# Patient Record
Sex: FEMALE | Race: WHITE | NOT HISPANIC OR LATINO | Employment: FULL TIME | URBAN - METROPOLITAN AREA
[De-identification: names, ages, dates, MRNs, and addresses within clinical notes are randomized per-mention and may not be internally consistent; named-entity substitution may affect disease eponyms.]

---

## 2018-07-12 DIAGNOSIS — Z12.39 SCREENING BREAST EXAMINATION: Primary | ICD-10-CM

## 2018-08-14 ENCOUNTER — HOSPITAL ENCOUNTER (OUTPATIENT)
Dept: CARDIOLOGY | Facility: HOSPITAL | Age: 43
Discharge: HOME | End: 2018-08-14
Attending: OBSTETRICS & GYNECOLOGY
Payer: COMMERCIAL

## 2018-08-14 ENCOUNTER — HOSPITAL ENCOUNTER (OUTPATIENT)
Dept: RADIOLOGY | Facility: HOSPITAL | Age: 43
Discharge: HOME | End: 2018-08-14
Attending: OBSTETRICS & GYNECOLOGY
Payer: COMMERCIAL

## 2018-08-14 DIAGNOSIS — N92.0 MENORRHAGIA: ICD-10-CM

## 2018-08-14 DIAGNOSIS — N92.0 MENORRHAGIA: Primary | ICD-10-CM

## 2018-08-14 DIAGNOSIS — N94.6 MENORRHALGIA: ICD-10-CM

## 2018-08-14 DIAGNOSIS — N84.0 ENDOMETRIAL POLYP: ICD-10-CM

## 2018-08-14 PROCEDURE — 71046 X-RAY EXAM CHEST 2 VIEWS: CPT | Mod: 26,,, | Performed by: RADIOLOGY

## 2018-08-14 PROCEDURE — 71046 X-RAY EXAM CHEST 2 VIEWS: CPT | Mod: TC,FY

## 2018-08-14 PROCEDURE — 93005 ELECTROCARDIOGRAM TRACING: CPT

## 2018-08-14 RX ORDER — ACETAMINOPHEN 10 MG/ML
1000 INJECTION, SOLUTION INTRAVENOUS
Status: CANCELLED | OUTPATIENT
Start: 2018-08-14 | End: 2018-08-14

## 2018-08-14 RX ORDER — DOXYCYCLINE HYCLATE 100 MG
100 TABLET ORAL
Status: CANCELLED | OUTPATIENT
Start: 2018-08-14

## 2018-08-14 RX ORDER — DOXYCYCLINE HYCLATE 100 MG
100 TABLET ORAL 2 TIMES DAILY
Status: CANCELLED | OUTPATIENT
Start: 2018-08-14 | End: 2018-08-15

## 2018-08-14 RX ORDER — SODIUM CHLORIDE, SODIUM LACTATE, POTASSIUM CHLORIDE, CALCIUM CHLORIDE 600; 310; 30; 20 MG/100ML; MG/100ML; MG/100ML; MG/100ML
INJECTION, SOLUTION INTRAVENOUS CONTINUOUS
Status: CANCELLED | OUTPATIENT
Start: 2018-08-14

## 2018-08-15 ENCOUNTER — HOSPITAL ENCOUNTER (OUTPATIENT)
Facility: HOSPITAL | Age: 43
Discharge: HOME | End: 2018-08-15
Attending: OBSTETRICS & GYNECOLOGY | Admitting: OBSTETRICS & GYNECOLOGY
Payer: COMMERCIAL

## 2018-08-15 ENCOUNTER — ANESTHESIA EVENT (OUTPATIENT)
Dept: SURGERY | Facility: HOSPITAL | Age: 43
End: 2018-08-15
Payer: COMMERCIAL

## 2018-08-15 ENCOUNTER — ANESTHESIA (OUTPATIENT)
Dept: SURGERY | Facility: HOSPITAL | Age: 43
End: 2018-08-15
Payer: COMMERCIAL

## 2018-08-15 DIAGNOSIS — N94.6 MENORRHALGIA: ICD-10-CM

## 2018-08-15 DIAGNOSIS — Z01.818 PREOPERATIVE EVALUATION OF A MEDICAL CONDITION TO RULE OUT SURGICAL CONTRAINDICATIONS (TAR REQUIRED): ICD-10-CM

## 2018-08-15 DIAGNOSIS — N84.0 ENDOMETRIAL POLYP: ICD-10-CM

## 2018-08-15 DIAGNOSIS — D50.0 IRON DEFICIENCY ANEMIA DUE TO CHRONIC BLOOD LOSS: Primary | ICD-10-CM

## 2018-08-15 LAB
B-HCG UR QL: NEGATIVE
BILIRUB UR QL STRIP: NEGATIVE
CLARITY UR: CLEAR
COLOR UR: YELLOW
GLUCOSE UR QL STRIP: NEGATIVE
HGB UR QL STRIP: NEGATIVE
KETONES UR QL STRIP: NEGATIVE
LEUKOCYTE ESTERASE UR QL STRIP: NEGATIVE
NITRITE UR QL STRIP: NEGATIVE
PH UR STRIP: >8 [PH] (ref 5–8)
PROT UR QL STRIP: NEGATIVE
SP GR UR STRIP: 1.02 (ref 1–1.03)
URN SPEC COLLECT METH UR: ABNORMAL
UROBILINOGEN UR STRIP-ACNC: NEGATIVE EU/DL

## 2018-08-15 PROCEDURE — S0020 INJECTION, BUPIVICAINE HYDRO: HCPCS | Performed by: OBSTETRICS & GYNECOLOGY

## 2018-08-15 PROCEDURE — 88305 TISSUE EXAM BY PATHOLOGIST: CPT | Performed by: PATHOLOGY

## 2018-08-15 PROCEDURE — 36000706: Performed by: OBSTETRICS & GYNECOLOGY

## 2018-08-15 PROCEDURE — 25000003 PHARM REV CODE 250: Performed by: OBSTETRICS & GYNECOLOGY

## 2018-08-15 PROCEDURE — 36000707: Performed by: OBSTETRICS & GYNECOLOGY

## 2018-08-15 PROCEDURE — 27201423 OPTIME MED/SURG SUP & DEVICES STERILE SUPPLY: Performed by: OBSTETRICS & GYNECOLOGY

## 2018-08-15 PROCEDURE — 63600175 PHARM REV CODE 636 W HCPCS: Performed by: NURSE ANESTHETIST, CERTIFIED REGISTERED

## 2018-08-15 PROCEDURE — 37000008 HC ANESTHESIA 1ST 15 MINUTES: Performed by: OBSTETRICS & GYNECOLOGY

## 2018-08-15 PROCEDURE — 63600175 PHARM REV CODE 636 W HCPCS: Performed by: OBSTETRICS & GYNECOLOGY

## 2018-08-15 PROCEDURE — 37000009 HC ANESTHESIA EA ADD 15 MINS: Performed by: OBSTETRICS & GYNECOLOGY

## 2018-08-15 PROCEDURE — 81025 URINE PREGNANCY TEST: CPT

## 2018-08-15 PROCEDURE — 25000003 PHARM REV CODE 250: Performed by: ANESTHESIOLOGY

## 2018-08-15 PROCEDURE — 71000033 HC RECOVERY, INTIAL HOUR: Performed by: OBSTETRICS & GYNECOLOGY

## 2018-08-15 PROCEDURE — D9220A PRA ANESTHESIA: Mod: ,,, | Performed by: ANESTHESIOLOGY

## 2018-08-15 PROCEDURE — 88305 TISSUE EXAM BY PATHOLOGIST: CPT | Mod: 26,,, | Performed by: PATHOLOGY

## 2018-08-15 PROCEDURE — 81003 URINALYSIS AUTO W/O SCOPE: CPT

## 2018-08-15 PROCEDURE — 71000015 HC POSTOP RECOV 1ST HR: Performed by: OBSTETRICS & GYNECOLOGY

## 2018-08-15 PROCEDURE — S0028 INJECTION, FAMOTIDINE, 20 MG: HCPCS | Performed by: ANESTHESIOLOGY

## 2018-08-15 RX ORDER — SODIUM CHLORIDE, SODIUM LACTATE, POTASSIUM CHLORIDE, CALCIUM CHLORIDE 600; 310; 30; 20 MG/100ML; MG/100ML; MG/100ML; MG/100ML
INJECTION, SOLUTION INTRAVENOUS CONTINUOUS
Status: DISCONTINUED | OUTPATIENT
Start: 2018-08-15 | End: 2018-08-16 | Stop reason: HOSPADM

## 2018-08-15 RX ORDER — AMOXICILLIN 250 MG
1 CAPSULE ORAL 2 TIMES DAILY
COMMUNITY
Start: 2018-08-15

## 2018-08-15 RX ORDER — ONDANSETRON 2 MG/ML
4 INJECTION INTRAMUSCULAR; INTRAVENOUS DAILY PRN
Status: DISCONTINUED | OUTPATIENT
Start: 2018-08-15 | End: 2018-08-16 | Stop reason: HOSPADM

## 2018-08-15 RX ORDER — PROPOFOL 10 MG/ML
VIAL (ML) INTRAVENOUS
Status: DISCONTINUED | OUTPATIENT
Start: 2018-08-15 | End: 2018-08-15

## 2018-08-15 RX ORDER — FAMOTIDINE 20 MG/50ML
20 INJECTION, SOLUTION INTRAVENOUS
Status: ACTIVE | OUTPATIENT
Start: 2018-08-15 | End: 2018-08-16

## 2018-08-15 RX ORDER — MIDAZOLAM HYDROCHLORIDE 1 MG/ML
INJECTION, SOLUTION INTRAMUSCULAR; INTRAVENOUS
Status: DISCONTINUED | OUTPATIENT
Start: 2018-08-15 | End: 2018-08-15

## 2018-08-15 RX ORDER — IBUPROFEN 600 MG/1
600 TABLET ORAL EVERY 6 HOURS PRN
Status: DISCONTINUED | OUTPATIENT
Start: 2018-08-15 | End: 2018-08-16 | Stop reason: HOSPADM

## 2018-08-15 RX ORDER — MORPHINE SULFATE 4 MG/ML
2 INJECTION, SOLUTION INTRAMUSCULAR; INTRAVENOUS EVERY 5 MIN PRN
Status: DISCONTINUED | OUTPATIENT
Start: 2018-08-15 | End: 2018-08-16 | Stop reason: HOSPADM

## 2018-08-15 RX ORDER — BUPIVACAINE HYDROCHLORIDE 5 MG/ML
INJECTION, SOLUTION EPIDURAL; INTRACAUDAL
Status: DISCONTINUED | OUTPATIENT
Start: 2018-08-15 | End: 2018-08-15 | Stop reason: HOSPADM

## 2018-08-15 RX ORDER — MORPHINE SULFATE 4 MG/ML
3 INJECTION, SOLUTION INTRAMUSCULAR; INTRAVENOUS
Status: DISCONTINUED | OUTPATIENT
Start: 2018-08-15 | End: 2018-08-16 | Stop reason: HOSPADM

## 2018-08-15 RX ORDER — DOXYCYCLINE 100 MG/1
100 CAPSULE ORAL EVERY 12 HOURS
Qty: 14 CAPSULE | Refills: 1 | Status: SHIPPED | OUTPATIENT
Start: 2018-08-15 | End: 2018-08-22

## 2018-08-15 RX ORDER — DOXYCYCLINE HYCLATE 100 MG
100 TABLET ORAL
Status: DISCONTINUED | OUTPATIENT
Start: 2018-08-15 | End: 2018-08-16 | Stop reason: HOSPADM

## 2018-08-15 RX ORDER — SODIUM CHLORIDE, SODIUM LACTATE, POTASSIUM CHLORIDE, CALCIUM CHLORIDE 600; 310; 30; 20 MG/100ML; MG/100ML; MG/100ML; MG/100ML
125 INJECTION, SOLUTION INTRAVENOUS CONTINUOUS
Status: DISCONTINUED | OUTPATIENT
Start: 2018-08-15 | End: 2018-08-16 | Stop reason: HOSPADM

## 2018-08-15 RX ORDER — HYDROMORPHONE HYDROCHLORIDE 2 MG/ML
1 INJECTION, SOLUTION INTRAMUSCULAR; INTRAVENOUS; SUBCUTANEOUS EVERY 4 HOURS PRN
Status: DISCONTINUED | OUTPATIENT
Start: 2018-08-15 | End: 2018-08-16 | Stop reason: HOSPADM

## 2018-08-15 RX ORDER — DIPHENHYDRAMINE HCL 25 MG
25 CAPSULE ORAL EVERY 4 HOURS PRN
Status: DISCONTINUED | OUTPATIENT
Start: 2018-08-15 | End: 2018-08-16 | Stop reason: HOSPADM

## 2018-08-15 RX ORDER — ONDANSETRON 4 MG/1
8 TABLET, ORALLY DISINTEGRATING ORAL EVERY 8 HOURS PRN
Status: DISCONTINUED | OUTPATIENT
Start: 2018-08-15 | End: 2018-08-16 | Stop reason: HOSPADM

## 2018-08-15 RX ORDER — OXYCODONE AND ACETAMINOPHEN 10; 325 MG/1; MG/1
1 TABLET ORAL EVERY 8 HOURS PRN
Qty: 14 TABLET | Refills: 0 | Status: SHIPPED | OUTPATIENT
Start: 2018-08-15 | End: 2022-09-20

## 2018-08-15 RX ORDER — DOXYCYCLINE HYCLATE 100 MG
200 TABLET ORAL ONCE
Status: DISCONTINUED | OUTPATIENT
Start: 2018-08-15 | End: 2018-08-16 | Stop reason: HOSPADM

## 2018-08-15 RX ORDER — DOXYCYCLINE HYCLATE 100 MG
100 TABLET ORAL 2 TIMES DAILY
Status: DISPENSED | OUTPATIENT
Start: 2018-08-15 | End: 2018-08-16

## 2018-08-15 RX ORDER — LIDOCAINE HYDROCHLORIDE 10 MG/ML
1 INJECTION, SOLUTION EPIDURAL; INFILTRATION; INTRACAUDAL; PERINEURAL ONCE
Status: DISCONTINUED | OUTPATIENT
Start: 2018-08-15 | End: 2018-08-16 | Stop reason: HOSPADM

## 2018-08-15 RX ORDER — KETOROLAC TROMETHAMINE 30 MG/ML
30 INJECTION, SOLUTION INTRAMUSCULAR; INTRAVENOUS ONCE
Status: CANCELLED | OUTPATIENT
Start: 2018-08-15 | End: 2018-08-18

## 2018-08-15 RX ORDER — MEPERIDINE HYDROCHLORIDE 50 MG/ML
INJECTION INTRAMUSCULAR; INTRAVENOUS; SUBCUTANEOUS
Status: DISCONTINUED | OUTPATIENT
Start: 2018-08-15 | End: 2018-08-15

## 2018-08-15 RX ORDER — IBUPROFEN 800 MG/1
800 TABLET ORAL EVERY 8 HOURS PRN
Qty: 30 TABLET | Refills: 2 | Status: SHIPPED | OUTPATIENT
Start: 2018-08-15

## 2018-08-15 RX ORDER — DIPHENHYDRAMINE HYDROCHLORIDE 50 MG/ML
12.5 INJECTION INTRAMUSCULAR; INTRAVENOUS
Status: DISCONTINUED | OUTPATIENT
Start: 2018-08-15 | End: 2018-08-16 | Stop reason: HOSPADM

## 2018-08-15 RX ORDER — DIPHENHYDRAMINE HYDROCHLORIDE 50 MG/ML
25 INJECTION INTRAMUSCULAR; INTRAVENOUS EVERY 4 HOURS PRN
Status: DISCONTINUED | OUTPATIENT
Start: 2018-08-15 | End: 2018-08-16 | Stop reason: HOSPADM

## 2018-08-15 RX ORDER — LIDOCAINE HYDROCHLORIDE 10 MG/ML
INJECTION INFILTRATION; PERINEURAL
Status: DISCONTINUED | OUTPATIENT
Start: 2018-08-15 | End: 2018-08-15 | Stop reason: HOSPADM

## 2018-08-15 RX ORDER — ACETAMINOPHEN 10 MG/ML
1000 INJECTION, SOLUTION INTRAVENOUS
Status: COMPLETED | OUTPATIENT
Start: 2018-08-15 | End: 2018-08-15

## 2018-08-15 RX ORDER — MORPHINE SULFATE 4 MG/ML
2 INJECTION, SOLUTION INTRAMUSCULAR; INTRAVENOUS
Status: DISCONTINUED | OUTPATIENT
Start: 2018-08-15 | End: 2018-08-16 | Stop reason: HOSPADM

## 2018-08-15 RX ORDER — FAMOTIDINE 10 MG/ML
20 INJECTION INTRAVENOUS ONCE
Status: COMPLETED | OUTPATIENT
Start: 2018-08-15 | End: 2018-08-15

## 2018-08-15 RX ADMIN — SODIUM CHLORIDE, POTASSIUM CHLORIDE, SODIUM LACTATE AND CALCIUM CHLORIDE: 600; 310; 30; 20 INJECTION, SOLUTION INTRAVENOUS at 01:08

## 2018-08-15 RX ADMIN — PROPOFOL 30 MG: 10 INJECTION, EMULSION INTRAVENOUS at 01:08

## 2018-08-15 RX ADMIN — PROPOFOL 20 MG: 10 INJECTION, EMULSION INTRAVENOUS at 01:08

## 2018-08-15 RX ADMIN — ACETAMINOPHEN 1000 MG: 10 INJECTION, SOLUTION INTRAVENOUS at 01:08

## 2018-08-15 RX ADMIN — PROPOFOL 50 MG: 10 INJECTION, EMULSION INTRAVENOUS at 01:08

## 2018-08-15 RX ADMIN — DOXYCYCLINE HYCLATE 100 MG: 100 TABLET, COATED ORAL at 01:08

## 2018-08-15 RX ADMIN — MEPERIDINE HYDROCHLORIDE 50 MG: 50 INJECTION INTRAMUSCULAR; INTRAVENOUS; SUBCUTANEOUS at 01:08

## 2018-08-15 RX ADMIN — MIDAZOLAM HYDROCHLORIDE 2 MG: 1 INJECTION, SOLUTION INTRAMUSCULAR; INTRAVENOUS at 01:08

## 2018-08-15 RX ADMIN — FAMOTIDINE 20 MG: 10 INJECTION, SOLUTION INTRAVENOUS at 01:08

## 2018-08-15 NOTE — OP NOTE
[facility] - Periop Services  Operative Note     SUMMARY     Surgery Date: 8/15/2018     Procedure Performed By: Elder Salazar MD    Procedure Performed: D & C, Hysteroscopy & Novasure    Anesthesia:  Mac and paracervical block with 1% lidocaine  Assisted By:  None    Pre-op Diagnosis:  Menorrhagia with irregular cycle [N92.1]  Endometrial polyp [N84.0]    Post-op Diagnosis:  Post-Op Diagnosis Codes:     * Menorrhagia with irregular cycle [N92.1]     * Endometrial polyp [N84.0]     Estimated Blood Loss: *10cc  Complications:  No complications  Specimen:  Specimens (From admission, onward)    None        Findings:  Sounded to 11 cm with a cavity length of 5.5 cm and a cervical length of 5.5 cm cavity width of 4.6 cm a power 139 and a burn time of 1 min 53 sec.  20 cc fluid deficit.  No evidence of endometrial polyp or submucosal fibroid.  There was a fluffy endometrium noted.      Procedure in Detail: After ensuring informed consent, the patient was taken to the operating room where general anesthesia was initiated. A time out was performed with the O.R. crew. She was placed in the adjustable Harlan stirrups. Her abdomen and perineum were prepped and draped in the usual sterile fashion. Her bladder was drained of its urine. The anterior lip of the cervix was grasped with a single- toothed tenaculum. Next the patient was sounded to the above sounding length. Net the cervix was gently dilated with the Hanks dilators. Next, the hysteroscope was placed into the uterine cavity. Both ostia were viewed. Upon removing the hysteroscope, it was determined that the cervical length was as above. The hysteroscope was removed. A sharp curettage was then performed. Next, the Novasure device was placed. The instrument was primed, and the ablation proceeded for the above stated time without any complications.  The Novasure device was removed and a repeat hysteroscopy was performed, which revealed an adequate  burn with no evidence of any perforations. At this point, the case was concluded. All instruments were removed from the patients vagina. She was taken out of the adjustable Harlan stirrups and placed in the supine position. She was awakened from anesthesia and taken to the recovery room in stable condition.  All counts were correct x 2. The patient tolerated the procedure well.

## 2018-08-15 NOTE — ANESTHESIA POSTPROCEDURE EVALUATION
"Anesthesia Post Evaluation    Patient: Catalina Abrams    Procedure(s) Performed: Procedure(s) (LRB):  HYSTEROSCOPY, WITH DILATION AND CURETTAGE OF UTERUS (N/A)  ABLATION, ENDOMETRIUM, THERMAL (N/A)    Final Anesthesia Type: MAC  Patient location during evaluation: PACU  Patient participation: Yes- Able to Participate  Level of consciousness: awake and alert  Post-procedure vital signs: reviewed and stable  Pain management: adequate  Airway patency: patent  PONV status at discharge: No PONV  Anesthetic complications: no      Cardiovascular status: blood pressure returned to baseline  Respiratory status: unassisted  Hydration status: euvolemic  Follow-up not needed.        Visit Vitals  Ht 5' 4" (1.626 m)   Wt 72.6 kg (160 lb)   LMP 08/03/2018   Breastfeeding? No   BMI 27.46 kg/m²       Pain/Emil Score: Pain Assessment Performed: Yes (8/15/2018 12:36 PM)  Presence of Pain: denies (8/15/2018 12:36 PM)  Pain Rating Prior to Med Admin: 2 (8/15/2018  1:12 PM)        "

## 2018-08-15 NOTE — TRANSFER OF CARE
"Anesthesia Transfer of Care Note    Patient: Catalina Abrams    Procedure(s) Performed: Procedure(s) (LRB):  HYSTEROSCOPY, WITH DILATION AND CURETTAGE OF UTERUS (N/A)  ABLATION, ENDOMETRIUM, THERMAL (N/A)  ABLATION, ENDOMETRIUM, USING RESECTOSCOPE (N/A)    Patient location: PACU    Anesthesia Type: general    Transport from OR: Transported from OR on room air with adequate spontaneous ventilation    Post pain: adequate analgesia    Post assessment: no apparent anesthetic complications and tolerated procedure well    Post vital signs: stable    Level of consciousness: awake, alert and oriented    Nausea/Vomiting: no nausea/vomiting    Complications: none    Transfer of care protocol was followed      Last vitals:   Visit Vitals  Ht 5' 4" (1.626 m)   Wt 72.6 kg (160 lb)   LMP 08/03/2018   Breastfeeding? No   BMI 27.46 kg/m²     "

## 2018-08-15 NOTE — OR NURSING
Leaving floor per w/c with RN and laly GALVEZ. Resp even and unlabored on room air. No distress noted. Denies c/o pain or nausea. Tolerating PO fluids without c/o nausea or abdominal pain. All belongings returned to pt.

## 2018-08-15 NOTE — ANESTHESIA PREPROCEDURE EVALUATION
08/15/2018  Catalina Abrams is a 42 y.o., female.    Anesthesia Evaluation    I have reviewed the Patient Summary Reports.    I have reviewed the Nursing Notes.   I have reviewed the Medications.     Review of Systems  Social:  Smoker        Physical Exam  General:  Well nourished    Airway/Jaw/Neck:  Airway Findings: Mallampati: II TM Distance: 4 - 6 cm  Jaw/Neck Findings:  Neck ROM: Normal ROM       Chest/Lungs:  Chest/Lungs Findings: Clear to auscultation     Heart/Vascular:  Heart Findings: Rate: Normal  Rhythm: Regular Rhythm        Mental Status:  Mental Status Findings:  Cooperative, Alert and Oriented         Anesthesia Plan  Type of Anesthesia, risks & benefits discussed:  Anesthesia Type:  general, MAC  Patient's Preference:   Intra-op Monitoring Plan: standard ASA monitors  Intra-op Monitoring Plan Comments:   Post Op Pain Control Plan: IV/PO Opioids PRN  Post Op Pain Control Plan Comments:   Induction:    Beta Blocker:  Patient is not currently on a Beta-Blocker (No further documentation required).       Informed Consent: Patient understands risks and agrees with Anesthesia plan.  Questions answered. Anesthesia consent signed with patient.  ASA Score: 2     Day of Surgery Review of History & Physical:            Ready For Surgery From Anesthesia Perspective.

## 2018-08-15 NOTE — BRIEF OP NOTE
[facility] - Periop Services  Brief Operative Note     SUMMARY     Surgery Date: 8/15/2018     Surgeon(s) and Role:     * Elder Salazar MD - Primary    Assisting Surgeon: None    Pre-op Diagnosis:  Menorrhagia with irregular cycle [N92.1]  Endometrial polyp [N84.0]    Post-op Diagnosis:  Post-Op Diagnosis Codes:     * Menorrhagia with irregular cycle [N92.1]     * Endometrial polyp [N84.0]    Procedure(s) (LRB):  HYSTEROSCOPY, WITH DILATION AND CURETTAGE OF UTERUS (N/A)  ABLATION, ENDOMETRIUM, THERMAL (N/A)  ABLATION, ENDOMETRIUM, USING RESECTOSCOPE (N/A)    Anesthesia: General    Description of the findings of the procedure:  Patient sounded to 11 cm with a cervical length of 5.5 cm and a cavity length of 5.5 cm a cavity with a 4.6 cm a power 139 w and a burn time of 1 min 53 sec.  There was no evidence of a submucosal fibroid and no evidence of endometrial polyp.  She had a fluffy endometrium both ostia were viewed.  Findings/Key Components: na      Estimated Blood Loss: * No values recorded between 8/15/2018 12:00 AM and 8/15/2018  2:00 PM *         Specimens:   Specimen (12h ago, onward)    None          Discharge Note    SUMMARY     Admit Date: 8/15/2018    Discharge Date and Time:  08/15/2018 2:01 PM    Hospital Course (synopsis of major diagnoses, care, treatment, and services provided during the course of the hospital stay):  Patient had no complication     Final Diagnosis: Post-Op Diagnosis Codes:     * Menorrhagia with irregular cycle [N92.1]     * Endometrial polyp [N84.0]    Disposition: Home or Self Care    Follow Up/Patient Instructions:     Medications:  Reconciled Home Medications:      Medication List      START taking these medications    doxycycline 100 MG capsule  Commonly known as:  MONODOX  Take 1 capsule (100 mg total) by mouth every 12 (twelve) hours. MAY REFILL ONCE for 7 days     ibuprofen 800 MG tablet  Commonly known as:  ADVIL,MOTRIN  Take 1 tablet (800 mg  total) by mouth every 8 (eight) hours as needed for Pain.     oxyCODONE-acetaminophen  mg per tablet  Commonly known as:  PERCOCET  Take 1 tablet by mouth every 8 (eight) hours as needed for Pain.     senna-docusate 8.6-50 mg 8.6-50 mg per tablet  Commonly known as:  PERICOLACE  Take 1 tablet by mouth 2 (two) times daily.          Discharge Procedure Orders   Diet general     Other restrictions (specify):   Order Comments: Do not have sex, use tampons, or douche     Call MD for:  temperature >100.4     Call MD for:  persistent nausea and vomiting     Call MD for:  severe uncontrolled pain     Call MD for:  difficulty breathing, headache or visual disturbances     Call MD for:  redness, tenderness, or signs of infection (pain, swelling, redness, odor or green/yellow discharge around incision site)     Type And Screen Preop   Standing Status: Future Standing Exp. Date: 10/13/19     Follow-up Information     Elder Salazar MD. Schedule an appointment as soon as possible for a visit in 2 weeks.    Specialty:  Obstetrics  Contact information:  [address] [facility] [address] [phone]

## 2018-08-16 VITALS
OXYGEN SATURATION: 99 % | BODY MASS INDEX: 27.31 KG/M2 | DIASTOLIC BLOOD PRESSURE: 72 MMHG | RESPIRATION RATE: 16 BRPM | HEART RATE: 62 BPM | TEMPERATURE: 98 F | HEIGHT: 64 IN | SYSTOLIC BLOOD PRESSURE: 115 MMHG | WEIGHT: 160 LBS

## 2022-09-20 ENCOUNTER — HOSPITAL ENCOUNTER (EMERGENCY)
Facility: HOSPITAL | Age: 47
Discharge: HOME | End: 2022-09-20
Payer: COMMERCIAL

## 2022-09-20 VITALS
BODY MASS INDEX: 28.17 KG/M2 | SYSTOLIC BLOOD PRESSURE: 130 MMHG | OXYGEN SATURATION: 98 % | TEMPERATURE: 99 F | DIASTOLIC BLOOD PRESSURE: 80 MMHG | RESPIRATION RATE: 18 BRPM | HEIGHT: 64 IN | HEART RATE: 80 BPM | WEIGHT: 165 LBS

## 2022-09-20 DIAGNOSIS — Z79.1 LONG TERM CURRENT USE OF NON-STEROIDAL ANTI-INFLAMMATORIES (NSAID): ICD-10-CM

## 2022-09-20 DIAGNOSIS — M62.838 MUSCLE SPASM OF LEFT SHOULDER: ICD-10-CM

## 2022-09-20 DIAGNOSIS — M25.512 ACUTE PAIN OF LEFT SHOULDER: Primary | ICD-10-CM

## 2022-09-20 PROCEDURE — 96372 THER/PROPH/DIAG INJ SC/IM: CPT | Performed by: NURSE PRACTITIONER

## 2022-09-20 PROCEDURE — 63600175 PHARM REV CODE 636 W HCPCS: Performed by: NURSE PRACTITIONER

## 2022-09-20 PROCEDURE — 99284 EMERGENCY DEPT VISIT MOD MDM: CPT

## 2022-09-20 PROCEDURE — 73030 X-RAY EXAM OF SHOULDER: CPT | Mod: 26,LT,, | Performed by: RADIOLOGY

## 2022-09-20 PROCEDURE — 73030 X-RAY EXAM OF SHOULDER: CPT | Mod: TC,LT

## 2022-09-20 PROCEDURE — 73030 XR SHOULDER COMPLETE 2 OR MORE VIEWS LEFT: ICD-10-PCS | Mod: 26,LT,, | Performed by: RADIOLOGY

## 2022-09-20 RX ORDER — KETOROLAC TROMETHAMINE 30 MG/ML
30 INJECTION, SOLUTION INTRAMUSCULAR; INTRAVENOUS
Status: COMPLETED | OUTPATIENT
Start: 2022-09-20 | End: 2022-09-20

## 2022-09-20 RX ORDER — KETOROLAC TROMETHAMINE 30 MG/ML
30 INJECTION, SOLUTION INTRAMUSCULAR; INTRAVENOUS
Status: DISCONTINUED | OUTPATIENT
Start: 2022-09-20 | End: 2022-09-20

## 2022-09-20 RX ORDER — CYCLOBENZAPRINE HCL 10 MG
10 TABLET ORAL 3 TIMES DAILY PRN
Qty: 20 TABLET | Refills: 0 | Status: SHIPPED | OUTPATIENT
Start: 2022-09-20 | End: 2022-09-25

## 2022-09-20 RX ORDER — OMEPRAZOLE 20 MG/1
20 CAPSULE, DELAYED RELEASE ORAL DAILY
Qty: 30 CAPSULE | Refills: 0 | Status: SHIPPED | OUTPATIENT
Start: 2022-09-20 | End: 2023-09-20

## 2022-09-20 RX ADMIN — KETOROLAC TROMETHAMINE 30 MG: 30 INJECTION, SOLUTION INTRAMUSCULAR; INTRAVENOUS at 03:09

## 2022-09-20 NOTE — Clinical Note
"Catalina"Josselyn Abrams was seen and treated in our emergency department on 9/20/2022.  She may return to work on 09/21/2022.       If you have any questions or concerns, please don't hesitate to call.      Gilberto Dunn NP"

## 2022-09-20 NOTE — DISCHARGE INSTRUCTIONS
Continue take medication as prescribed.  Take Prilosec daily to prevent GI upset secondary to long-term NSAID use.  Use Flexeril as needed for muscle spasms.  Do not operate heavy machinery or drive while taking this medication.  Follow-up primary care provider if you continue to have symptoms long 3-5 days and for refills.  Follow-up with orthopedics if continued have pain lasting longer than 3-5 days.  May return emergency room for symptoms continue more than 3-5 days or new worsening symptoms develop.

## 2022-09-20 NOTE — ED PROVIDER NOTES
Encounter Date: 2022       History     Chief Complaint   Patient presents with    Left shoulder pain      Patient reports Left shoulder pain feels like something is pinched x 1 week.      Patient is a 46 year female presents emergency room with left anterior shoulder pain x1 week.  Patient states pain is worse with any movement forward upper left arm.  Patient denies any chest pain, shortness of breath, nausea, vomiting, diarrhea, fever, chills.  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  Patient states she was off Monday and was okay.  Patient states she takes 800 mg ibuprofen daily which is prescribed by her dermatologist.  Patient states she was up at 3:00 a.m. this morning with pain, took 1 dose of ibuprofen, states she awoke in 2nd time as 6:00 a.m. and vomited times once and noticed that the ibuprofen was still in 1 piece.    Review of patient's allergies indicates:  No Known Allergies  Past Medical History:   Diagnosis Date    Known health problems: none      Past Surgical History:   Procedure Laterality Date    APPENDECTOMY       SECTION      HYSTEROSCOPY WITH DILATION AND CURETTAGE OF UTERUS N/A 8/15/2018    Procedure: HYSTEROSCOPY, WITH DILATION AND CURETTAGE OF UTERUS;  Surgeon: Elder Salazar MD;  Location: [facility] OR;  Service: OB/GYN;  Laterality: N/A;    THERMAL ABLATION OF ENDOMETRIUM N/A 8/15/2018    Procedure: ABLATION, ENDOMETRIUM, THERMAL;  Surgeon: Elder Salazar MD;  Location: [facility] OR;  Service: OB/GYN;  Laterality: N/A;    TUBAL LIGATION       Family History   Problem Relation Age of Onset    Hypertension Mother     COPD Mother     Diabetes Mother     Hypertension Father      Social History     Tobacco Use    Smoking status: Every Day     Packs/day: 0.25     Years: 30.00     Pack years: 7.50     Types: Cigarettes    Smokeless tobacco: Never   Substance Use Topics     Alcohol use: No    Drug use: No     Review of Systems   Constitutional: Negative.    HENT: Negative.     Eyes: Negative.    Respiratory: Negative.     Cardiovascular: Negative.    Gastrointestinal: Negative.    Endocrine: Negative.    Genitourinary: Negative.    Musculoskeletal:  Positive for arthralgias (Left shoulder). Negative for gait problem and joint swelling.   Skin: Negative.    Allergic/Immunologic: Negative for food allergies.   Neurological: Negative.    Hematological: Negative.    Psychiatric/Behavioral: Negative.     All other systems reviewed and are negative.    Physical Exam     Initial Vitals [09/20/22 1403]   BP Pulse Resp Temp SpO2   130/80 80 18 98.8 °F (37.1 °C) 98 %      MAP       --         Physical Exam    Nursing note and vitals reviewed.  Constitutional: She appears well-developed and well-nourished. She is not diaphoretic. No distress.   HENT:   Head: Normocephalic and atraumatic.   Mouth/Throat: Oropharynx is clear and moist.   Eyes: EOM are normal. Pupils are equal, round, and reactive to light. No scleral icterus.   Neck:   Normal range of motion.  Cardiovascular:  Normal rate, regular rhythm and normal heart sounds.           Pulmonary/Chest: Breath sounds normal.   Musculoskeletal:         General: No edema.      Right shoulder: Normal.      Left shoulder: No swelling, deformity, effusion, laceration, tenderness, bony tenderness or crepitus. Decreased range of motion (Anterior on raise is slightly decreased.  Worsening pain noted.). Normal strength. Normal pulse.      Left upper arm: Normal.      Left elbow: Normal.      Cervical back: Normal range of motion.      Comments: Patient does experience pain with anterior left arm raise, patient did have a muscle spasm noted on exam.  There is no pain with internal external rotation lateral or posterior raise to left shoulder.  Patient most likely experiencing muscle spasms to anterior deltoid.  Or possibly could be muscle strain or tear.      Neurological: She is alert and oriented to person, place, and time. She has normal strength and normal reflexes. GCS score is 15. GCS eye subscore is 4. GCS verbal subscore is 5. GCS motor subscore is 6.   Skin: Skin is warm and dry. Capillary refill takes 2 to 3 seconds.   Psychiatric: She has a normal mood and affect.       ED Course   Procedures  Labs Reviewed - No data to display       Imaging Results              X-Ray Shoulder 2 or More Views Left (Final result)  Result time 09/20/22 15:15:57      Final result by Bakari Parra MD (09/20/22 15:15:57)                   Impression:      No acute radiographic findings of the left shoulder.      Electronically signed by: Bakari Parra  Date:    09/20/2022  Time:    15:15               Narrative:    EXAMINATION:  XR SHOULDER COMPLETE 2 OR MORE VIEWS LEFT    CLINICAL HISTORY:  pain;    TECHNIQUE:  Two or three views of the left shoulder were performed.    COMPARISON:  None    FINDINGS:  No acute fracture or dislocation.  No significant soft tissue swelling.    Humeral head normally positioned with the glenoid cavity.  Glenohumeral joint space preserved.   AC joint is intact.    Visualized left lung is clear.                                    X-Rays:   Independently Interpreted Readings:   Other Readings:  Left shoulder x-ray    FINDINGS:  No acute fracture or dislocation.  No significant soft tissue swelling.     Humeral head normally positioned with the glenoid cavity.  Glenohumeral joint space preserved.   AC joint is intact.     Visualized left lung is clear.     Impression:     No acute radiographic findings of the left shoulder.     Medications   ketorolac injection 30 mg (30 mg Intramuscular Given 9/20/22 1500)     Medical Decision Making:   Initial Assessment:   Patient seen in the emergency room.  Assessment as noted above.  Patient appears in no distress acute distress.  Differential Diagnosis:   Muscle spasm, muscle strain, rotator cuff tear,  fracture dislocation  Clinical Tests:   Radiological Study: Ordered and Reviewed  ED Management:  Patient seen examined emergency room.  X-ray reviewed, no abnormalities noted.  Patient is possibly having muscle spasms or a left anterior deltoid strain or tear.  Patient is already taking 800 mg ibuprofen twice a day, states she has plenty to take 3 times a day.  Patient not currently on a PPI.  Will prescribe patient PPI, to follow-up primary care provider for refills.  Will also give the patient Flexeril to help with muscle spasms of left shoulder.  Patient has been patient sling it, should wear sling next 3-5 days to see if symptoms improve.  If symptoms do not improve she will need to follow-up with orthopedics.                        Clinical Impression:   Final diagnoses:  [M25.512] Acute pain of left shoulder (Primary)  [M62.838] Muscle spasm of left shoulder  [Z79.1] Long term current use of non-steroidal anti-inflammatories (NSAID)      ED Disposition Condition    Discharge Stable          ED Prescriptions       Medication Sig Dispense Start Date End Date Auth. Provider    cyclobenzaprine (FLEXERIL) 10 MG tablet Take 1 tablet (10 mg total) by mouth 3 (three) times daily as needed for Muscle spasms. 20 tablet 9/20/2022 9/25/2022 Gilberto Dunn NP    omeprazole (PRILOSEC) 20 MG capsule Take 1 capsule (20 mg total) by mouth once daily. 30 capsule 9/20/2022 9/20/2023 Gilberto Dunn NP          Follow-up Information       Follow up With Specialties Details Why Contact Info    Vicky Antonio NP Family Medicine In 1 week If symptoms worsen, As needed [address] [place] [address] [address]  [place] [address] [phone]          Follow-up with orthopedics if continued have pain lower in 3-5 days.             Gilberto Dunn NP  09/20/22 2585       Gilberto Dunn NP  09/20/22 0959       Gilberto Dunn NP  09/20/22 5537

## 2022-09-20 NOTE — ED TRIAGE NOTES
Patient reports severe left shoulder pain x 1 week. Feels like something is pinched. Difficulty lifting arm .

## 2023-01-18 ENCOUNTER — HOSPITAL ENCOUNTER (OUTPATIENT)
Dept: RADIOLOGY | Facility: HOSPITAL | Age: 48
Discharge: HOME | End: 2023-01-18
Attending: NURSE PRACTITIONER
Payer: COMMERCIAL

## 2023-01-18 VITALS — WEIGHT: 165.38 LBS | HEIGHT: 64 IN | BODY MASS INDEX: 28.24 KG/M2

## 2023-01-18 DIAGNOSIS — Z12.31 BREAST CANCER SCREENING BY MAMMOGRAM: ICD-10-CM

## 2023-01-18 DIAGNOSIS — M25.552 LEFT HIP PAIN: ICD-10-CM

## 2023-01-18 PROCEDURE — 77067 MAMMO DIGITAL SCREENING BILAT WITH TOMO: ICD-10-PCS | Mod: 26,,, | Performed by: RADIOLOGY

## 2023-01-18 PROCEDURE — 73522 X-RAY EXAM HIPS BI 3-4 VIEWS: CPT | Mod: TC

## 2023-01-18 PROCEDURE — 77067 SCR MAMMO BI INCL CAD: CPT | Mod: TC

## 2023-01-18 PROCEDURE — 77063 MAMMO DIGITAL SCREENING BILAT WITH TOMO: ICD-10-PCS | Mod: 26,,, | Performed by: RADIOLOGY

## 2023-01-18 PROCEDURE — 73522 X-RAY EXAM HIPS BI 3-4 VIEWS: CPT | Mod: 26,,, | Performed by: RADIOLOGY

## 2023-01-18 PROCEDURE — 77067 SCR MAMMO BI INCL CAD: CPT | Mod: 26,,, | Performed by: RADIOLOGY

## 2023-01-18 PROCEDURE — 73522 XR HIP 3 OR 4 VIEWS BILATERAL: ICD-10-PCS | Mod: 26,,, | Performed by: RADIOLOGY

## 2023-01-18 PROCEDURE — 77063 BREAST TOMOSYNTHESIS BI: CPT | Mod: 26,,, | Performed by: RADIOLOGY

## 2024-07-06 ENCOUNTER — HOSPITAL ENCOUNTER (INPATIENT)
Facility: HOSPITAL | Age: 49
LOS: 2 days | Discharge: HOME | DRG: 517 | End: 2024-07-10
Attending: STUDENT IN AN ORGANIZED HEALTH CARE EDUCATION/TRAINING PROGRAM | Admitting: HOSPITALIST
Payer: COMMERCIAL

## 2024-07-06 ENCOUNTER — HOSPITAL ENCOUNTER (EMERGENCY)
Facility: HOSPITAL | Age: 49
Discharge: HOME | End: 2024-07-06
Attending: STUDENT IN AN ORGANIZED HEALTH CARE EDUCATION/TRAINING PROGRAM
Payer: COMMERCIAL

## 2024-07-06 VITALS
SYSTOLIC BLOOD PRESSURE: 111 MMHG | DIASTOLIC BLOOD PRESSURE: 61 MMHG | OXYGEN SATURATION: 96 % | HEIGHT: 65 IN | TEMPERATURE: 98 F | RESPIRATION RATE: 20 BRPM | HEART RATE: 75 BPM | BODY MASS INDEX: 26.66 KG/M2 | WEIGHT: 160 LBS

## 2024-07-06 DIAGNOSIS — S82.042A CLOSED DISPLACED COMMINUTED FRACTURE OF LEFT PATELLA, INITIAL ENCOUNTER: ICD-10-CM
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
## 2024-07-06 DIAGNOSIS — S82.032A CLOSED DISPLACED TRANSVERSE FRACTURE OF LEFT PATELLA, INITIAL ENCOUNTER: Primary | ICD-10-CM

## 2024-07-06 DIAGNOSIS — M25.562 ACUTE PAIN OF LEFT KNEE: ICD-10-CM

## 2024-07-06 DIAGNOSIS — R07.9 CHEST PAIN: ICD-10-CM

## 2024-07-06 LAB
ABO + RH BLD: NORMAL
ALBUMIN SERPL BCP-MCNC: 3.9 G/DL (ref 3.5–5.2)
ALBUMIN SERPL BCP-MCNC: 4.2 G/DL (ref 3.5–5.2)
ALP SERPL-CCNC: 75 U/L (ref 55–135)
ALP SERPL-CCNC: 83 U/L (ref 55–135)
ALT SERPL W/O P-5'-P-CCNC: 19 U/L (ref 10–44)
ALT SERPL W/O P-5'-P-CCNC: 19 U/L (ref 10–44)
ANION GAP SERPL CALC-SCNC: 10 MMOL/L (ref 8–16)
ANION GAP SERPL CALC-SCNC: 11 MMOL/L (ref 8–16)
APTT PPP: 23.6 SEC (ref 21–32)
AST SERPL-CCNC: 16 U/L (ref 10–40)
AST SERPL-CCNC: 16 U/L (ref 10–40)
BASOPHILS # BLD AUTO: 0.04 K/UL (ref 0–0.2)
BASOPHILS # BLD AUTO: 0.06 K/UL (ref 0–0.2)
BASOPHILS NFR BLD: 0.3 % (ref 0–1.9)
BASOPHILS NFR BLD: 0.4 % (ref 0–1.9)
BILIRUB SERPL-MCNC: 0.4 MG/DL (ref 0.1–1)
BILIRUB SERPL-MCNC: 0.7 MG/DL (ref 0.1–1)
BLD GP AB SCN CELLS X3 SERPL QL: NORMAL
BUN SERPL-MCNC: 11 MG/DL (ref 6–20)
BUN SERPL-MCNC: 12 MG/DL (ref 6–20)
CALCIUM SERPL-MCNC: 10.1 MG/DL (ref 8.7–10.5)
CALCIUM SERPL-MCNC: 9.6 MG/DL (ref 8.7–10.5)
CHLORIDE SERPL-SCNC: 107 MMOL/L (ref 95–110)
CHLORIDE SERPL-SCNC: 108 MMOL/L (ref 95–110)
CO2 SERPL-SCNC: 21 MMOL/L (ref 23–29)
CO2 SERPL-SCNC: 21 MMOL/L (ref 23–29)
CREAT SERPL-MCNC: 0.8 MG/DL (ref 0.5–1.4)
CREAT SERPL-MCNC: 0.8 MG/DL (ref 0.5–1.4)
DIFFERENTIAL METHOD BLD: ABNORMAL
DIFFERENTIAL METHOD BLD: ABNORMAL
EOSINOPHIL # BLD AUTO: 0.1 K/UL (ref 0–0.5)
EOSINOPHIL # BLD AUTO: 0.1 K/UL (ref 0–0.5)
EOSINOPHIL NFR BLD: 0.7 % (ref 0–8)
EOSINOPHIL NFR BLD: 0.8 % (ref 0–8)
ERYTHROCYTE [DISTWIDTH] IN BLOOD BY AUTOMATED COUNT: 12.7 % (ref 11.5–14.5)
ERYTHROCYTE [DISTWIDTH] IN BLOOD BY AUTOMATED COUNT: 12.9 % (ref 11.5–14.5)
EST. GFR  (NO RACE VARIABLE): >60 ML/MIN/1.73 M^2
EST. GFR  (NO RACE VARIABLE): >60 ML/MIN/1.73 M^2
GLUCOSE SERPL-MCNC: 122 MG/DL (ref 70–110)
GLUCOSE SERPL-MCNC: 132 MG/DL (ref 70–110)
HCT VFR BLD AUTO: 45 % (ref 37–48.5)
HCT VFR BLD AUTO: 48 % (ref 37–48.5)
HGB BLD-MCNC: 14.9 G/DL (ref 12–16)
HGB BLD-MCNC: 15.8 G/DL (ref 12–16)
IMM GRANULOCYTES # BLD AUTO: 0.04 K/UL (ref 0–0.04)
IMM GRANULOCYTES # BLD AUTO: 0.08 K/UL (ref 0–0.04)
IMM GRANULOCYTES NFR BLD AUTO: 0.3 % (ref 0–0.5)
IMM GRANULOCYTES NFR BLD AUTO: 0.5 % (ref 0–0.5)
INR PPP: 1 (ref 0.8–1.2)
INR PPP: 1 (ref 0.8–1.2)
LYMPHOCYTES # BLD AUTO: 2.2 K/UL (ref 1–4.8)
LYMPHOCYTES # BLD AUTO: 2.6 K/UL (ref 1–4.8)
LYMPHOCYTES NFR BLD: 16.9 % (ref 18–48)
LYMPHOCYTES NFR BLD: 18.6 % (ref 18–48)
MCH RBC QN AUTO: 31 PG (ref 27–31)
MCH RBC QN AUTO: 31.7 PG (ref 27–31)
MCHC RBC AUTO-ENTMCNC: 32.9 G/DL (ref 32–36)
MCHC RBC AUTO-ENTMCNC: 33.1 G/DL (ref 32–36)
MCV RBC AUTO: 94 FL (ref 82–98)
MCV RBC AUTO: 96 FL (ref 82–98)
MONOCYTES # BLD AUTO: 0.6 K/UL (ref 0.3–1)
MONOCYTES # BLD AUTO: 0.9 K/UL (ref 0.3–1)
MONOCYTES NFR BLD: 5.2 % (ref 4–15)
MONOCYTES NFR BLD: 5.6 % (ref 4–15)
NEUTROPHILS # BLD AUTO: 11.9 K/UL (ref 1.8–7.7)
NEUTROPHILS # BLD AUTO: 8.9 K/UL (ref 1.8–7.7)
NEUTROPHILS NFR BLD: 74.8 % (ref 38–73)
NEUTROPHILS NFR BLD: 75.9 % (ref 38–73)
NRBC BLD-RTO: 0 /100 WBC
NRBC BLD-RTO: 0 /100 WBC
PLATELET # BLD AUTO: 217 K/UL (ref 150–450)
PLATELET # BLD AUTO: 256 K/UL (ref 150–450)
PMV BLD AUTO: 11.4 FL (ref 9.2–12.9)
PMV BLD AUTO: 12.7 FL (ref 9.2–12.9)
POTASSIUM SERPL-SCNC: 3.9 MMOL/L (ref 3.5–5.1)
POTASSIUM SERPL-SCNC: 5 MMOL/L (ref 3.5–5.1)
PROT SERPL-MCNC: 7.2 G/DL (ref 6–8.4)
PROT SERPL-MCNC: 7.6 G/DL (ref 6–8.4)
PROTHROMBIN TIME: 10.6 SEC (ref 9–12.5)
PROTHROMBIN TIME: 10.9 SEC (ref 9–12.5)
RBC # BLD AUTO: 4.81 M/UL (ref 4–5.4)
RBC # BLD AUTO: 4.98 M/UL (ref 4–5.4)
SODIUM SERPL-SCNC: 139 MMOL/L (ref 136–145)
SODIUM SERPL-SCNC: 139 MMOL/L (ref 136–145)
SPECIMEN OUTDATE: NORMAL
WBC # BLD AUTO: 11.84 K/UL (ref 3.9–12.7)
WBC # BLD AUTO: 15.65 K/UL (ref 3.9–12.7)

## 2024-07-06 PROCEDURE — 99284 EMERGENCY DEPT VISIT MOD MDM: CPT | Mod: 25

## 2024-07-06 PROCEDURE — 80053 COMPREHEN METABOLIC PANEL: CPT | Mod: 91

## 2024-07-06 PROCEDURE — 83735 ASSAY OF MAGNESIUM: CPT

## 2024-07-06 PROCEDURE — 93010 ELECTROCARDIOGRAM REPORT: CPT | Mod: ,,, | Performed by: INTERNAL MEDICINE

## 2024-07-06 PROCEDURE — 63600175 PHARM REV CODE 636 W HCPCS: Mod: JZ | Performed by: STUDENT IN AN ORGANIZED HEALTH CARE EDUCATION/TRAINING PROGRAM

## 2024-07-06 PROCEDURE — 85025 COMPLETE CBC W/AUTO DIFF WBC: CPT | Mod: 91

## 2024-07-06 PROCEDURE — 29505 APPLICATION LONG LEG SPLINT: CPT | Mod: LT

## 2024-07-06 PROCEDURE — 25000003 PHARM REV CODE 250: Performed by: STUDENT IN AN ORGANIZED HEALTH CARE EDUCATION/TRAINING PROGRAM

## 2024-07-06 PROCEDURE — 86901 BLOOD TYPING SEROLOGIC RH(D): CPT

## 2024-07-06 PROCEDURE — 96374 THER/PROPH/DIAG INJ IV PUSH: CPT

## 2024-07-06 PROCEDURE — 96375 TX/PRO/DX INJ NEW DRUG ADDON: CPT

## 2024-07-06 PROCEDURE — 85610 PROTHROMBIN TIME: CPT | Mod: 91

## 2024-07-06 PROCEDURE — 73562 X-RAY EXAM OF KNEE 3: CPT | Mod: TC,LT

## 2024-07-06 PROCEDURE — 85730 THROMBOPLASTIN TIME PARTIAL: CPT

## 2024-07-06 PROCEDURE — 73562 X-RAY EXAM OF KNEE 3: CPT | Mod: 26,LT,, | Performed by: RADIOLOGY

## 2024-07-06 PROCEDURE — 84466 ASSAY OF TRANSFERRIN: CPT

## 2024-07-06 PROCEDURE — 80053 COMPREHEN METABOLIC PANEL: CPT | Performed by: STUDENT IN AN ORGANIZED HEALTH CARE EDUCATION/TRAINING PROGRAM

## 2024-07-06 PROCEDURE — 99285 EMERGENCY DEPT VISIT HI MDM: CPT | Mod: 25,27

## 2024-07-06 PROCEDURE — 36415 COLL VENOUS BLD VENIPUNCTURE: CPT | Performed by: STUDENT IN AN ORGANIZED HEALTH CARE EDUCATION/TRAINING PROGRAM

## 2024-07-06 PROCEDURE — 83036 HEMOGLOBIN GLYCOSYLATED A1C: CPT

## 2024-07-06 PROCEDURE — 85610 PROTHROMBIN TIME: CPT | Performed by: STUDENT IN AN ORGANIZED HEALTH CARE EDUCATION/TRAINING PROGRAM

## 2024-07-06 PROCEDURE — 84100 ASSAY OF PHOSPHORUS: CPT

## 2024-07-06 PROCEDURE — 84134 ASSAY OF PREALBUMIN: CPT

## 2024-07-06 PROCEDURE — 93005 ELECTROCARDIOGRAM TRACING: CPT

## 2024-07-06 PROCEDURE — 82306 VITAMIN D 25 HYDROXY: CPT

## 2024-07-06 PROCEDURE — 63600175 PHARM REV CODE 636 W HCPCS

## 2024-07-06 PROCEDURE — 85025 COMPLETE CBC W/AUTO DIFF WBC: CPT | Performed by: STUDENT IN AN ORGANIZED HEALTH CARE EDUCATION/TRAINING PROGRAM

## 2024-07-06 RX ORDER — HYDROCODONE BITARTRATE AND ACETAMINOPHEN 10; 325 MG/1; MG/1
1 TABLET ORAL
Status: COMPLETED | OUTPATIENT
Start: 2024-07-06 | End: 2024-07-06

## 2024-07-06 RX ORDER — MORPHINE SULFATE 4 MG/ML
4 INJECTION, SOLUTION INTRAMUSCULAR; INTRAVENOUS
Status: COMPLETED | OUTPATIENT
Start: 2024-07-06 | End: 2024-07-06

## 2024-07-06 RX ORDER — ONDANSETRON HYDROCHLORIDE 2 MG/ML
4 INJECTION, SOLUTION INTRAVENOUS
Status: COMPLETED | OUTPATIENT
Start: 2024-07-06 | End: 2024-07-06

## 2024-07-06 RX ORDER — HYDROMORPHONE HYDROCHLORIDE 1 MG/ML
0.5 INJECTION, SOLUTION INTRAMUSCULAR; INTRAVENOUS; SUBCUTANEOUS
Status: COMPLETED | OUTPATIENT
Start: 2024-07-06 | End: 2024-07-06

## 2024-07-06 RX ORDER — HYDROCODONE BITARTRATE AND ACETAMINOPHEN 7.5; 325 MG/1; MG/1
1 TABLET ORAL EVERY 6 HOURS PRN
Qty: 16 TABLET | Refills: 0 | Status: ON HOLD | OUTPATIENT
Start: 2024-07-06 | End: 2024-07-07 | Stop reason: HOSPADM

## 2024-07-06 RX ORDER — FENTANYL CITRATE 50 UG/ML
75 INJECTION, SOLUTION INTRAMUSCULAR; INTRAVENOUS
Status: COMPLETED | OUTPATIENT
Start: 2024-07-06 | End: 2024-07-06

## 2024-07-06 RX ORDER — MORPHINE SULFATE 2 MG/ML
4 INJECTION, SOLUTION INTRAMUSCULAR; INTRAVENOUS
Status: COMPLETED | OUTPATIENT
Start: 2024-07-06 | End: 2024-07-06

## 2024-07-06 RX ORDER — KETOROLAC TROMETHAMINE 10 MG/1
10 TABLET, FILM COATED ORAL
Status: COMPLETED | OUTPATIENT
Start: 2024-07-06 | End: 2024-07-06

## 2024-07-06 RX ADMIN — HYDROCODONE BITARTRATE AND ACETAMINOPHEN 1 TABLET: 10; 325 TABLET ORAL at 02:07

## 2024-07-06 RX ADMIN — KETOROLAC TROMETHAMINE 10 MG: 10 TABLET, FILM COATED ORAL at 03:07

## 2024-07-06 RX ADMIN — FENTANYL CITRATE 75 MCG: 100 INJECTION, SOLUTION INTRAMUSCULAR; INTRAVENOUS at 10:07

## 2024-07-06 RX ADMIN — ONDANSETRON 4 MG: 2 INJECTION INTRAMUSCULAR; INTRAVENOUS at 09:07

## 2024-07-06 RX ADMIN — MORPHINE SULFATE 4 MG: 2 INJECTION, SOLUTION INTRAMUSCULAR; INTRAVENOUS at 09:07

## 2024-07-06 RX ADMIN — HYDROMORPHONE HYDROCHLORIDE 0.5 MG: 1 INJECTION, SOLUTION INTRAMUSCULAR; INTRAVENOUS; SUBCUTANEOUS at 12:07

## 2024-07-06 RX ADMIN — MORPHINE SULFATE 4 MG: 4 INJECTION INTRAVENOUS at 09:07

## 2024-07-06 RX ADMIN — ONDANSETRON 4 MG: 2 INJECTION INTRAMUSCULAR; INTRAVENOUS at 08:07

## 2024-07-06 NOTE — ED PROVIDER NOTES
Encounter Date: 2024       History     Chief Complaint   Patient presents with    Fall    Knee Injury     Arrived from [place] for further evaluation of left knee fracture     This patient is a otherwise healthy 48-year-old female who presents to the [facility] ED as a transfer from outside emergency room where she was found to have a closed left patellar fracture sustained from ground level fall of mechanical nature.  Patient was transported to [facility] for ortho evaluation.  On upon arrival patient is in left knee immobilizer and endorses nausea after Dilaudid given from outside ED but otherwise manageable pain.  She denies loss of sensation, paresthesias or loss of function of her left foot.  She does endorse pain with knee flexion and extension.  She reports no other injuries and takes no medications.  No past medical history.    The history is provided by the patient, medical records and the EMS personnel. No  was used.     Review of patient's allergies indicates:  No Known Allergies  Past Medical History:   Diagnosis Date    Known health problems: none      Past Surgical History:   Procedure Laterality Date    APPENDECTOMY       SECTION      HYSTEROSCOPY WITH DILATION AND CURETTAGE OF UTERUS N/A 8/15/2018    Procedure: HYSTEROSCOPY, WITH DILATION AND CURETTAGE OF UTERUS;  Surgeon: Elder Salazar MD;  Location: [facility] OR;  Service: OB/GYN;  Laterality: N/A;    THERMAL ABLATION OF ENDOMETRIUM N/A 8/15/2018    Procedure: ABLATION, ENDOMETRIUM, THERMAL;  Surgeon: Elder Salazar MD;  Location: [facility] OR;  Service: OB/GYN;  Laterality: N/A;    TUBAL LIGATION       Family History   Problem Relation Name Age of Onset    Hypertension Mother      COPD Mother      Diabetes Mother      Hypertension Father      Breast cancer Maternal Aunt      Breast cancer Maternal Cousin       Social History     Tobacco Use    Smoking status: Every Day     Current packs/day: 0.25     Average packs/day: 0.3  packs/day for 30.0 years (7.5 ttl pk-yrs)     Types: Cigarettes    Smokeless tobacco: Never   Substance Use Topics    Alcohol use: No    Drug use: No     Review of Systems    Physical Exam     Initial Vitals [07/06/24 1841]   BP Pulse Resp Temp SpO2   134/74 83 12 97.6 °F (36.4 °C) 100 %      MAP       --         Physical Exam    Nursing note and vitals reviewed.  Constitutional: She appears well-developed and well-nourished.   HENT:   Head: Normocephalic and atraumatic.   Eyes: Conjunctivae and EOM are normal. Pupils are equal, round, and reactive to light.   Neck: Neck supple.   Normal range of motion.  Cardiovascular:  Normal rate, normal heart sounds and intact distal pulses.           Pulmonary/Chest: Breath sounds normal.   Abdominal: Abdomen is soft. Bowel sounds are normal.   Musculoskeletal:         General: Normal range of motion.      Cervical back: Normal range of motion and neck supple.      Comments: Left knee is without skin breaks and positive swelling/ballotable.  Neurovascularly intact of distal extremity.  Range of motion limited to pain but sensation intact.  Ecchymosis surrounding swelling.  Patient remaining in knee immobilizer placed at outside facility.  No other obvious gross injuries or deformities.     Neurological: She is alert and oriented to person, place, and time. She has normal strength. GCS score is 15. GCS eye subscore is 4. GCS verbal subscore is 5. GCS motor subscore is 6.   Skin: Skin is warm and dry. Capillary refill takes less than 2 seconds.   Psychiatric: She has a normal mood and affect. Her behavior is normal. Judgment and thought content normal.         ED Course   Procedures  Labs Reviewed   CBC W/ AUTO DIFFERENTIAL - Abnormal; Notable for the following components:       Result Value    WBC 15.65 (*)     MCH 31.7 (*)     Gran # (ANC) 11.9 (*)     Immature Grans (Abs) 0.08 (*)     Gran % 75.9 (*)     Lymph % 16.9 (*)     All other components within normal limits    COMPREHENSIVE METABOLIC PANEL - Abnormal; Notable for the following components:    CO2 21 (*)     Glucose 132 (*)     All other components within normal limits   PHOSPHORUS - Abnormal; Notable for the following components:    Phosphorus 2.5 (*)     All other components within normal limits    Narrative:     add on GHGB per Ja Yancey DO on  07/07/2024 @ 00:11.      ADD ON TRANSFERRIN, MAGNESIUM AND PHOS PER DR JA YANCEY/ORDER#   [id number], [id number] AND [id number] @ 23:54   PROTIME-INR   APTT   HEMOGLOBIN A1C   MAGNESIUM   PHOSPHORUS   PREALBUMIN   TRANSFERRIN   VITAMIN D   TRANSFERRIN    Narrative:     add on GHGB per Ja Yancey DO on  07/07/2024 @ 00:11.      ADD ON TRANSFERRIN, MAGNESIUM AND PHOS PER DR JA YANCEY/ORDER#   [id number], [id number] AND [id number] @ 23:54   MAGNESIUM    Narrative:     add on GHGB per Ja Yancey DO on  07/07/2024 @ 00:11.      ADD ON TRANSFERRIN, MAGNESIUM AND PHOS PER DR JA YANCEY/ORDER#   [id number], [id number] AND [id number] @ 23:54   HEMOGLOBIN A1C   VITAMIN D   PREALBUMIN   TYPE & SCREEN     EKG Readings: (Independently Interpreted)   Initial Reading: No STEMI.       Imaging Results              CT Knee Without Contrast Left (Final result)  Result time 07/06/24 20:35:46   Procedure changed from CT Knee With Contrast Left     Final result by Alejo Mays DO (07/06/24 20:35:46)                   Impression:      Displaced/distracted and comminuted fracture of the patella as detailed above.      Electronically signed by: Alejo Mays  Date:    07/06/2024  Time:    20:35               Narrative:    EXAMINATION:  CT KNEE WITHOUT CONTRAST LEFT    CLINICAL HISTORY:  Knee trauma, dislocation suspected (Age >= 5y); left patella fracture;    TECHNIQUE:  Axial CT images of the left knee with sagittal and coronal reformats without intravenous contrast.    COMPARISON:  Radiographs from earlier today.    FINDINGS:  There is a severely displaced and  distracted, moderately comminuted fracture of the midpole of the patella, with multiple small bone fragments within the intervening soft tissues.  No additional fractures are seen.  The proximal tibia and fibula and the distal femur are intact.  There is extensive soft tissue edema and hemorrhage in the anterior soft tissues.  There is no evidence of soft tissue gas.  Joint spaces are preserved.  There is no joint effusion.  Soft tissues are otherwise unremarkable.  Muscle bulk is maintained.  Neurovascular structures are grossly unremarkable.                                       X-Ray Knee 3 View Left (Final result)  Result time 07/06/24 19:59:39      Final result by Zane Marquis MD (07/06/24 19:59:39)                   Impression:      Similar overall configuration of an acute, displaced, distracted and comminuted left patellar midpole fracture, as above.      Electronically signed by: Zane Marquis MD  Date:    07/06/2024  Time:    19:59               Narrative:    EXAMINATION:  XR KNEE 3 VIEW LEFT    CLINICAL HISTORY:  Unspecified fall, initial encounter    TECHNIQUE:  AP, lateral, and Merchant views of the left knee were performed.    COMPARISON:  Left knee series earlier same day    FINDINGS:  Redemonstrated acute, displaced, comminuted and distracted left patellar midpole fracture similar in overall positioning and alignment from prior study.  The posttraumatic hematoma formation within the prepatellar soft tissues as well as surrounding soft tissue swelling mostly anteriorly.  Underlying prepatellar bursitis not excluded.  There is similar nonspecific moderate suprapatellar joint effusion as well.  No subcutaneous emphysema or radiopaque foreign body.  No new displaced fracture.  No dislocation or destructive osseous process.  Osseous structures appear osteopenic.  Otherwise no change.                                    X-Rays:   Independently Interpreted Readings:   Other Readings:  Acute displaced and  comminuted patellar fracture of the left knee on plain film.  CT of the left knee with 2 mm thin slices and three-dimensional reconstruction obtained for orthopedic involvement    Medications   morphine injection 4 mg (4 mg Intravenous Given 7/6/24 2108)   ondansetron injection 4 mg (4 mg Intravenous Given 7/6/24 2050)     Medical Decision Making  48-year-old female as described above presenting as a transfer from outside facility for orthopedic surgical consult for LEFT closed, comminuted, displaced patellar fracture.  Physical exam reassuring and neurovascularly intact of affected limb, no other injuries discovered.  Pain well controlled with IV morphine and ondansetron.  Orthopedic surgery consulted and agreed to evaluate patient, appreciate recs.  Basic labs ordered as well as preop labs should ortho team surgical necessity.  Patient is signed out to oncoming provider, Dr. Govind Hill pending Orthopedic recommendations.  I anticipate discharge of this patient home with return precautions and follow up instructions with ambulatory referral to orthopedic clinic.    Amount and/or Complexity of Data Reviewed  Labs: ordered.  Radiology: ordered.    Risk  Prescription drug management.                                      Clinical Impression:  Final diagnoses:  [W19.XXXA] Fall  [S82.032A] Closed displaced transverse fracture of left patella, initial encounter (Primary)  [M25.562] Acute pain of left knee                 Demetris Guerra MD  Resident  07/07/24 0027

## 2024-07-06 NOTE — ED NOTES
Ice pack applied to Left knee. MD aware of continued pain and pt requested to be medicated prior to knee brace being applied.

## 2024-07-06 NOTE — Clinical Note
"Catalina"Josselyn Abrams was seen and treated in our emergency department on 7/6/2024.  She may return to work on 07/12/2024.       If you have any questions or concerns, please don't hesitate to call.      Jared Dawn MD"

## 2024-07-06 NOTE — ED PROVIDER NOTES
Encounter Date: 2024       History     Chief Complaint   Patient presents with    Knee Injury     left     48-year-old female with no reported medical history.  She presents to ED chief complaints of left knee pain status post mechanical fall; slipped and fell onto left knee just prior to arrival.  She was unable to bear weight onto the affected extremity.  Pain is worsened with movement and improves with rest.  She was treated with 50 mics of fentanyl given by EMS just prior to arrival.    The history is provided by the patient and the EMS personnel. No  was used.     Review of patient's allergies indicates:  No Known Allergies  Past Medical History:   Diagnosis Date    Known health problems: none      Past Surgical History:   Procedure Laterality Date    APPENDECTOMY       SECTION      HYSTEROSCOPY WITH DILATION AND CURETTAGE OF UTERUS N/A 8/15/2018    Procedure: HYSTEROSCOPY, WITH DILATION AND CURETTAGE OF UTERUS;  Surgeon: Elder Salazar MD;  Location: [facility] OR;  Service: OB/GYN;  Laterality: N/A;    THERMAL ABLATION OF ENDOMETRIUM N/A 8/15/2018    Procedure: ABLATION, ENDOMETRIUM, THERMAL;  Surgeon: Elder Salazar MD;  Location: [facility] OR;  Service: OB/GYN;  Laterality: N/A;    TUBAL LIGATION       Family History   Problem Relation Name Age of Onset    Hypertension Mother      COPD Mother      Diabetes Mother      Hypertension Father      Breast cancer Maternal Aunt      Breast cancer Maternal Cousin       Social History     Tobacco Use    Smoking status: Every Day     Current packs/day: 0.25     Average packs/day: 0.3 packs/day for 30.0 years (7.5 ttl pk-yrs)     Types: Cigarettes    Smokeless tobacco: Never   Substance Use Topics    Alcohol use: No    Drug use: No     Review of Systems   Constitutional: Negative.    HENT: Negative.     Eyes: Negative.    Respiratory: Negative.     Cardiovascular: Negative.    Gastrointestinal: Negative.    Endocrine: Negative.     Genitourinary: Negative.    Musculoskeletal:  Positive for arthralgias, joint swelling and myalgias.   Skin: Negative.    Neurological: Negative.    Hematological: Negative.    Psychiatric/Behavioral: Negative.     All other systems reviewed and are negative.      Physical Exam     Initial Vitals [07/06/24 0848]   BP Pulse Resp Temp SpO2   (!) 142/72 74 15 97.8 °F (36.6 °C) 99 %      MAP       --         Physical Exam    Nursing note and vitals reviewed.  Constitutional: She appears well-developed and well-nourished.   HENT:   Head: Normocephalic.   Eyes: Pupils are equal, round, and reactive to light.   Neck:   Normal range of motion.  Cardiovascular:  Normal rate.           Pulmonary/Chest: Breath sounds normal.   Abdominal: Abdomen is soft.   Musculoskeletal:      Cervical back: Normal range of motion.      Comments: Swollen anterior left knee, tenderness to palpation light touch.  Refused to flex extend or range knee     Neurological: She is alert and oriented to person, place, and time. GCS score is 15. GCS eye subscore is 4. GCS verbal subscore is 5. GCS motor subscore is 6.   Skin: Skin is warm and dry. Capillary refill takes less than 2 seconds.         ED Course   Procedures  Labs Reviewed   CBC W/ AUTO DIFFERENTIAL - Abnormal; Notable for the following components:       Result Value    Gran # (ANC) 8.9 (*)     Gran % 74.8 (*)     All other components within normal limits   COMPREHENSIVE METABOLIC PANEL - Abnormal; Notable for the following components:    CO2 21 (*)     Glucose 122 (*)     All other components within normal limits   PROTIME-INR   PROTIME-INR          Imaging Results              X-Ray Knee 3 View Left (Final result)  Result time 07/06/24 10:01:24   Procedure changed from X-Ray Knee Complete 4 or More Views Left     Final result by Lilly Dougherty MD (07/06/24 10:01:24)                   Impression:      Acute, displaced, distracted, comminuted left patellar midpole fracture.   Additional details are provided above.      Electronically signed by: Kumar Dougherty MD  Date:    07/06/2024  Time:    10:01               Narrative:    EXAMINATION:  XR KNEE 3 VIEW LEFT    CLINICAL HISTORY:  pain;fall; Unspecified fall, initial encounter    TECHNIQUE:  AP, lateral, and Merchant views of the left knee were performed.    COMPARISON:  None    FINDINGS:  The bones are osteopenic.  There is an acute, displaced, comminuted, distracted left patellar midpole fracture present.  There is post-traumatic neck in hematoma formation noted within the prepatellar soft tissues.  Underlying prepatellar bursitis is not excluded.  There is a moderate right knee joint effusion.  No tibiofemoral joint space narrowing.  No chondrocalcinosis.                                       Medications   morphine injection 4 mg (4 mg Intravenous Given 7/6/24 0937)   ondansetron injection 4 mg (4 mg Intravenous Given 7/6/24 0932)   fentaNYL 50 mcg/mL injection 75 mcg (75 mcg Intravenous Given 7/6/24 1034)   HYDROmorphone injection 0.5 mg (0.5 mg Intravenous Given 7/6/24 1222)   HYDROcodone-acetaminophen  mg per tablet 1 tablet (1 tablet Oral Given 7/6/24 1457)   ketorolac tablet 10 mg (10 mg Oral Given 7/6/24 1527)     Medical Decision Making  Ddx fracture, dislocation, contusion, sprain, others  Neurovascularly intact on exam  X-ray shows acute, displaced, distracted, comminuted left patellar midpole fracture.  Knee immobilizer placed   She was given multiple analgesics and was actually going to discharged for outpatient follow-up but then she reports no relief in pain      Amount and/or Complexity of Data Reviewed  Labs: ordered.  Radiology: ordered.    Risk  Prescription drug management.                                      Clinical Impression:  Final diagnoses:  [W19.XXXA] Fall  [W19.XXXA] Fall, initial encounter (Primary)  [S82.042A] Closed displaced comminuted fracture of left patella, initial encounter          ED  Disposition Condition    Discharge Stable          ED Prescriptions       Medication Sig Dispense Start Date End Date Auth. Provider    HYDROcodone-acetaminophen (NORCO) 7.5-325 mg per tablet Take 1 tablet by mouth every 6 (six) hours as needed for Pain. 16 tablet 7/6/2024 -- Jared Dawn MD          Follow-up Information       Follow up With Specialties Details Why Contact Info    Vicky Antonio, NP Family Medicine  As needed [address] [place] [address] Dr  [place] [address] [phone]               Jared Dawn MD  07/06/24 6666       Jared Dawn MD  07/06/24 9038

## 2024-07-06 NOTE — ED TRIAGE NOTES
Pt presents to the er with c/o L. 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 Pt states she landed directly on left knee. Pt denies hitting head or LOC.

## 2024-07-06 NOTE — DISCHARGE INSTRUCTIONS
Elevate affected extremity.  Apply ice to left knee for 15 minutes at a time every 3 hours to help with swelling  May take Norco every 6 hours for pain  Follow up with ortho as referred

## 2024-07-06 NOTE — Clinical Note
"Catalina"Josselyn Abrams was seen and treated in our emergency department on 7/6/2024.  She may return to work on 07/17/2024.       If you have any questions or concerns, please don't hesitate to call.      Bakari Fajardo MD"

## 2024-07-06 NOTE — ED TRIAGE NOTES
Patient identifiers for Catalina Abrams 48 y.o. female checked and correct.    Pt arrives to ED via POV from [place]. Pt had a slip and fall on to her left knee today while at a [employer] market. Confirmed patellar fracture. 10/10 pain. Pt states she was being discharged from [place] but wanted further evaluation.     Past Medical History:   Diagnosis Date    Known health problems: none      Allergies reported: Review of patient's allergies indicates:  No Known Allergies

## 2024-07-07 ENCOUNTER — ANESTHESIA EVENT (OUTPATIENT)
Dept: SURGERY | Facility: HOSPITAL | Age: 49
End: 2024-07-07
Payer: COMMERCIAL

## 2024-07-07 DIAGNOSIS — S82.042D CLOSED DISPLACED COMMINUTED FRACTURE OF LEFT PATELLA WITH ROUTINE HEALING, SUBSEQUENT ENCOUNTER: Primary | ICD-10-CM

## 2024-07-07 PROBLEM — S82.002A CLOSED FRACTURE OF LEFT PATELLA: Status: ACTIVE | Noted: 2024-07-07

## 2024-07-07 LAB
25(OH)D3+25(OH)D2 SERPL-MCNC: 41 NG/ML (ref 30–96)
ANION GAP SERPL CALC-SCNC: 10 MMOL/L (ref 8–16)
BASOPHILS # BLD AUTO: 0.03 K/UL (ref 0–0.2)
BASOPHILS NFR BLD: 0.3 % (ref 0–1.9)
BUN SERPL-MCNC: 11 MG/DL (ref 6–20)
CALCIUM SERPL-MCNC: 9.6 MG/DL (ref 8.7–10.5)
CHLORIDE SERPL-SCNC: 105 MMOL/L (ref 95–110)
CO2 SERPL-SCNC: 22 MMOL/L (ref 23–29)
CREAT SERPL-MCNC: 0.8 MG/DL (ref 0.5–1.4)
DIFFERENTIAL METHOD BLD: ABNORMAL
EOSINOPHIL # BLD AUTO: 0.1 K/UL (ref 0–0.5)
EOSINOPHIL NFR BLD: 0.9 % (ref 0–8)
ERYTHROCYTE [DISTWIDTH] IN BLOOD BY AUTOMATED COUNT: 12.9 % (ref 11.5–14.5)
EST. GFR  (NO RACE VARIABLE): >60 ML/MIN/1.73 M^2
ESTIMATED AVG GLUCOSE: 97 MG/DL (ref 68–131)
GLUCOSE SERPL-MCNC: 114 MG/DL (ref 70–110)
HBA1C MFR BLD: 5 % (ref 4–5.6)
HCT VFR BLD AUTO: 43 % (ref 37–48.5)
HGB BLD-MCNC: 13.7 G/DL (ref 12–16)
IMM GRANULOCYTES # BLD AUTO: 0.03 K/UL (ref 0–0.04)
IMM GRANULOCYTES NFR BLD AUTO: 0.3 % (ref 0–0.5)
LYMPHOCYTES # BLD AUTO: 2.8 K/UL (ref 1–4.8)
LYMPHOCYTES NFR BLD: 26.4 % (ref 18–48)
MAGNESIUM SERPL-MCNC: 2.3 MG/DL (ref 1.6–2.6)
MCH RBC QN AUTO: 31.4 PG (ref 27–31)
MCHC RBC AUTO-ENTMCNC: 31.9 G/DL (ref 32–36)
MCV RBC AUTO: 98 FL (ref 82–98)
MONOCYTES # BLD AUTO: 0.8 K/UL (ref 0.3–1)
MONOCYTES NFR BLD: 7.6 % (ref 4–15)
NEUTROPHILS # BLD AUTO: 6.9 K/UL (ref 1.8–7.7)
NEUTROPHILS NFR BLD: 64.5 % (ref 38–73)
NRBC BLD-RTO: 0 /100 WBC
OHS QRS DURATION: 80 MS
OHS QTC CALCULATION: 442 MS
PHOSPHATE SERPL-MCNC: 2.5 MG/DL (ref 2.7–4.5)
PLATELET # BLD AUTO: 215 K/UL (ref 150–450)
PMV BLD AUTO: 12.1 FL (ref 9.2–12.9)
POTASSIUM SERPL-SCNC: 4 MMOL/L (ref 3.5–5.1)
PREALB SERPL-MCNC: 27 MG/DL (ref 20–43)
RBC # BLD AUTO: 4.37 M/UL (ref 4–5.4)
SODIUM SERPL-SCNC: 137 MMOL/L (ref 136–145)
TRANSFERRIN SERPL-MCNC: 325 MG/DL (ref 200–375)
WBC # BLD AUTO: 10.63 K/UL (ref 3.9–12.7)

## 2024-07-07 PROCEDURE — 25000003 PHARM REV CODE 250: Performed by: NURSE PRACTITIONER

## 2024-07-07 PROCEDURE — 36415 COLL VENOUS BLD VENIPUNCTURE: CPT | Performed by: INTERNAL MEDICINE

## 2024-07-07 PROCEDURE — 94761 N-INVAS EAR/PLS OXIMETRY MLT: CPT

## 2024-07-07 PROCEDURE — 25000003 PHARM REV CODE 250: Performed by: INTERNAL MEDICINE

## 2024-07-07 PROCEDURE — 97116 GAIT TRAINING THERAPY: CPT

## 2024-07-07 PROCEDURE — 63600175 PHARM REV CODE 636 W HCPCS: Performed by: INTERNAL MEDICINE

## 2024-07-07 PROCEDURE — 96376 TX/PRO/DX INJ SAME DRUG ADON: CPT

## 2024-07-07 PROCEDURE — 63600175 PHARM REV CODE 636 W HCPCS: Performed by: NURSE PRACTITIONER

## 2024-07-07 PROCEDURE — 96375 TX/PRO/DX INJ NEW DRUG ADDON: CPT

## 2024-07-07 PROCEDURE — A4216 STERILE WATER/SALINE, 10 ML: HCPCS | Performed by: HOSPITALIST

## 2024-07-07 PROCEDURE — 85025 COMPLETE CBC W/AUTO DIFF WBC: CPT | Performed by: INTERNAL MEDICINE

## 2024-07-07 PROCEDURE — 25000003 PHARM REV CODE 250: Performed by: HOSPITALIST

## 2024-07-07 PROCEDURE — 80048 BASIC METABOLIC PNL TOTAL CA: CPT | Performed by: INTERNAL MEDICINE

## 2024-07-07 PROCEDURE — 97161 PT EVAL LOW COMPLEX 20 MIN: CPT

## 2024-07-07 PROCEDURE — G0378 HOSPITAL OBSERVATION PER HR: HCPCS

## 2024-07-07 PROCEDURE — 99223 1ST HOSP IP/OBS HIGH 75: CPT | Mod: 57,,, | Performed by: ORTHOPAEDIC SURGERY

## 2024-07-07 PROCEDURE — 63600175 PHARM REV CODE 636 W HCPCS: Performed by: HOSPITALIST

## 2024-07-07 RX ORDER — TALC
6 POWDER (GRAM) TOPICAL NIGHTLY PRN
Status: DISCONTINUED | OUTPATIENT
Start: 2024-07-07 | End: 2024-07-10 | Stop reason: HOSPADM

## 2024-07-07 RX ORDER — HYDROCODONE BITARTRATE AND ACETAMINOPHEN 5; 325 MG/1; MG/1
1 TABLET ORAL EVERY 4 HOURS PRN
Qty: 30 TABLET | Refills: 0 | Status: SHIPPED | OUTPATIENT
Start: 2024-07-07 | End: 2024-07-08 | Stop reason: HOSPADM

## 2024-07-07 RX ORDER — IPRATROPIUM BROMIDE AND ALBUTEROL SULFATE 2.5; .5 MG/3ML; MG/3ML
3 SOLUTION RESPIRATORY (INHALATION) EVERY 6 HOURS PRN
Status: DISCONTINUED | OUTPATIENT
Start: 2024-07-07 | End: 2024-07-10 | Stop reason: HOSPADM

## 2024-07-07 RX ORDER — ACETAMINOPHEN 325 MG/1
650 TABLET ORAL EVERY 8 HOURS PRN
Status: DISCONTINUED | OUTPATIENT
Start: 2024-07-07 | End: 2024-07-08

## 2024-07-07 RX ORDER — GLUCAGON 1 MG
1 KIT INJECTION
Status: DISCONTINUED | OUTPATIENT
Start: 2024-07-07 | End: 2024-07-10 | Stop reason: HOSPADM

## 2024-07-07 RX ORDER — OXYCODONE HYDROCHLORIDE 10 MG/1
10 TABLET ORAL ONCE
Status: COMPLETED | OUTPATIENT
Start: 2024-07-07 | End: 2024-07-08

## 2024-07-07 RX ORDER — ONDANSETRON HYDROCHLORIDE 2 MG/ML
4 INJECTION, SOLUTION INTRAVENOUS EVERY 8 HOURS PRN
Status: DISCONTINUED | OUTPATIENT
Start: 2024-07-07 | End: 2024-07-10 | Stop reason: HOSPADM

## 2024-07-07 RX ORDER — ACETAMINOPHEN 325 MG/1
650 TABLET ORAL EVERY 6 HOURS PRN
Status: DISCONTINUED | OUTPATIENT
Start: 2024-07-07 | End: 2024-07-07

## 2024-07-07 RX ORDER — ACETAMINOPHEN 325 MG/1
650 TABLET ORAL EVERY 4 HOURS PRN
Status: DISCONTINUED | OUTPATIENT
Start: 2024-07-07 | End: 2024-07-07

## 2024-07-07 RX ORDER — IBUPROFEN 200 MG
24 TABLET ORAL
Status: DISCONTINUED | OUTPATIENT
Start: 2024-07-07 | End: 2024-07-10 | Stop reason: HOSPADM

## 2024-07-07 RX ORDER — NALOXONE HCL 0.4 MG/ML
0.02 VIAL (ML) INJECTION
Status: DISCONTINUED | OUTPATIENT
Start: 2024-07-07 | End: 2024-07-10 | Stop reason: HOSPADM

## 2024-07-07 RX ORDER — MORPHINE SULFATE 2 MG/ML
2 INJECTION, SOLUTION INTRAMUSCULAR; INTRAVENOUS EVERY 6 HOURS PRN
Status: DISCONTINUED | OUTPATIENT
Start: 2024-07-07 | End: 2024-07-07

## 2024-07-07 RX ORDER — ACETAMINOPHEN 325 MG/1
650 TABLET ORAL EVERY 4 HOURS PRN
Start: 2024-07-07 | End: 2024-07-09 | Stop reason: HOSPADM

## 2024-07-07 RX ORDER — IBUPROFEN 200 MG
1 TABLET ORAL DAILY
Status: DISCONTINUED | OUTPATIENT
Start: 2024-07-07 | End: 2024-07-10 | Stop reason: HOSPADM

## 2024-07-07 RX ORDER — METHOCARBAMOL 500 MG/1
500 TABLET, FILM COATED ORAL 4 TIMES DAILY
Status: DISCONTINUED | OUTPATIENT
Start: 2024-07-07 | End: 2024-07-08

## 2024-07-07 RX ORDER — IBUPROFEN 200 MG
16 TABLET ORAL
Status: DISCONTINUED | OUTPATIENT
Start: 2024-07-07 | End: 2024-07-10 | Stop reason: HOSPADM

## 2024-07-07 RX ORDER — OXYCODONE HYDROCHLORIDE 5 MG/1
5 TABLET ORAL EVERY 4 HOURS PRN
Status: DISCONTINUED | OUTPATIENT
Start: 2024-07-07 | End: 2024-07-08

## 2024-07-07 RX ORDER — HYDROCODONE BITARTRATE AND ACETAMINOPHEN 5; 325 MG/1; MG/1
1 TABLET ORAL EVERY 6 HOURS PRN
Status: DISCONTINUED | OUTPATIENT
Start: 2024-07-07 | End: 2024-07-07

## 2024-07-07 RX ORDER — ACETAMINOPHEN 325 MG/1
650 TABLET ORAL EVERY 6 HOURS
Status: DISCONTINUED | OUTPATIENT
Start: 2024-07-08 | End: 2024-07-08

## 2024-07-07 RX ORDER — PROCHLORPERAZINE EDISYLATE 5 MG/ML
5 INJECTION INTRAMUSCULAR; INTRAVENOUS EVERY 6 HOURS PRN
Status: DISCONTINUED | OUTPATIENT
Start: 2024-07-07 | End: 2024-07-10 | Stop reason: HOSPADM

## 2024-07-07 RX ORDER — AMOXICILLIN 250 MG
1 CAPSULE ORAL 2 TIMES DAILY
Status: DISCONTINUED | OUTPATIENT
Start: 2024-07-07 | End: 2024-07-10 | Stop reason: HOSPADM

## 2024-07-07 RX ORDER — HYDROMORPHONE HYDROCHLORIDE 1 MG/ML
0.5 INJECTION, SOLUTION INTRAMUSCULAR; INTRAVENOUS; SUBCUTANEOUS EVERY 4 HOURS PRN
Status: DISCONTINUED | OUTPATIENT
Start: 2024-07-07 | End: 2024-07-08

## 2024-07-07 RX ORDER — POLYETHYLENE GLYCOL 3350 17 G/17G
17 POWDER, FOR SOLUTION ORAL DAILY
Status: DISCONTINUED | OUTPATIENT
Start: 2024-07-07 | End: 2024-07-10 | Stop reason: HOSPADM

## 2024-07-07 RX ORDER — SODIUM CHLORIDE 0.9 % (FLUSH) 0.9 %
10 SYRINGE (ML) INJECTION EVERY 8 HOURS
Status: DISCONTINUED | OUTPATIENT
Start: 2024-07-07 | End: 2024-07-10 | Stop reason: HOSPADM

## 2024-07-07 RX ADMIN — Medication 10 ML: at 09:07

## 2024-07-07 RX ADMIN — HYDROMORPHONE HYDROCHLORIDE 0.5 MG: 1 INJECTION, SOLUTION INTRAMUSCULAR; INTRAVENOUS; SUBCUTANEOUS at 09:07

## 2024-07-07 RX ADMIN — HYDROCODONE BITARTRATE AND ACETAMINOPHEN 1 TABLET: 5; 325 TABLET ORAL at 08:07

## 2024-07-07 RX ADMIN — MORPHINE SULFATE 2 MG: 2 INJECTION, SOLUTION INTRAMUSCULAR; INTRAVENOUS at 06:07

## 2024-07-07 RX ADMIN — MORPHINE SULFATE 2 MG: 2 INJECTION, SOLUTION INTRAMUSCULAR; INTRAVENOUS at 04:07

## 2024-07-07 RX ADMIN — HYDROCODONE BITARTRATE AND ACETAMINOPHEN 1 TABLET: 5; 325 TABLET ORAL at 01:07

## 2024-07-07 RX ADMIN — ONDANSETRON 4 MG: 2 INJECTION INTRAMUSCULAR; INTRAVENOUS at 08:07

## 2024-07-07 RX ADMIN — HYDROCODONE BITARTRATE AND ACETAMINOPHEN 1 TABLET: 5; 325 TABLET ORAL at 02:07

## 2024-07-07 RX ADMIN — METHOCARBAMOL 500 MG: 500 TABLET ORAL at 09:07

## 2024-07-07 RX ADMIN — SENNOSIDES AND DOCUSATE SODIUM 1 TABLET: 50; 8.6 TABLET ORAL at 09:07

## 2024-07-07 RX ADMIN — Medication 10 ML: at 02:07

## 2024-07-07 NOTE — PLAN OF CARE
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 Plan for outpatient surgical repair per ortho and symptom management with pain control now and PT/OT. PT/OT consulted, has immobilizer and ice to the LLE now on exam. Hx of tobacco use, defers nicotine patch at this time. Has multimodals for pain control. Labs wnl. Vitamin D 41 at goal. Will see how thearpy goes and how pain control goes. 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 Possible dc later today vs tomorrow AM pending above.

## 2024-07-07 NOTE — CONSULTS
[facility] - Surgery  Orthopedics  Consult Note    Patient Name: Catalina Abrams  MRN: [id number]  Admission Date: 2024  Hospital Length of Stay: 0 days  Attending Provider: Jennifer Chilel MD  Primary Care Provider: Vicky Antonio NP        Inpatient consult to Orthopedic Surgery  Consult performed by: YENNIFER Mercado MD  Consult ordered by: Demetris Guerra MD        Subjective:     Principal Problem:Closed fracture of left patella    Chief Complaint:   Chief Complaint   Patient presents with    Fall    Knee Injury     Arrived from [place] for further evaluation of left knee fracture        HPI: Catalina Abrams is a 48 y.o. female with PMH significant for tobacco use presenting with left knee pain.  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  She had immediate pain and difficulty bearing weight.  She was brought to [place] ED where x-rays revealed a midpole patellar fracture.  She was transferred to [facility] for further care.  Patient denies any head trauma or LOC. The patient denies prior hx of falls. Patient denies numbness and tingling. Denies any other musculoskeletal pain or injuries. No known history of prior left lower extremity injury or surgery. Walks w/out assisted devices at baseline. Doesn't take any anticoagulation at baseline.  Patient lives at home alone with no help available.    They deny IV drug use.  They have a 30 pack-year smoking history   They deny alcohol use.   They deny immunosuppressant medications.  They deny chemotherapy.  They deny radiation therapy.       Past Medical History:   Diagnosis Date    Known health problems: none        Past Surgical History:   Procedure Laterality Date    APPENDECTOMY       SECTION      HYSTEROSCOPY WITH DILATION AND CURETTAGE OF UTERUS N/A 8/15/2018    Procedure: HYSTEROSCOPY, WITH DILATION AND CURETTAGE OF UTERUS;  Surgeon: Elder Salazar MD;  Location:  [facility] OR;  Service: OB/GYN;  Laterality: N/A;    THERMAL ABLATION OF ENDOMETRIUM N/A 8/15/2018    Procedure: ABLATION, ENDOMETRIUM, THERMAL;  Surgeon: Elder Salazar MD;  Location: [facility] OR;  Service: OB/GYN;  Laterality: N/A;    TUBAL LIGATION  1998       Review of patient's allergies indicates:  No Known Allergies    Current Facility-Administered Medications   Medication    acetaminophen tablet 650 mg    HYDROcodone-acetaminophen 5-325 mg per tablet 1 tablet    morphine injection 2 mg     Family History       Problem Relation (Age of Onset)    Breast cancer Maternal Aunt, Maternal Cousin    COPD Mother    Diabetes Mother    Hypertension Mother, Father          Tobacco Use    Smoking status: Every Day     Current packs/day: 0.25     Average packs/day: 0.3 packs/day for 30.0 years (7.5 ttl pk-yrs)     Types: Cigarettes    Smokeless tobacco: Never   Substance and Sexual Activity    Alcohol use: No    Drug use: No    Sexual activity: Yes     ROS  Constitutional: negative for fevers  Eyes: negative visual changes  ENT: negative for hearing loss  Respiratory: negative for dyspnea  Cardiovascular: negative for chest pain  Gastrointestinal: negative for abdominal pain  Genitourinary: negative for dysuria  Neurological: negative for headaches  Behavioral/Psych: negative for hallucinations  Endocrine: negative for temperature intolerance    Objective:     Vital Signs (Most Recent):  Temp: 99.5 °F (37.5 °C) (07/06/24 2155)  Pulse: 72 (07/07/24 0133)  Resp: 18 (07/07/24 0159)  BP: 126/62 (07/07/24 0133)  SpO2: 98 % (07/07/24 0133) Vital Signs (24h Range):  Temp:  [97.3 °F (36.3 °C)-99.5 °F (37.5 °C)] 99.5 °F (37.5 °C)  Pulse:  [69-86] 72  Resp:  [12-20] 18  SpO2:  [96 %-100 %] 98 %  BP: (111-158)/() 126/62     Weight: 72.6 kg (160 lb)     Body mass index is 26.63 kg/m².    No intake or output data in the 24 hours ending 07/07/24 0357     Ortho/SPM Exam  General:  no acute distress, appears stated age   Neuro: alert  and oriented x3  Psych: normal mood  Head: normocephalic, atraumatic.  Eyes: no scleral icterus  Mouth: moist mucous membranes  Cardiovascular: extremities warm and well perfused  Lungs: breathing comfortably, equal chest rise bilat  Skin: clean, dry, intact (any exceptions noted in below musculoskeletal exam)    MSK:  RUE:  - Skin intact throughout, no open wounds  - No swelling  - No ecchymosis, erythema, or signs of cellulitis  - NonTTP throughout  - AROM and PROM of the shoulder, elbow, wrist, and hand intact without pain  - Axillary/AIN/PIN/Radial/Median/Ulnar Nerves assessed in isolation without deficit  - SILT throughout  - Compartments soft  - Radial artery palpated   - Capillary Refill <3s    LUE:  - Skin intact throughout, no open wounds  - No swelling  - No ecchymosis, erythema, or signs of cellulitis  - NonTTP throughout  - AROM and PROM of the shoulder, elbow, wrist, and hand intact without pain  - Axillary/AIN/PIN/Radial/Median/Ulnar Nerves assessed in isolation without deficit  - SILT throughout  - Compartments soft  - Radial artery palpated   - Capillary Refill <3s    RLE:  - Skin intact throughout, no open wounds  - No swelling  - No ecchymosis, erythema, or signs of cellulitis  - NonTTP throughout  - AROM and PROM of the hip, knee, ankle, and foot intact without pain  - TA/EHL/Gastroc/FHL assessed in isolation without deficit  - SILT throughout  - Compartments soft  - DP and PT palpated  - Capillary Refill <3s  - Negative Log [facility]  - Negative [facility]    LLE:  - Skin intact throughout, no open wounds  - significant swelling to anterior knee with surrounding ecchymosis and erythema  - TTP over anterior knee  - AROM and PROM of the hip, ankle, and foot intact without pain  - pain with any range of motion of knee  - loss of extensor mechanism  - TA/EHL/Gastroc/FHL assessed in isolation without deficit  - SILT throughout  - Compartments soft  - DP and PT palpated  - Capillary Refill <3s  - Negative  Log roll  - Negative [facility]    Spine/pelvis/axial body:  No pain with compression of pelvis  No chest wall or abdominal tenderness         Significant Labs: CBC:   Recent Labs   Lab 07/06/24  1026 07/06/24  1941   WBC 11.84 15.65*   HGB 14.9 15.8   HCT 45.0 48.0    256     CMP:   Recent Labs   Lab 07/06/24  1026 07/06/24  1941    139   K 5.0 3.9    107   CO2 21* 21*   * 132*   BUN 11 12   CREATININE 0.8 0.8   CALCIUM 9.6 10.1   PROT 7.2 7.6   ALBUMIN 3.9 4.2   BILITOT 0.4 0.7   ALKPHOS 75 83   AST 16 16   ALT 19 19   ANIONGAP 10 11       Prealbumin:   Recent Labs   Lab 07/06/24  0018   PREALBUMIN 27     All pertinent labs within the past 24 hours have been reviewed.    Significant Imaging: I have reviewed and interpreted all pertinent imaging results/findings.  XR left knee:  Displaced midpole patellar fracture  CT left knee:  Comminuted and displaced midpole patellar fracture  Assessment/Plan:     * Closed fracture of left patella  Catalina Abrams is a 48 y.o. female with PMH of tobacco use presenting with a left displaced patellar fracture that occurred after a fall onto the left knee yesterday morning.  Closed, neurovascularly intact.  On exam, she is significant swelling to the anterior knee.  Loss of extensor mechanism.  Patient wrapped in Ace wrap and placed into a knee immobilizer.  She lives alone and has no support at home.  Patient worried about being able to care for herself.    - admitted for trial of PT/OT  - WBAT LLE  - remain in knee immobilizer with leg in extension  - multimodal pain control  - patient consented for surgical fixation of left patella at a later date  - monitor swelling    Dispo: Pending PT progress            YENNIFER Mercado MD  Orthopedics  [facility] - Surgery

## 2024-07-07 NOTE — SUBJECTIVE & OBJECTIVE
Past Medical History:   Diagnosis Date    Known health problems: none        Past Surgical History:   Procedure Laterality Date    APPENDECTOMY       SECTION      HYSTEROSCOPY WITH DILATION AND CURETTAGE OF UTERUS N/A 8/15/2018    Procedure: HYSTEROSCOPY, WITH DILATION AND CURETTAGE OF UTERUS;  Surgeon: Elder Salazar MD;  Location: [facility] OR;  Service: OB/GYN;  Laterality: N/A;    THERMAL ABLATION OF ENDOMETRIUM N/A 8/15/2018    Procedure: ABLATION, ENDOMETRIUM, THERMAL;  Surgeon: Elder Salazar MD;  Location: [facility] OR;  Service: OB/GYN;  Laterality: N/A;    TUBAL LIGATION         Review of patient's allergies indicates:  No Known Allergies    Current Facility-Administered Medications on File Prior to Encounter   Medication    [COMPLETED] fentaNYL 50 mcg/mL injection 75 mcg    [COMPLETED] HYDROcodone-acetaminophen  mg per tablet 1 tablet    [COMPLETED] HYDROmorphone injection 0.5 mg    [COMPLETED] ketorolac tablet 10 mg    [COMPLETED] morphine injection 4 mg    [COMPLETED] ondansetron injection 4 mg     Current Outpatient Medications on File Prior to Encounter   Medication Sig    HYDROcodone-acetaminophen (NORCO) 7.5-325 mg per tablet Take 1 tablet by mouth every 6 (six) hours as needed for Pain.    ibuprofen (ADVIL,MOTRIN) 800 MG tablet Take 1 tablet (800 mg total) by mouth every 8 (eight) hours as needed for Pain.    omeprazole (PRILOSEC) 20 MG capsule Take 1 capsule (20 mg total) by mouth once daily.    senna-docusate 8.6-50 mg (PERICOLACE) 8.6-50 mg per tablet Take 1 tablet by mouth 2 (two) times daily. (Patient not taking: No sig reported)     Family History       Problem Relation (Age of Onset)    Breast cancer Maternal Aunt, Maternal Cousin    COPD Mother    Diabetes Mother    Hypertension Mother, Father          Tobacco Use    Smoking status: Every Day     Current packs/day: 0.25     Average packs/day: 0.3 packs/day for 30.0 years (7.5 ttl pk-yrs)     Types: Cigarettes    Smokeless  tobacco: Never   Substance and Sexual Activity    Alcohol use: No    Drug use: No    Sexual activity: Yes     Review of Systems   Constitutional:  Negative for appetite change.   Respiratory:  Negative for cough and shortness of breath.    Cardiovascular:  Negative for chest pain and leg swelling.   Gastrointestinal:  Negative for abdominal distention, abdominal pain, constipation and diarrhea.   Genitourinary:  Negative for difficulty urinating and dysuria.   Musculoskeletal:  Positive for arthralgias and joint swelling.   Neurological:  Negative for dizziness and headaches.     Objective:     Vital Signs (Most Recent):  Temp: 99.5 °F (37.5 °C) (07/06/24 2155)  Pulse: 75 (07/07/24 0102)  Resp: 19 (07/07/24 0102)  BP: 134/67 (07/07/24 0102)  SpO2: 99 % (07/07/24 0102) Vital Signs (24h Range):  Temp:  [97.3 °F (36.3 °C)-99.5 °F (37.5 °C)] 99.5 °F (37.5 °C)  Pulse:  [69-86] 75  Resp:  [12-20] 19  SpO2:  [96 %-100 %] 99 %  BP: (111-158)/() 134/67     Weight: 72.6 kg (160 lb)  Body mass index is 26.63 kg/m².     Physical Exam  Constitutional:       Appearance: Normal appearance.   HENT:      Head: Normocephalic and atraumatic.      Mouth/Throat:      Mouth: Mucous membranes are moist.   Cardiovascular:      Rate and Rhythm: Normal rate and regular rhythm.      Pulses: Normal pulses.      Heart sounds: Normal heart sounds.   Pulmonary:      Effort: Pulmonary effort is normal.      Breath sounds: Normal breath sounds. No rales.   Abdominal:      General: Abdomen is flat. Bowel sounds are normal. There is no distension.      Palpations: Abdomen is soft.      Tenderness: There is no abdominal tenderness.   Musculoskeletal:         General: Swelling and tenderness present. Normal range of motion.      Cervical back: Normal range of motion and neck supple.   Skin:     General: Skin is warm and dry.   Neurological:      General: No focal deficit present.      Mental Status: She is alert and oriented to person, place, and  time.   Psychiatric:         Mood and Affect: Mood normal.                Significant Labs: All pertinent labs within the past 24 hours have been reviewed.    Significant Imaging: I have reviewed all pertinent imaging results/findings within the past 24 hours.

## 2024-07-07 NOTE — PLAN OF CARE
PT hui completed- see note for details, goals and POC established.     Problem: Physical Therapy  Goal: Physical Therapy Goal  Description: Goals to be met by: 24     Patient will increase functional independence with mobility by performin. Supine to sit with Set-up [place]  2. Sit to stand transfer with Modified [place]  3. Bed to chair transfer with Modified [place] using Rolling Walker  4. Gait  x 150 feet with Modified [place] using Rolling Walker.     DME Recommendation  Patient demonstrates a mobility limitation that significantly impairs their ability to participate in one or more mobility related activities of daily living. Patient's mobility limitation cannot be sufficiently resolved with the use of a cane, but can be sufficiently resolved with the use of a rolling walker.The use of a rolling walker will considerably improve their ability to participate in MRADLs. Patient will use the walker on a regular basis at home.   Outcome: Progressing   2024

## 2024-07-07 NOTE — DISCHARGE INSTRUCTIONS
Diagnosis:   1. Closed displaced transverse fracture of left patella, initial encounter    2. Fall    3. Acute pain of left knee        Tests you had showed:   Labs Reviewed   CBC W/ AUTO DIFFERENTIAL - Abnormal; Notable for the following components:       Result Value    WBC 15.65 (*)     MCH 31.7 (*)     Gran # (ANC) 11.9 (*)     Immature Grans (Abs) 0.08 (*)     Gran % 75.9 (*)     Lymph % 16.9 (*)     All other components within normal limits   COMPREHENSIVE METABOLIC PANEL - Abnormal; Notable for the following components:    CO2 21 (*)     Glucose 132 (*)     All other components within normal limits   PROTIME-INR   APTT   TYPE & SCREEN      CT Knee Without Contrast Left   Final Result      Displaced/distracted and comminuted fracture of the patella as detailed above.         Electronically signed by: Alejo Mays   Date:    07/06/2024   Time:    20:35      X-Ray Knee 3 View Left   Final Result      Similar overall configuration of an acute, displaced, distracted and comminuted left patellar midpole fracture, as above.         Electronically signed by: Zane Marquis MD   Date:    07/06/2024   Time:    19:59          Treatments you received were:   Medications   morphine injection 4 mg (4 mg Intravenous Given 7/6/24 2108)   ondansetron injection 4 mg (4 mg Intravenous Given 7/6/24 2050)       Home Care Instructions:  - Medications: Continue taking your home medications as prescribed    Follow-Up Plan:  - Follow-up with: Primary care doctor within 1  days  - Follow-up with: orthopedic surgery clinic within 1  days  - Additional testing and/or evaluation will be directed by your primary doctor    Return to the Emergency Department for symptoms including but not limited to: worsening symptoms, severe back pain, shortness of breath or chest pain, vomiting with inability to hold down fluids, blood in vomit or poop, fevers greater than 100.4°F, passing out/fainting/unconsciousness, or other concerning symptoms.     If  you do not have a primary care doctor, you may contact the one listed on your discharge paperwork or you may also call the [facility] Appointment Desk at [phone] to schedule an appointment and establish care with one. It is important to your health that you have a primary care doctor.    Please take all medications as directed. All medications may potentially have side-effects and it is impossible to predict which medications may give you side-effects or what side-effects (if any) they will give you. If you feel that you are having a negative effect or side-effect of any medication you should immediately stop taking them and seek medical attention. If you feel that you are having a life-threatening reaction call [phone]

## 2024-07-07 NOTE — PT/OT/SLP EVAL
Physical Therapy Evaluation and Treatment      Patient Name:  Catalina Abrams   MRN:  [id number]    Recommendations:     Discharge Recommendations: Low Intensity Therapy   Discharge Equipment Recommendations: walker, rolling   Barriers to discharge: None    Assessment:     Catalina Abrams is a 48 y.o. female admitted with a medical diagnosis of Closed fracture of left patella.  She presents with the following impairments/functional limitations: weakness, impaired endurance, pain, decreased ROM, decreased lower extremity function, impaired functional mobility, impaired balance, gait instability     Pt agreeable and tolerated PT evaluation well this session. Pt educated on WBAT: left lower extremity precautions prior to start of treatment with pt verbalizing understanding. Pt kept in knee immobilizer through mobility. Pt able to amb to bedside chair with RW and CGA, but demonstrated decreased WB through LLE. Patient currently demonstrates a need for low intensity therapy on a scheduled basis secondary to a decline in functional status due to injury.    Rehab Prognosis: Good; patient would benefit from acute skilled PT services to address these deficits and reach maximum level of function.    Recent Surgery: * No surgery found *      Plan:     During this hospitalization, patient to be seen 4 x/week to address the identified rehab impairments via gait training, therapeutic activities, therapeutic exercises, neuromuscular re-education and progress toward the following goals:    Plan of Care Expires:  08/06/24    Subjective     Chief Complaint: L knee pain with amb  Patient/Family Comments/goals: To get stronger  Pain/Comfort:  Pain Rating 1: 5/10  Location - Side 1: Left  Location - Orientation 1: generalized  Location 1: knee  Pain Addressed 1: Reposition, Distraction  Pain Rating Post-Intervention 1: 6/10    Patients cultural, spiritual, Mosque conflicts given the current situation:      Patient History:     Living  Environment: Pt lives alone in [facility] with no FERMIN. Bathroom: tub/shower combo   Prior Level of Function: IND  DME owned: none  Caregiver Assistance: Significant other that can provide limited assistance    Objective:     Communicated with RN prior to session.  Patient found HOB elevated with knee immobilizer  upon PT entry to room.    General Precautions: Standard, fall  Orthopedic Precautions:LLE weight bearing as tolerated   Braces: Knee immobilizer  Respiratory Status: Room air    Exams:  Gross Motor Coordination:  WFL  Sensation:    -       Intact  RLE ROM: WFL  RLE Strength: WFL  LLE ROM: WFL except knee ROM not tested due to knee imo donned  LLE Strength: WFL except knee strength not tested    Functional Mobility:    Bed Mobility:   Supine > Sit: minimum assistance for LLE management  Scooting: contact guard assistance    Transfers:   Sit <> Stand Transfer: contact guard assistance from EOB using rolling walker   Bed <> Chair: contact guard assistance using rolling walker     Balance:   Sitting balance: GOOD-: Incosistently Maintains balance through MODERATE excursions of active trunk movement,     Standing balance:   FAIR: Maintains without assist but unable to take challenges  FAIR: Needs CONTACT GUARD during gait                 Gait:  Distance: ~8 steps to bedside chair   Assistive Device: RW  Assistance Level: contact guard assistance  Gait Assessment: Pt amb with decreased WB through LLE and mild increase in pain. No LOB.      AM-PAC 6 CLICK MOBILITY  Total Score:18       Treatment & Education:  Pt educated on tip to reduce fall risk and safety with mobility and using call button for assistance from nursing staff with OOB mobility.  Pt educated on sitting up in chair throughout most of day  All questions answered within the scope of PT.  White board updated accordingly.      Patient left up in chair with all lines intact, call button in reach, and family present.    GOALS:   Multidisciplinary Problems        Physical Therapy Goals          Problem: Physical Therapy    Goal Priority Disciplines Outcome Goal Variances Interventions   Physical Therapy Goal     PT, PT/OT Progressing     Description: Goals to be met by: 24     Patient will increase functional independence with mobility by performin. Supine to sit with Set-up [place]  2. Sit to stand transfer with Modified [place]  3. Bed to chair transfer with Modified [place] using Rolling Walker  4. Gait  x 150 feet with Modified [place] using Rolling Walker.     DME Recommendation  Patient demonstrates a mobility limitation that significantly impairs their ability to participate in one or more mobility related activities of daily living. Patient's mobility limitation cannot be sufficiently resolved with the use of a cane, but can be sufficiently resolved with the use of a rolling walker.The use of a rolling walker will considerably improve their ability to participate in MRADLs. Patient will use the walker on a regular basis at home.                            History:     Past Medical History:   Diagnosis Date    Known health problems: none        Past Surgical History:   Procedure Laterality Date    APPENDECTOMY       SECTION      HYSTEROSCOPY WITH DILATION AND CURETTAGE OF UTERUS N/A 8/15/2018    Procedure: HYSTEROSCOPY, WITH DILATION AND CURETTAGE OF UTERUS;  Surgeon: Elder Salazar MD;  Location: [facility] OR;  Service: OB/GYN;  Laterality: N/A;    THERMAL ABLATION OF ENDOMETRIUM N/A 8/15/2018    Procedure: ABLATION, ENDOMETRIUM, THERMAL;  Surgeon: Elder Salazar MD;  Location: [facility] OR;  Service: OB/GYN;  Laterality: N/A;    TUBAL LIGATION         Time Tracking:     PT Received On: 24  PT Start Time: 1018     PT Stop Time: 1036  PT Total Time (min): 18 min     Billable Minutes: Evaluation 10 and Gait Training 8      2024

## 2024-07-07 NOTE — NURSING
Nurses Note -- 4 Eyes      7/7/2024   4:00 AM      Skin assessed during: Admit      [x] No Altered Skin Integrity Present    [x]Prevention Measures Documented      [] Yes- Altered Skin Integrity Present or Discovered   [] LDA Added if Not in Epic (Describe Wound)   [] New Altered Skin Integrity was Present on Admit and Documented in LDA   [] Wound Image Taken    Wound Care Consulted? No    Attending Nurse:  Yanique Orona RN/Staff Member:  Annie Blackburn RN

## 2024-07-07 NOTE — ANESTHESIA PREPROCEDURE EVALUATION
[facility]  Anesthesia Pre-Operative Evaluation         Patient Name: Catalina Abrams  YOB: 1975  MRN: [id number]    SUBJECTIVE:     Pre-operative evaluation for Procedure(s) (LRB):  ORIF, FRACTURE, PATELLA - LEFT, C-arm clockside, Ancef (Left)  Scheduled for 2024    HPI 2024:  Catalina Abrams is a 48 y.o. female with hx of tobacco use. Here with L patellar fracture. Presents for above procedure.    Prev airway:   None on file               Patient Active Problem List   Diagnosis    Endometrial polyp    Menorrhalgia    Iron deficiency anemia due to chronic blood loss    Closed fracture of left patella       Review of patient's allergies indicates:  No Known Allergies    Outpatient Medications:  No current facility-administered medications on file prior to encounter.     Current Outpatient Medications on File Prior to Encounter   Medication Sig Dispense Refill    omeprazole (PRILOSEC) 20 MG capsule Take 1 capsule (20 mg total) by mouth once daily. 30 capsule 0        Current Inpatient Medications:   nicotine  1 patch Transdermal Daily    polyethylene glycol  17 g Oral Daily    sodium chloride 0.9%  10 mL Intravenous Q8H       Past Surgical History:   Procedure Laterality Date    APPENDECTOMY       SECTION      HYSTEROSCOPY WITH DILATION AND CURETTAGE OF UTERUS N/A 8/15/2018    Procedure: HYSTEROSCOPY, WITH DILATION AND CURETTAGE OF UTERUS;  Surgeon: Elder Salazar MD;  Location: [facility] OR;  Service: OB/GYN;  Laterality: N/A;    THERMAL ABLATION OF ENDOMETRIUM N/A 8/15/2018    Procedure: ABLATION, ENDOMETRIUM, THERMAL;  Surgeon: Elder Salazar MD;  Location: [facility] OR;  Service: OB/GYN;  Laterality: N/A;    TUBAL LIGATION         Social History     Socioeconomic History    Marital status: Single   Tobacco Use    Smoking status: Every Day     Current packs/day: 0.25     Average packs/day: 0.3 packs/day for 30.0 years (7.5 ttl pk-yrs)     Types: Cigarettes     Smokeless tobacco: Never   Substance and Sexual Activity    Alcohol use: No    Drug use: No    Sexual activity: Yes   Social History Narrative    Plans from [facility]     Admit Charu8/14/2018     Menorrhagia.  Endometrial polyp.  Iron deficiency anemia.    Consent signed and on chart.    All questions were answered.    We'll proceed with D&C hysteroscopy Novasure procedure with possible Myosure.     Endometrial Biopsy 63988/3/2018     Menorrhagia.    Return to clinic next Tuesday for preop.  Will perform D&C hysteroscopy GTA on 8/15/2018.     [facility]     Menorrhagia.  Endometrial polyp.  Iron deficiency anemia.    Return to clinic for endometrial biopsy.    Send today's report to Vicky De Santiago    Transvaginal ultrasound today.    TSH today.    Release of information of CBC.    Continue iron.    Visit Summary    Ordered:    VAG U/S NON OB        Prescriptions:    SIG: Norco  mg oral tablet,  days, Dispense #1 Tablet, 0 Refills    Directions: take 1 tab 30 mins prior to procedure    SIG: Valium 10 mg oral tablet, 1 days, Dispense #1 Tablet, 0 Refills    Directions: 1 PO 30 minutes prior to procedure       OBJECTIVE:     Weight:  Wt Readings from Last 1 Encounters:   07/07/24 72.6 kg (160 lb)     Body mass index is 26.63 kg/m².    Recent Blood Pressure Readings:  BP Readings from Last 3 Encounters:   07/07/24 125/72   07/06/24 111/61   09/20/22 130/80       Vital Signs Range (Last 24H):  Temp:  [35.7 °C (96.3 °F)-37.5 °C (99.5 °F)]   Pulse:  [70-86]   Resp:  [12-20]   BP: (100-138)/(52-81)   SpO2:  [97 %-100 %]       CBC:   Lab Results   Component Value Date    WBC 10.63 07/07/2024    HGB 13.7 07/07/2024    HCT 43.0 07/07/2024    MCV 98 07/07/2024     07/07/2024       CMP:     Chemistry        Component Value Date/Time     07/07/2024 0403    K 4.0 07/07/2024 0403     07/07/2024 0403    CO2 22 (L) 07/07/2024 0403    BUN 11 07/07/2024 0403    CREATININE 0.8  07/07/2024 0403     (H) 07/07/2024 0403        Component Value Date/Time    CALCIUM 9.6 07/07/2024 0403    ALKPHOS 83 07/06/2024 1941    AST 16 07/06/2024 1941    ALT 19 07/06/2024 1941    BILITOT 0.7 07/06/2024 1941            INR:  Lab Results   Component Value Date    INR 1.0 07/06/2024    INR 1.0 07/06/2024       Diagnostic Studies:      EKG:     Results for orders placed or performed during the hospital encounter of 07/06/24   EKG 12-lead    Collection Time: 07/06/24  7:38 PM   Result Value Ref Range    QRS Duration 80 ms    [facility] QTC Calculation 442 ms    Narrative    Test Reason : W19.XXXA,    Vent. Rate : 073 BPM     Atrial Rate : 073 BPM     P-R Int : 134 ms          QRS Dur : 080 ms      QT Int : 402 ms       P-R-T Axes : 062 049 053 degrees     QTc Int : 442 ms    Normal sinus rhythm  Normal ECG  When compared with ECG of 14-AUG-2018 13:05,  No significant change was found  Confirmed by Ector Griffith MD ([id number]) on 7/7/2024 10:53:17 AM    Referred By: [email or web address]   SELF           Confirmed By:Ector Griffith MD       2D Echo:    No results found for this or any previous visit.    No results found for this or any previous visit.    No results found for this or any previous visit.      ASSESSMENT/PLAN:           Pre-op Assessment    I have reviewed the Patient Summary Reports.     I have reviewed the Nursing Notes. I have reviewed the NPO Status.   I have reviewed the Medications.     Review of Systems  Anesthesia Hx:  No problems with previous Anesthesia   History of prior surgery of interest to airway management or planning:  Previous anesthesia: General        Denies Family Hx of Anesthesia complications.    Denies Personal Hx of Anesthesia complications.                    Social:  Smoker       Cardiovascular:      Denies Hypertension.    Denies CABG/stent.     Denies CHF.                                 Pulmonary:    Denies COPD.  Denies Asthma.                    Hepatic/GI:      Denies GERD.              Neurological:    Denies CVA.    Denies Seizures.                                Endocrine:  Denies Diabetes.         Denies Obesity / BMI > 30      Physical Exam  General: Well nourished, Cooperative, Alert and Oriented    Airway:  Mallampati: II   Mouth Opening: Normal  TM Distance: Normal  Tongue: Normal  Neck ROM: Normal ROM    Chest/Lungs:  Normal Respiratory Rate    Heart:  Rate: Normal        Anesthesia Plan  Type of Anesthesia, risks & benefits discussed:    Anesthesia Type: Gen ETT, Regional  Intra-op Monitoring Plan: Standard ASA Monitors  Post Op Pain Control Plan: multimodal analgesia and peripheral nerve block  Induction:  IV  Airway Plan: Direct, Post-Induction  Informed Consent: Informed consent signed with the Patient and all parties understand the risks and agree with anesthesia plan.  All questions answered.   ASA Score: 2  Day of Surgery Review of History & Physical: H&P Update referred to the surgeon/provider.    Ready For Surgery From Anesthesia Perspective.     .

## 2024-07-07 NOTE — H&P
Maxim[facility] - Emergency Dept  [facility]  History & Physical    Patient Name: Catalina Abrams  MRN: [id number]  Patient Class: OP- Observation  Admission Date: 2024  Attending Physician: Jennifer Chilel MD   Primary Care Provider: Vicky Antonio NP         Patient information was obtained from patient and ER records.     Subjective:     Principal Problem:Closed fracture of left patella    Chief Complaint:   Chief Complaint   Patient presents with    Fall    Knee Injury     Arrived from [place] for further evaluation of left knee fracture        HPI: 49 yo F who presented to [facility] as a transfer for L comminuted patellar fracture after a ground level fall for ortho evaluation. She is neurovascularly intact and otherwise w/o any other issues. Ortho evaluated her in the ED and recommended admission for PT and likely ORIF after her swelling goes down within the next few days. She c/o pain and swelling  at the L knee  . She denies fever, CP, SOB, N/V/D     Past Medical History:   Diagnosis Date    Known health problems: none        Past Surgical History:   Procedure Laterality Date    APPENDECTOMY       SECTION      HYSTEROSCOPY WITH DILATION AND CURETTAGE OF UTERUS N/A 8/15/2018    Procedure: HYSTEROSCOPY, WITH DILATION AND CURETTAGE OF UTERUS;  Surgeon: Elder Salazar MD;  Location: [facility] OR;  Service: OB/GYN;  Laterality: N/A;    THERMAL ABLATION OF ENDOMETRIUM N/A 8/15/2018    Procedure: ABLATION, ENDOMETRIUM, THERMAL;  Surgeon: Elder Salazar MD;  Location: [facility] OR;  Service: OB/GYN;  Laterality: N/A;    TUBAL LIGATION         Review of patient's allergies indicates:  No Known Allergies    Current Facility-Administered Medications on File Prior to Encounter   Medication    [COMPLETED] fentaNYL 50 mcg/mL injection 75 mcg    [COMPLETED] HYDROcodone-acetaminophen  mg per tablet 1 tablet    [COMPLETED] HYDROmorphone injection 0.5 mg    [COMPLETED] ketorolac tablet 10 mg     [COMPLETED] morphine injection 4 mg    [COMPLETED] ondansetron injection 4 mg     Current Outpatient Medications on File Prior to Encounter   Medication Sig    HYDROcodone-acetaminophen (NORCO) 7.5-325 mg per tablet Take 1 tablet by mouth every 6 (six) hours as needed for Pain.    ibuprofen (ADVIL,MOTRIN) 800 MG tablet Take 1 tablet (800 mg total) by mouth every 8 (eight) hours as needed for Pain.    omeprazole (PRILOSEC) 20 MG capsule Take 1 capsule (20 mg total) by mouth once daily.    senna-docusate 8.6-50 mg (PERICOLACE) 8.6-50 mg per tablet Take 1 tablet by mouth 2 (two) times daily. (Patient not taking: No sig reported)     Family History       Problem Relation (Age of Onset)    Breast cancer Maternal Aunt, Maternal Cousin    COPD Mother    Diabetes Mother    Hypertension Mother, Father          Tobacco Use    Smoking status: Every Day     Current packs/day: 0.25     Average packs/day: 0.3 packs/day for 30.0 years (7.5 ttl pk-yrs)     Types: Cigarettes    Smokeless tobacco: Never   Substance and Sexual Activity    Alcohol use: No    Drug use: No    Sexual activity: Yes     Review of Systems   Constitutional:  Negative for appetite change.   Respiratory:  Negative for cough and shortness of breath.    Cardiovascular:  Negative for chest pain and leg swelling.   Gastrointestinal:  Negative for abdominal distention, abdominal pain, constipation and diarrhea.   Genitourinary:  Negative for difficulty urinating and dysuria.   Musculoskeletal:  Positive for arthralgias and joint swelling.   Neurological:  Negative for dizziness and headaches.     Objective:     Vital Signs (Most Recent):  Temp: 99.5 °F (37.5 °C) (07/06/24 2155)  Pulse: 75 (07/07/24 0102)  Resp: 19 (07/07/24 0102)  BP: 134/67 (07/07/24 0102)  SpO2: 99 % (07/07/24 0102) Vital Signs (24h Range):  Temp:  [97.3 °F (36.3 °C)-99.5 °F (37.5 °C)] 99.5 °F (37.5 °C)  Pulse:  [69-86] 75  Resp:  [12-20] 19  SpO2:  [96 %-100 %] 99 %  BP: (111-158)/() 134/67      Weight: 72.6 kg (160 lb)  Body mass index is 26.63 kg/m².     Physical Exam  Constitutional:       Appearance: Normal appearance.   HENT:      Head: Normocephalic and atraumatic.      Mouth/Throat:      Mouth: Mucous membranes are moist.   Cardiovascular:      Rate and Rhythm: Normal rate and regular rhythm.      Pulses: Normal pulses.      Heart sounds: Normal heart sounds.   Pulmonary:      Effort: Pulmonary effort is normal.      Breath sounds: Normal breath sounds. No rales.   Abdominal:      General: Abdomen is flat. Bowel sounds are normal. There is no distension.      Palpations: Abdomen is soft.      Tenderness: There is no abdominal tenderness.   Musculoskeletal:         General: Swelling and tenderness present. Normal range of motion.      Cervical back: Normal range of motion and neck supple.   Skin:     General: Skin is warm and dry.   Neurological:      General: No focal deficit present.      Mental Status: She is alert and oriented to person, place, and time.   Psychiatric:         Mood and Affect: Mood normal.                Significant Labs: All pertinent labs within the past 24 hours have been reviewed.    Significant Imaging: I have reviewed all pertinent imaging results/findings within the past 24 hours.  Assessment/Plan:     * Closed fracture of left patella  - orhto consulted ( will need ORIF after swelling improves )  - control pain   - PT eval  - low risk for surgery           VTE Risk Mitigation (From admission, onward)      None               On 07/07/2024, patient should be placed in hospital observation services under my care.             Jens Mccabe MD  Department of Hospital Medicine  [facility] - Emergency Dept

## 2024-07-07 NOTE — ED NOTES
Assumed care of patient at this time. Patient is on continuous vital sign monitoring, family member at bedside.patient is in hospital gown, patient endorses 10/10 pain in left leg.provider made aware. Patient denies numbness, tingling or loss of sensation to left leg. Patient resting on the stretcher at this time.

## 2024-07-07 NOTE — ASSESSMENT & PLAN NOTE
- orhto consulted ( will need ORIF after swelling improves )  - control pain   - PT eval  - low risk for surgery

## 2024-07-07 NOTE — ASSESSMENT & PLAN NOTE
Catalina Abrams is a 48 y.o. female with PMH of tobacco use presenting with a left displaced patellar fracture that occurred after a fall onto the left knee yesterday morning.  Closed, neurovascularly intact.  On exam, she is significant swelling to the anterior knee.  Loss of extensor mechanism.  Patient wrapped in Ace wrap and placed into a knee immobilizer.  She lives alone and has no support at home.  Patient worried about being able to care for herself.    - admitted for trial of PT/OT  - WBAT LLE  - remain in knee immobilizer with leg in extension  - multimodal pain control  - patient consented for surgical fixation of left patella at a later date  - monitor swelling    Dispo: Pending PT progress

## 2024-07-07 NOTE — HPI
Catalina Abrams is a 48 y.o. female with PMH significant for tobacco use presenting with left knee pain.  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  She had immediate pain and difficulty bearing weight.  She was brought to [facility] ED where x-rays revealed a midpole patellar fracture.  She was transferred to [facility] [place] care.  Patient denies any head trauma or LOC. The patient denies prior hx of falls. Patient denies numbness and tingling. Denies any other musculoskeletal pain or injuries. No known history of prior left lower extremity injury or surgery. Walks w/out assisted devices at baseline. Doesn't take any anticoagulation at baseline.  Patient lives at home alone with no help available.    They deny IV drug use.  They have a 30 pack-year smoking history   They deny alcohol use.   They deny immunosuppressant medications.  They deny chemotherapy.  They deny radiation therapy.

## 2024-07-07 NOTE — SUBJECTIVE & OBJECTIVE
Past Medical History:   Diagnosis Date    Known health problems: none        Past Surgical History:   Procedure Laterality Date    APPENDECTOMY       SECTION      HYSTEROSCOPY WITH DILATION AND CURETTAGE OF UTERUS N/A 8/15/2018    Procedure: HYSTEROSCOPY, WITH DILATION AND CURETTAGE OF UTERUS;  Surgeon: Elder Salazar MD;  Location: [facility] OR;  Service: OB/GYN;  Laterality: N/A;    THERMAL ABLATION OF ENDOMETRIUM N/A 8/15/2018    Procedure: ABLATION, ENDOMETRIUM, THERMAL;  Surgeon: Elder Salazar MD;  Location: [facility] OR;  Service: OB/GYN;  Laterality: N/A;    TUBAL LIGATION         Review of patient's allergies indicates:  No Known Allergies    Current Facility-Administered Medications   Medication    acetaminophen tablet 650 mg    HYDROcodone-acetaminophen 5-325 mg per tablet 1 tablet    morphine injection 2 mg     Family History       Problem Relation (Age of Onset)    Breast cancer Maternal Aunt, Maternal Cousin    COPD Mother    Diabetes Mother    Hypertension Mother, Father          Tobacco Use    Smoking status: Every Day     Current packs/day: 0.25     Average packs/day: 0.3 packs/day for 30.0 years (7.5 ttl pk-yrs)     Types: Cigarettes    Smokeless tobacco: Never   Substance and Sexual Activity    Alcohol use: No    Drug use: No    Sexual activity: Yes     ROS  Constitutional: negative for fevers  Eyes: negative visual changes  ENT: negative for hearing loss  Respiratory: negative for dyspnea  Cardiovascular: negative for chest pain  Gastrointestinal: negative for abdominal pain  Genitourinary: negative for dysuria  Neurological: negative for headaches  Behavioral/Psych: negative for hallucinations  Endocrine: negative for temperature intolerance    Objective:     Vital Signs (Most Recent):  Temp: 99.5 °F (37.5 °C) (245)  Pulse: 72 (24 0133)  Resp: 18 (24 0159)  BP: 126/62 (24 0133)  SpO2: 98 % (24) Vital Signs (24h Range):  Temp:  [97.3 °F (36.3  °C)-99.5 °F (37.5 °C)] 99.5 °F (37.5 °C)  Pulse:  [69-86] 72  Resp:  [12-20] 18  SpO2:  [96 %-100 %] 98 %  BP: (111-158)/() 126/62     Weight: 72.6 kg (160 lb)     Body mass index is 26.63 kg/m².    No intake or output data in the 24 hours ending 07/07/24 0357     Ortho/SPM Exam  General:  no acute distress, appears stated age   Neuro: alert and oriented x3  Psych: normal mood  Head: normocephalic, atraumatic.  Eyes: no scleral icterus  Mouth: moist mucous membranes  Cardiovascular: extremities warm and well perfused  Lungs: breathing comfortably, equal chest rise bilat  Skin: clean, dry, intact (any exceptions noted in below musculoskeletal exam)    MSK:  RUE:  - Skin intact throughout, no open wounds  - No swelling  - No ecchymosis, erythema, or signs of cellulitis  - NonTTP throughout  - AROM and PROM of the shoulder, elbow, wrist, and hand intact without pain  - Axillary/AIN/PIN/Radial/Median/Ulnar Nerves assessed in isolation without deficit  - SILT throughout  - Compartments soft  - Radial artery palpated   - Capillary Refill <3s    LUE:  - Skin intact throughout, no open wounds  - No swelling  - No ecchymosis, erythema, or signs of cellulitis  - NonTTP throughout  - AROM and PROM of the shoulder, elbow, wrist, and hand intact without pain  - Axillary/AIN/PIN/Radial/Median/Ulnar Nerves assessed in isolation without deficit  - SILT throughout  - Compartments soft  - Radial artery palpated   - Capillary Refill <3s    RLE:  - Skin intact throughout, no open wounds  - No swelling  - No ecchymosis, erythema, or signs of cellulitis  - NonTTP throughout  - AROM and PROM of the hip, knee, ankle, and foot intact without pain  - TA/EHL/Gastroc/FHL assessed in isolation without deficit  - SILT throughout  - Compartments soft  - DP and PT palpated  - Capillary Refill <3s  - Negative Log [facility]  [facility]    LLE:  - Skin intact throughout, no open wounds  - significant swelling to anterior knee with  surrounding ecchymosis and erythema  - TTP over anterior knee  - AROM and PROM of the hip, ankle, and foot intact without pain  - pain with any range of motion of knee  - loss of extensor mechanism  - TA/EHL/Gastroc/FHL assessed in isolation without deficit  - SILT throughout  - Compartments soft  - DP and PT palpated  - Capillary Refill <3s  - Negative Log roll  - Negative [facility]field    Spine/pelvis/axial body:  No pain with compression of pelvis  No chest wall or abdominal tenderness         Significant Labs: CBC:   Recent Labs   Lab 07/06/24  1026 07/06/24  1941   WBC 11.84 15.65*   HGB 14.9 15.8   HCT 45.0 48.0    256     CMP:   Recent Labs   Lab 07/06/24 1026 07/06/24 1941    139   K 5.0 3.9    107   CO2 21* 21*   * 132*   BUN 11 12   CREATININE 0.8 0.8   CALCIUM 9.6 10.1   PROT 7.2 7.6   ALBUMIN 3.9 4.2   BILITOT 0.4 0.7   ALKPHOS 75 83   AST 16 16   ALT 19 19   ANIONGAP 10 11       Prealbumin:   Recent Labs   Lab 07/06/24  0018   PREALBUMIN 27     All pertinent labs within the past 24 hours have been reviewed.    Significant Imaging: I have reviewed and interpreted all pertinent imaging results/findings.  XR left knee:  Displaced midpole patellar fracture  CT left knee:  Comminuted and displaced midpole patellar fracture

## 2024-07-07 NOTE — HPI
49 [place] F who [place] [facility] [place] L comminuted patellar fracture after a ground level fall for ortho evaluation. She is neurovascularly intact and otherwise w/o any other issues. Ortho evaluated her in the ED and recommended admission for PT and likely ORIF after her swelling goes down within the next few days. She c/o pain and swelling  at the L knee  . She denies fever, CP, SOB, N/V/D

## 2024-07-07 NOTE — ED NOTES
Nurses Note -- 4 Eyes      7/7/2024   1:13 AM      Skin assessed during: Q Shift Change      [] No Altered Skin Integrity Present    []Prevention Measures Documented      [x] Yes- Altered Skin Integrity Present or Discovered   [x] LDA Added if Not in Epic (Describe Wound)   [x] New Altered Skin Integrity was Present on Admit and Documented in LDA   [x] Wound Image Taken    Wound Care Consulted? No    Attending Nurse:  Ksenia Orona RN/Staff Member:   Belle

## 2024-07-08 ENCOUNTER — TELEPHONE (OUTPATIENT)
Dept: ORTHOPEDICS | Facility: CLINIC | Age: 49
End: 2024-07-08
Payer: COMMERCIAL

## 2024-07-08 ENCOUNTER — ANESTHESIA (OUTPATIENT)
Dept: SURGERY | Facility: HOSPITAL | Age: 49
End: 2024-07-08
Payer: COMMERCIAL

## 2024-07-08 PROBLEM — Z72.0 NICOTINE USE: Status: ACTIVE | Noted: 2024-07-08

## 2024-07-08 PROCEDURE — 63600175 PHARM REV CODE 636 W HCPCS

## 2024-07-08 PROCEDURE — 25000003 PHARM REV CODE 250

## 2024-07-08 PROCEDURE — 25000003 PHARM REV CODE 250: Performed by: NURSE PRACTITIONER

## 2024-07-08 PROCEDURE — 96376 TX/PRO/DX INJ SAME DRUG ADON: CPT

## 2024-07-08 PROCEDURE — 63600175 PHARM REV CODE 636 W HCPCS: Performed by: ORTHOPAEDIC SURGERY

## 2024-07-08 PROCEDURE — 99900035 HC TECH TIME PER 15 MIN (STAT)

## 2024-07-08 PROCEDURE — 71000033 HC RECOVERY, INTIAL HOUR: Performed by: ORTHOPAEDIC SURGERY

## 2024-07-08 PROCEDURE — 64448 NJX AA&/STRD FEM NRV NFS IMG: CPT

## 2024-07-08 PROCEDURE — 63600175 PHARM REV CODE 636 W HCPCS: Performed by: NURSE ANESTHETIST, CERTIFIED REGISTERED

## 2024-07-08 PROCEDURE — 25000003 PHARM REV CODE 250: Performed by: HOSPITALIST

## 2024-07-08 PROCEDURE — C1713 ANCHOR/SCREW BN/BN,TIS/BN: HCPCS | Performed by: ORTHOPAEDIC SURGERY

## 2024-07-08 PROCEDURE — 11000001 HC ACUTE MED/SURG PRIVATE ROOM

## 2024-07-08 PROCEDURE — 63600175 PHARM REV CODE 636 W HCPCS: Performed by: STUDENT IN AN ORGANIZED HEALTH CARE EDUCATION/TRAINING PROGRAM

## 2024-07-08 PROCEDURE — 94761 N-INVAS EAR/PLS OXIMETRY MLT: CPT

## 2024-07-08 PROCEDURE — 71000015 HC POSTOP RECOV 1ST HR: Performed by: ORTHOPAEDIC SURGERY

## 2024-07-08 PROCEDURE — 37000009 HC ANESTHESIA EA ADD 15 MINS: Performed by: ORTHOPAEDIC SURGERY

## 2024-07-08 PROCEDURE — 36000710: Performed by: ORTHOPAEDIC SURGERY

## 2024-07-08 PROCEDURE — 63600175 PHARM REV CODE 636 W HCPCS: Mod: JZ,JG | Performed by: ANESTHESIOLOGY

## 2024-07-08 PROCEDURE — A4216 STERILE WATER/SALINE, 10 ML: HCPCS | Performed by: HOSPITALIST

## 2024-07-08 PROCEDURE — 0QSF04Z REPOSITION LEFT PATELLA WITH INTERNAL FIXATION DEVICE, OPEN APPROACH: ICD-10-PCS | Performed by: ORTHOPAEDIC SURGERY

## 2024-07-08 PROCEDURE — 27524 TREAT KNEECAP FRACTURE: CPT | Mod: LT,,, | Performed by: ORTHOPAEDIC SURGERY

## 2024-07-08 PROCEDURE — 25000003 PHARM REV CODE 250: Performed by: NURSE ANESTHETIST, CERTIFIED REGISTERED

## 2024-07-08 PROCEDURE — 36000711: Performed by: ORTHOPAEDIC SURGERY

## 2024-07-08 PROCEDURE — 37000008 HC ANESTHESIA 1ST 15 MINUTES: Performed by: ORTHOPAEDIC SURGERY

## 2024-07-08 PROCEDURE — 27000221 HC OXYGEN, UP TO 24 HOURS

## 2024-07-08 PROCEDURE — 27201423 OPTIME MED/SURG SUP & DEVICES STERILE SUPPLY: Performed by: ORTHOPAEDIC SURGERY

## 2024-07-08 PROCEDURE — 63600175 PHARM REV CODE 636 W HCPCS: Performed by: NURSE PRACTITIONER

## 2024-07-08 RX ORDER — ASPIRIN 81 MG/1
81 TABLET ORAL 2 TIMES DAILY
Status: DISCONTINUED | OUTPATIENT
Start: 2024-07-08 | End: 2024-07-10 | Stop reason: HOSPADM

## 2024-07-08 RX ORDER — SODIUM CHLORIDE 0.9 % (FLUSH) 0.9 %
10 SYRINGE (ML) INJECTION
Status: DISCONTINUED | OUTPATIENT
Start: 2024-07-08 | End: 2024-07-08 | Stop reason: HOSPADM

## 2024-07-08 RX ORDER — ACETAMINOPHEN 500 MG
1000 TABLET ORAL EVERY 6 HOURS
Status: DISCONTINUED | OUTPATIENT
Start: 2024-07-08 | End: 2024-07-10

## 2024-07-08 RX ORDER — LIDOCAINE HYDROCHLORIDE 20 MG/ML
INJECTION, SOLUTION EPIDURAL; INFILTRATION; INTRACAUDAL; PERINEURAL
Status: DISCONTINUED | OUTPATIENT
Start: 2024-07-08 | End: 2024-07-08

## 2024-07-08 RX ORDER — ROPIVACAINE HYDROCHLORIDE 2 MG/ML
INJECTION, SOLUTION EPIDURAL; INFILTRATION; PERINEURAL CONTINUOUS
Status: DISCONTINUED | OUTPATIENT
Start: 2024-07-08 | End: 2024-07-10 | Stop reason: HOSPADM

## 2024-07-08 RX ORDER — GLUCAGON 1 MG
1 KIT INJECTION
Status: DISCONTINUED | OUTPATIENT
Start: 2024-07-08 | End: 2024-07-10 | Stop reason: HOSPADM

## 2024-07-08 RX ORDER — DEXAMETHASONE SODIUM PHOSPHATE 4 MG/ML
INJECTION, SOLUTION INTRA-ARTICULAR; INTRALESIONAL; INTRAMUSCULAR; INTRAVENOUS; SOFT TISSUE
Status: DISCONTINUED | OUTPATIENT
Start: 2024-07-08 | End: 2024-07-08

## 2024-07-08 RX ORDER — MIDAZOLAM HYDROCHLORIDE 1 MG/ML
INJECTION, SOLUTION INTRAMUSCULAR; INTRAVENOUS
Status: DISCONTINUED | OUTPATIENT
Start: 2024-07-08 | End: 2024-07-08

## 2024-07-08 RX ORDER — ONDANSETRON HYDROCHLORIDE 2 MG/ML
INJECTION, SOLUTION INTRAVENOUS
Status: DISCONTINUED | OUTPATIENT
Start: 2024-07-08 | End: 2024-07-08

## 2024-07-08 RX ORDER — CELECOXIB 200 MG/1
200 CAPSULE ORAL DAILY
Status: DISCONTINUED | OUTPATIENT
Start: 2024-07-08 | End: 2024-07-10 | Stop reason: HOSPADM

## 2024-07-08 RX ORDER — PROPOFOL 10 MG/ML
VIAL (ML) INTRAVENOUS
Status: DISCONTINUED | OUTPATIENT
Start: 2024-07-08 | End: 2024-07-08

## 2024-07-08 RX ORDER — MUPIROCIN 20 MG/G
OINTMENT TOPICAL
Status: DISCONTINUED | OUTPATIENT
Start: 2024-07-08 | End: 2024-07-08 | Stop reason: HOSPADM

## 2024-07-08 RX ORDER — VANCOMYCIN HYDROCHLORIDE 1 G/20ML
INJECTION, POWDER, LYOPHILIZED, FOR SOLUTION INTRAVENOUS
Status: DISCONTINUED | OUTPATIENT
Start: 2024-07-08 | End: 2024-07-08 | Stop reason: HOSPADM

## 2024-07-08 RX ORDER — FENTANYL CITRATE 50 UG/ML
INJECTION, SOLUTION INTRAMUSCULAR; INTRAVENOUS
Status: DISCONTINUED | OUTPATIENT
Start: 2024-07-08 | End: 2024-07-08

## 2024-07-08 RX ORDER — OXYCODONE HYDROCHLORIDE 5 MG/1
5 TABLET ORAL EVERY 4 HOURS PRN
Status: DISCONTINUED | OUTPATIENT
Start: 2024-07-08 | End: 2024-07-10 | Stop reason: HOSPADM

## 2024-07-08 RX ORDER — METHOCARBAMOL 500 MG/1
500 TABLET, FILM COATED ORAL EVERY 6 HOURS
Status: DISCONTINUED | OUTPATIENT
Start: 2024-07-08 | End: 2024-07-10

## 2024-07-08 RX ORDER — HYDROMORPHONE HYDROCHLORIDE 1 MG/ML
0.2 INJECTION, SOLUTION INTRAMUSCULAR; INTRAVENOUS; SUBCUTANEOUS EVERY 5 MIN PRN
Status: DISCONTINUED | OUTPATIENT
Start: 2024-07-08 | End: 2024-07-08 | Stop reason: HOSPADM

## 2024-07-08 RX ORDER — BUPIVACAINE HYDROCHLORIDE 2.5 MG/ML
INJECTION, SOLUTION EPIDURAL; INFILTRATION; INTRACAUDAL
Status: COMPLETED | OUTPATIENT
Start: 2024-07-08 | End: 2024-07-08

## 2024-07-08 RX ORDER — ROCURONIUM BROMIDE 10 MG/ML
INJECTION, SOLUTION INTRAVENOUS
Status: DISCONTINUED | OUTPATIENT
Start: 2024-07-08 | End: 2024-07-08

## 2024-07-08 RX ORDER — PHENYLEPHRINE HCL IN 0.9% NACL 1 MG/10 ML
SYRINGE (ML) INTRAVENOUS
Status: DISCONTINUED | OUTPATIENT
Start: 2024-07-08 | End: 2024-07-08

## 2024-07-08 RX ADMIN — DEXAMETHASONE SODIUM PHOSPHATE 4 MG: 4 INJECTION, SOLUTION INTRAMUSCULAR; INTRAVENOUS at 08:07

## 2024-07-08 RX ADMIN — OXYCODONE 5 MG: 5 TABLET ORAL at 01:07

## 2024-07-08 RX ADMIN — HYDROMORPHONE HYDROCHLORIDE 0.2 MG: 1 INJECTION, SOLUTION INTRAMUSCULAR; INTRAVENOUS; SUBCUTANEOUS at 11:07

## 2024-07-08 RX ADMIN — CELECOXIB 200 MG: 200 CAPSULE ORAL at 11:07

## 2024-07-08 RX ADMIN — Medication 100 MCG: at 08:07

## 2024-07-08 RX ADMIN — ONDANSETRON 4 MG: 2 INJECTION INTRAMUSCULAR; INTRAVENOUS at 10:07

## 2024-07-08 RX ADMIN — ROCURONIUM BROMIDE 20 MG: 10 INJECTION, SOLUTION INTRAVENOUS at 08:07

## 2024-07-08 RX ADMIN — LIDOCAINE HYDROCHLORIDE 100 MG: 20 INJECTION, SOLUTION EPIDURAL; INFILTRATION; INTRACAUDAL; PERINEURAL at 07:07

## 2024-07-08 RX ADMIN — BUPIVACAINE HYDROCHLORIDE 15 ML: 2.5 INJECTION, SOLUTION EPIDURAL; INFILTRATION; INTRACAUDAL; PERINEURAL at 07:07

## 2024-07-08 RX ADMIN — MIDAZOLAM HYDROCHLORIDE 2 MG: 2 INJECTION, SOLUTION INTRAMUSCULAR; INTRAVENOUS at 07:07

## 2024-07-08 RX ADMIN — Medication 10 ML: at 02:07

## 2024-07-08 RX ADMIN — ACETAMINOPHEN 1000 MG: 500 TABLET ORAL at 11:07

## 2024-07-08 RX ADMIN — PROPOFOL 150 MG: 10 INJECTION, EMULSION INTRAVENOUS at 07:07

## 2024-07-08 RX ADMIN — ASPIRIN 81 MG: 81 TABLET, COATED ORAL at 08:07

## 2024-07-08 RX ADMIN — SODIUM CHLORIDE: 0.9 INJECTION, SOLUTION INTRAVENOUS at 07:07

## 2024-07-08 RX ADMIN — Medication 100 MCG: at 07:07

## 2024-07-08 RX ADMIN — SODIUM CHLORIDE: 0.9 INJECTION, SOLUTION INTRAVENOUS at 09:07

## 2024-07-08 RX ADMIN — Medication 10 ML: at 11:07

## 2024-07-08 RX ADMIN — MUPIROCIN: 20 OINTMENT TOPICAL at 06:07

## 2024-07-08 RX ADMIN — SUGAMMADEX 200 MG: 100 INJECTION, SOLUTION INTRAVENOUS at 10:07

## 2024-07-08 RX ADMIN — OXYCODONE 5 MG: 5 TABLET ORAL at 05:07

## 2024-07-08 RX ADMIN — SENNOSIDES AND DOCUSATE SODIUM 1 TABLET: 50; 8.6 TABLET ORAL at 08:07

## 2024-07-08 RX ADMIN — FENTANYL CITRATE 50 MCG: 50 INJECTION, SOLUTION INTRAMUSCULAR; INTRAVENOUS at 10:07

## 2024-07-08 RX ADMIN — METHOCARBAMOL 500 MG: 500 TABLET ORAL at 11:07

## 2024-07-08 RX ADMIN — CEFAZOLIN 2 G: 2 INJECTION, POWDER, FOR SOLUTION INTRAMUSCULAR; INTRAVENOUS at 08:07

## 2024-07-08 RX ADMIN — ACETAMINOPHEN 650 MG: 325 TABLET ORAL at 12:07

## 2024-07-08 RX ADMIN — FENTANYL CITRATE 50 MCG: 50 INJECTION, SOLUTION INTRAMUSCULAR; INTRAVENOUS at 07:07

## 2024-07-08 RX ADMIN — FENTANYL CITRATE 50 MCG: 50 INJECTION, SOLUTION INTRAMUSCULAR; INTRAVENOUS at 09:07

## 2024-07-08 RX ADMIN — POLYETHYLENE GLYCOL 3350 17 G: 17 POWDER, FOR SOLUTION ORAL at 11:07

## 2024-07-08 RX ADMIN — HYDROMORPHONE HYDROCHLORIDE 0.5 MG: 1 INJECTION, SOLUTION INTRAMUSCULAR; INTRAVENOUS; SUBCUTANEOUS at 03:07

## 2024-07-08 RX ADMIN — ROCURONIUM BROMIDE 50 MG: 10 INJECTION, SOLUTION INTRAVENOUS at 07:07

## 2024-07-08 RX ADMIN — Medication: at 11:07

## 2024-07-08 RX ADMIN — ACETAMINOPHEN 650 MG: 325 TABLET ORAL at 05:07

## 2024-07-08 RX ADMIN — Medication 10 ML: at 05:07

## 2024-07-08 RX ADMIN — OXYCODONE HYDROCHLORIDE 10 MG: 10 TABLET ORAL at 12:07

## 2024-07-08 NOTE — PROGRESS NOTES
Maximino Arvizu - Surgery ([facility])  [facility] Medicine  Progress Note    Patient Name: Catalina Abrams  MRN: [id number]  Patient Class: OP- Observation   Admission Date: 7/6/2024  Length of Stay: 0 days  Attending Physician: Jennifer Chilel MD  Primary Care Provider: Vicky Antonio NP        Subjective:     Principal Problem:Closed fracture of left patella        HPI:  49 yo F who [place] [facility] [place] for L comminuted patellar fracture after a ground level fall for ortho evaluation. She is neurovascularly intact and otherwise w/o any other issues. Ortho evaluated her in the ED and recommended admission for PT and likely ORIF after her swelling goes down within the next few days. She c/o pain and swelling  at the L knee  . She denies fever, CP, SOB, N/V/D     Overview/Hospital Course:  No notes on file    Interval history- seen in pacu with nursing at bedside, she reports that patient just got dilaudid which may be reason she is a litle sleepy at times. She reports last night her pain revved up a good bit and night team reported that they did adjust jonas meds overnight due to pain and now has ropivicaine post op and anesthesia assisting after went for ORIF of left patella fracture today surprisingly as previous ortho notes stated was going to f/u outpatient and had a case request 7/17 so luckily I didn't discharge her yesterday to awtch her pain levels as I didn't know that plans had changed for OR today until this morning. Nurse Lyly reports that staff saw her in early evening and changed plans. Plan for sa bid x 8 weeks and wbat in hinged knee brace with HKB locked in extension on exam as well as ace bandages throughout knee. She reports heaviness sensation in knee now. Plan for progressive mobility in op note in coming weeks in place. CM notified that angie need RW on discharge and likely montioring for pain control for 1-2 days given was uncontrolled overnight and this AM with good bit of pain so  far.    Review of patient's allergies indicates:  No Known Allergies    No current facility-administered medications on file prior to encounter.     Current Outpatient Medications on File Prior to Encounter   Medication Sig    omeprazole (PRILOSEC) 20 MG capsule Take 1 capsule (20 mg total) by mouth once daily.     Family History       Problem Relation (Age of Onset)    Breast cancer Maternal Aunt, Maternal Cousin    COPD Mother    Diabetes Mother    Hypertension Mother, Father          Tobacco Use    Smoking status: Every Day     Current packs/day: 0.25     Average packs/day: 0.3 packs/day for 30.0 years (7.5 ttl pk-yrs)     Types: Cigarettes    Smokeless tobacco: Never   Substance and Sexual Activity    Alcohol use: No    Drug use: No    Sexual activity: Yes     Review of Systems   Constitutional:  Negative for appetite change.   Respiratory:  Negative for cough and shortness of breath.    Cardiovascular:  Negative for chest pain and leg swelling.   Gastrointestinal:  Negative for abdominal distention, abdominal pain, constipation and diarrhea.   Genitourinary:  Negative for difficulty urinating and dysuria.   Musculoskeletal:  Positive for arthralgias and joint swelling.   Neurological:  Negative for dizziness and headaches.     Objective:     Vital Signs (Most Recent):  Temp: 98.2 °F (36.8 °C) (07/08/24 1044)  Pulse: 94 (07/08/24 1145)  Resp: 14 (07/08/24 1145)  BP: 132/66 (07/08/24 1145)  SpO2: (!) 93 % (07/08/24 1145) Vital Signs (24h Range):  Temp:  [96.6 °F (35.9 °C)-98.5 °F (36.9 °C)] 98.2 °F (36.8 °C)  Pulse:  [] 94  Resp:  [14-20] 14  SpO2:  [93 %-100 %] 93 %  BP: (106-144)/(55-73) 132/66     Weight: 72.6 kg (160 lb)  Body mass index is 26.63 kg/m².     Physical Exam  Constitutional:       Appearance: Normal appearance.   HENT:      Head: Normocephalic and atraumatic.      Mouth/Throat:      Mouth: Mucous membranes are moist.   Cardiovascular:      Rate and Rhythm: Normal rate and regular rhythm.       Pulses: Normal pulses.      Heart sounds: Normal heart sounds.   Pulmonary:      Effort: Pulmonary effort is normal.      Breath sounds: Normal breath sounds. No rales.   Abdominal:      General: Abdomen is flat. Bowel sounds are normal. There is no distension.      Palpations: Abdomen is soft.      Tenderness: There is no abdominal tenderness.   Musculoskeletal:         General: Normal range of motion.      Cervical back: Normal range of motion and neck supple.      Comments: Hinged knee brace locked in extension to LLE with ace bandage throughout knee. Ropivicaine in place.   Skin:     General: Skin is warm and dry.   Neurological:      General: No focal deficit present.      Mental Status: She is alert and oriented to person, place, and time.   Psychiatric:         Mood and Affect: Mood normal.                Significant Labs: All pertinent labs within the past 24 hours have been reviewed.    Significant Imaging: I have reviewed all pertinent imaging results/findings within the past 24 hours.    Assessment/Plan:      * Closed fracture of left patella  - s/p mechanical fall at [employer] she works at due to standing water with pain and difficulty walking. Transferred here for ortho from [facility] due to patela fx with sever comminution seen on imaging  -initially ortho reported plan for outpatient surgery repair but ended up taking to OR 7/8 AM for ORIF patella. Plan for ASA BID x 8 weeks- end date September 3rd, wbat in hinged knee brace locked in extension  -pain control not controlled pre op 7/8 overnight with adjustments made and now anesthesia managing post op with ropivicaine and multimodals in place. Will adjust discharge med rec once closer to discharge as previously med-rec-ed with norco before adjustments and plan to discharge with different regimen now  -progerssive mobility plan with hinged knee brace per ortho op note (See image copied and pasted into op note for details by  week)        Nicotine use  She reports no issues with withdrawal and declined nicotine patch  Counselled on cessation        VTE Risk Mitigation (From admission, onward)           Ordered     IP VTE LOW RISK PATIENT  Once         07/07/24 0703     Place sequential compression device  Until discontinued         07/07/24 0703                    Discharge Planning   KATHLEEN: 7/9/2024     Code Status: Full Code   Is the patient medically ready for discharge?:     Reason for patient still in hospital (select all that apply): Patient trending condition                     Jennifer Chilel MD  Department of Hospital Medicine   [facility] - Surgery ([facility])

## 2024-07-08 NOTE — SUBJECTIVE & OBJECTIVE
Interval history- seen in pacu with nursing at bedside, she reports that patient just got dilaudid which may be reason she is a litle sleepy at times. She reports last night her pain revved up a good bit and night team reported that they did adjust jonas meds overnight due to pain and now has ropivicaine post op and anesthesia assisting after went for ORIF of left patella fracture today surprisingly as previous ortho notes stated was going to f/u outpatient and had a case request 7/17 so luckily I didn't discharge her yesterday to awtch her pain levels as I didn't know that plans had changed for OR today until this morning. Nurse Lyly reports that staff saw her in early evening and changed plans. Plan for sa bid x 8 weeks and wbat in hinged knee brace with HKB locked in extension on exam as well as ace bandages throughout knee. She reports heaviness sensation in knee now. Plan for progressive mobility in op note in coming weeks in place. CM notified that angie need RW on discharge and likely montioring for pain control for 1-2 days given was uncontrolled overnight and this AM with good bit of pain so far.    Review of patient's allergies indicates:  No Known Allergies    No current facility-administered medications on file prior to encounter.     Current Outpatient Medications on File Prior to Encounter   Medication Sig    omeprazole (PRILOSEC) 20 MG capsule Take 1 capsule (20 mg total) by mouth once daily.     Family History       Problem Relation (Age of Onset)    Breast cancer Maternal Aunt, Maternal Cousin    COPD Mother    Diabetes Mother    Hypertension Mother, Father          Tobacco Use    Smoking status: Every Day     Current packs/day: 0.25     Average packs/day: 0.3 packs/day for 30.0 years (7.5 ttl pk-yrs)     Types: Cigarettes    Smokeless tobacco: Never   Substance and Sexual Activity    Alcohol use: No    Drug use: No    Sexual activity: Yes     Review of Systems   Constitutional:  Negative for  appetite change.   Respiratory:  Negative for cough and shortness of breath.    Cardiovascular:  Negative for chest pain and leg swelling.   Gastrointestinal:  Negative for abdominal distention, abdominal pain, constipation and diarrhea.   Genitourinary:  Negative for difficulty urinating and dysuria.   Musculoskeletal:  Positive for arthralgias and joint swelling.   Neurological:  Negative for dizziness and headaches.     Objective:     Vital Signs (Most Recent):  Temp: 98.2 °F (36.8 °C) (07/08/24 1044)  Pulse: 94 (07/08/24 1145)  Resp: 14 (07/08/24 1145)  BP: 132/66 (07/08/24 1145)  SpO2: (!) 93 % (07/08/24 1145) Vital Signs (24h Range):  Temp:  [96.6 °F (35.9 °C)-98.5 °F (36.9 °C)] 98.2 °F (36.8 °C)  Pulse:  [] 94  Resp:  [14-20] 14  SpO2:  [93 %-100 %] 93 %  BP: (106-144)/(55-73) 132/66     Weight: 72.6 kg (160 lb)  Body mass index is 26.63 kg/m².     Physical Exam  Constitutional:       Appearance: Normal appearance.   HENT:      Head: Normocephalic and atraumatic.      Mouth/Throat:      Mouth: Mucous membranes are moist.   Cardiovascular:      Rate and Rhythm: Normal rate and regular rhythm.      Pulses: Normal pulses.      Heart sounds: Normal heart sounds.   Pulmonary:      Effort: Pulmonary effort is normal.      Breath sounds: Normal breath sounds. No rales.   Abdominal:      General: Abdomen is flat. Bowel sounds are normal. There is no distension.      Palpations: Abdomen is soft.      Tenderness: There is no abdominal tenderness.   Musculoskeletal:         General: Normal range of motion.      Cervical back: Normal range of motion and neck supple.      Comments: Hinged knee brace locked in extension to LLE with ace bandage throughout knee. Ropivicaine in place.   Skin:     General: Skin is warm and dry.   Neurological:      General: No focal deficit present.      Mental Status: She is alert and oriented to person, place, and time.   Psychiatric:         Mood and Affect: Mood normal.                 Significant Labs: All pertinent labs within the past 24 hours have been reviewed.    Significant Imaging: I have reviewed all pertinent imaging results/findings within the past 24 hours.

## 2024-07-08 NOTE — PLAN OF CARE
Patient in OR at this time. Unable to complete d/c planning assessment. Will attempt again later this afternoon.    Connie Rivera RNCM  Case Management  [facility]  [phone]

## 2024-07-08 NOTE — PLAN OF CARE
Pre-op checklist complete. Pending H&P update and surgical consent. Anesthesia consent signed & witnessed. Vitals stable, no distress noted. PIV flushed with no difficulties. Pt had an ablation in 2018, states she has not had a menstrual cycle since then. Daughter and boyfriend signed up for text message updates, no family at the bedside at the moment.

## 2024-07-08 NOTE — PLAN OF CARE
Problem: Adult Inpatient Plan of Care  Goal: Plan of Care Review  Outcome: Progressing     Problem: Adult Inpatient Plan of Care  Goal: Patient-Specific Goal (Individualized)  Outcome: Progressing     Problem: Adult Inpatient Plan of Care  Goal: Optimal Comfort and Wellbeing  Outcome: Progressing     Problem: Wound  Goal: Skin Health and Integrity  Outcome: Progressing     Problem: Pain Acute  Goal: Optimal Pain Control and Function  Outcome: Progressing

## 2024-07-08 NOTE — ANESTHESIA POSTPROCEDURE EVALUATION
Anesthesia Post Evaluation    Patient: Catalina Abrams    Procedure(s) Performed: Procedure(s) (LRB):  ORIF, FRACTURE, PATELLA - LEFT (Left)    Final Anesthesia Type: general      Patient location during evaluation: PACU  Patient participation: Yes- Able to Participate  Level of consciousness: awake and alert  Post-procedure vital signs: reviewed and stable  Pain management: adequate  Airway patency: patent    PONV status at discharge: No PONV  Anesthetic complications: no      Cardiovascular status: blood pressure returned to baseline and hemodynamically stable  Respiratory status: spontaneous ventilation  Hydration status: euvolemic  Follow-up not needed.              Vitals Value Taken Time   /84 07/08/24 1239   Temp 36.9 °C (98.4 °F) 07/08/24 1239   Pulse 98 07/08/24 1239   Resp 16 07/08/24 1239   SpO2 95 % 07/08/24 1239         Event Time   Out of Recovery 07/08/2024 11:30:00         Pain/Emil Score: Pain Rating Prior to Med Admin: 6 (7/8/2024 11:40 AM)  Pain Rating Post Med Admin: 3 (7/7/2024  5:13 AM)  Emil Score: 10 (7/8/2024 11:30 AM)

## 2024-07-08 NOTE — TELEPHONE ENCOUNTER
----- Message from YENNIFER Mercado MD sent at 7/7/2024  7:52 AM CDT -----  Hey this patient will need an appointment for the 15th for a skin check. Sustained a left patellar fracture. Booking her for the 17th for Dr. Mullins. Consent in trauma clinic folder. Thank you!

## 2024-07-08 NOTE — PT/OT/SLP PROGRESS
Physical Therapy Discharge      Patient Name:  Catalina Abrams   MRN:  [id number]    Patient not seen today secondary to Off the floor for procedure/surgery (ORIF of Left [place]) Current PT orders discharged, will need new PT orders with updated WB status to perform Re-evaluation. Will follow-up when appropriate.    7/8/2024

## 2024-07-08 NOTE — PLAN OF CARE
Problem: Adult Inpatient Plan of Care  Goal: Plan of Care Review  7/7/2024 1928 by Llyy Dupree RN  Outcome: Progressing  7/7/2024 1755 by Lyly Dupree RN  Outcome: Progressing  Goal: Patient-Specific Goal (Individualized)  7/7/2024 1928 by Lyly Dupree RN  Outcome: Progressing  7/7/2024 1755 by Lyly Dupree RN  Outcome: Progressing  Goal: Absence of Hospital-Acquired Illness or Injury  7/7/2024 1928 by Lyly Dupree RN  Outcome: Progressing  7/7/2024 1755 by Lyly Dupree RN  Outcome: Progressing  Goal: Optimal Comfort and Wellbeing  7/7/2024 1928 by Lyly Dupree RN  Outcome: Progressing  7/7/2024 1755 by Lyly Dupree RN  Outcome: Progressing  Goal: Readiness for Transition of Care  7/7/2024 1928 by Lyly Dupree RN  Outcome: Progressing  7/7/2024 1755 by Lyly Dupree RN  Outcome: Progressing     Problem: Wound  Goal: Optimal Coping  7/7/2024 1928 by Lyly Dupree RN  Outcome: Progressing  7/7/2024 1755 by Lyly Dupree RN  Outcome: Progressing  Goal: Optimal Functional Ability  7/7/2024 1928 by Lyly Dupree RN  Outcome: Progressing  7/7/2024 1755 by Lyly Dupree RN  Outcome: Progressing  Goal: Absence of Infection Signs and Symptoms  7/7/2024 1928 by Lyly Dupree RN  Outcome: Progressing  7/7/2024 1755 by Lyly Dupree RN  Outcome: Progressing  Goal: Improved Oral Intake  7/7/2024 1928 by Lyly Dupree, RN  Outcome: Progressing  7/7/2024 1755 by Lyly Dupree RN  Outcome: Progressing  Goal: Optimal Pain Control and Function  7/7/2024 1928 by Lyly Dupree RN  Outcome: Progressing  7/7/2024 1755 by Lyly Dupree, RN  Outcome: Progressing  Goal: Skin Health and Integrity  7/7/2024 1928 by Lyly Dupree RN  Outcome: Progressing  7/7/2024 1755 by Lyly Dupree, RN  Outcome: Progressing  Goal: Optimal Wound Healing  7/7/2024 1928 by Lyly Dupree, RN  Outcome: Progressing  7/7/2024 1755 by Joon,  JOSE Desouza  Outcome: Progressing    Pt alert and calm. Pain adequately managed with meds and ice. Pt expressed desire to have surgery asap. Skin intact. Transfer with 1 person assist.

## 2024-07-08 NOTE — NURSING TRANSFER
Nursing Transfer Note      Reason patient is being transferred: PACU 05    Transfer To: [facility]    Transfer via bed    Transfer with None    Transported by Patient Transport    Medicines sent: Perineural Pump with Ropivacaine infusing    Any special needs or follow-up needed: Routine Care    Chart send with patient: Yes    Notified: daughter, father    Patient reassessed at: 7/8/2024 1153 (date, time)

## 2024-07-08 NOTE — OP NOTE
OPERATIVE NOTE    DATE OF PROCEDURE:  07/08/2024    PREOPERATIVE DIAGNOSIS:   Left patella fracture, displaced, comminuted inferior pole, closed, initial encounter   Ground level fall    POSTOPERATIVE DIAGNOSIS:   Left patella fracture, displaced, comminuted inferior pole, closed, initial encounter   Ground level fall    PROCEDURE:   Open reduction internal fixation left patella fracture    SURGEON:   Tres Mullins MD    ASSISTANT:    Michoacano Erazo MD    ANESTHESIA:   General + regional    EBL:    10mL    COMPLICATIONS:  none    IMPLANTS:   Fiberwire #5  Fiberwire #2    Vancomycin 1g    SPECIMENS:   none    INDICATIONS FOR PROCEDURE:  48-year-old female, healthy, community ambulator, 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  Patient had been seen at outside hospital and transferred to our facility for further care.  She was then admitted to our facility for assistance with mobility and ambulation.    Lives at home with family   Independent community ambulator   Works multiple jobs   Denies history of heart attack, stroke, blood clot, cancer, tobacco use    At the time my evaluation patient complained of isolated pain in her left knee.  She did not have an intact extensor mechanism.  No other areas of bony tenderness.  No numbness no tingling.    Discussed injury, patella fracture   Discussed both non operative operative management options   Non operative management of this injury would likely result in persistent incompetence of the extensor mechanism, inability to ambulate, extensor lag, weakness, poor gait, poor overall long-term outcome.  Discussed operative intervention the form open reduction internal fixation.  Because of the comminuted inferior pole nature, we would most likely treat this as a suture only repair in order to restore the patella tendon to its insertion point upon the patella  bone.  This may require some removal of bony comminution and small aspects of the inferior pole articular surface, which may result in some patella baja, knee stiffness.  With successful repair I would anticipate return to full ambulatory capacity, she may have some persistent long-term knee stiffness, and decreased flexion as well as some decreased strength compared to her contralateral knee and this knee pre injury.  Hopefully with operative intervention we can maximize both early and long-term outcome.    The risks, benefits, and alternatives to surgery were discussed with the patient and/or family.    Specific risks discussed included, but were not limited to:  damage to nearby structures, including neurovascular structures leading to loss of function or loss of limb, bleeding, need for blood transfusion, pain, stiffness, scarring, numbness, tingling, weakness, compartment syndrome, malunion/nonunion, hardware failure, hardware prominence, infection, need for multiple staged procedures, prolonged antibiotics, iatrogenic fracture, heterotopic ossification, arthritis, a variety of medical complications including but not limited to heart attack, stroke, deep venous thrombosis, pulmonary embolism, prolonged hospitalization, prolonged intubation, and death.   Patient and/or family expressed an understanding and desires to proceed with surgery.   All questions were answered.  No guarantees were implied or stated.  Informed consent was obtained.      OPERATIVE PROCEDURE:  Patient met in the preoperative hold area and the correct site and side of surgery being the left lower extremity were marked and verified.  Regional block placed by anesthesia team.  Patient brought back to the operative suite.  General anesthesia smoothly induced.  Patient transferred over to operative table.  Placed in supine position. All bony prominences were appropriately padded.  Patient received 2 g Ancef for preoperative antibiotics.  The  left lower extremity was then prepped and draped in normal sterile fashion.    Time-out was performed verifying the correct patient, site/side of surgery, surgical consent, radiographs as applicable, preop antibiotics, necessary equipment, anticipated blood loss, length of procedure, postoperative disposition.      Esmarch was utilized, tourniquet inflated at 300 mm of mercury for a proximally 120 minutes during the case.    We performed the midline anterior knee incision.  Skin incised with scalpel.  Hemostasis achieved as needed with electrocautery.  We developed full-thickness medial and lateral skin flaps.  Within the subcutaneous tissue, we encountered the hemarthrosis from the fracture which was then evacuated.  We then split the paratenon on the superior and inferior aspect of the knee peeled back from the patella.  There was significant full-thickness medial and lateral retinacular tears.  The inferior pole and significantly comminuted.  We curetted out hematoma, clot, debris from both the inferior and superior pole and we thoroughly lavaged the joint with saline.      We then evaluated the fracture.  There was a small segment of articular cartilage on the inferior pole, less than 1 cm.  This was deemed non-reconstructible and was partially removed.  We then planned for suture repair with Krackow sutures through the patella.  This was performed with a 5 FiberWire, x2, giving us 4 strands through the inferior pole grasping the patella tendon securely.  The suture ends exited through the bone so they would bring that bone backup to the intact superior pole.  We then grasp the superior pole with a point reduction clamp and performed a provisional reduction, and assessed this both visually and on fluoroscopic imaging were satisfied.  Of note there was a dorsal piece of comminution that we were able to key in guiding reduction.  We then drilled 3 bone tunnels through the patella, exiting the quad tendon.  We  shuttled the sutures through the patellar bone, and along the superior aspect of the patella.  They are passed into the same hole and split in the quad tendon superiorly.    We then reperformed our reduction and fine tuned it utilizing ball spike pusher and pointed reduction clamps.  We visualized anatomic reduction, checked it on fluoroscopy, and were also able to feel the underlying surface of the bone to ensure that there was no unduly prominent bone along the articular surface.  These sutures were then tied with the knee at full extension producing nice secure reduction.    In order to secure some of the bony comminution of the dorsal surface, we used a 2 FiberWire, placed a stitch along the superior medial aspect of the patella at the insertion point of the quad tendon, this was passed over the patella tendon, and through the inferior lateral aspect of the patella where there was a segment of bony comminution, this was then tied over the patella this secure this bone in a more appropriate position.  We then performed a retinacular repair with 2 FiberWire both medial and lateral utilizing interrupted sutures.    We then reassessed fracture reduction, recreation of the extensor mechanism were satisfied.      Wound was thoroughly irrigated with saline.      1 g vancomycin placed deep     The slits in the quad tendon were closed with 1 Vicryl.  The periosteum overlying the fracture was closed with 1 Vicryl.  The peritenon was closed with 1 Vicryl.  We then performed a cerclage stitch with a 2 FiberWire around the world type fashion close to the patella bone.    Fascia closed with 1 Vicryl, deep tissue closed with 0 Vicryl, subcutaneous tissue closed with a combination of 2-0 Vicryl and 3-0 Vicryl.  Skin closed with 3-0 Monocryl.  Prineo/Dermabond, Aquacel, gauze, Tegaderm, cast padding, Ace wrap dressing applied hinged knee brace locked in full extension applied    At the conclusion of procedure the patient had  soft and compressible compartments, brisk cap refill, palpable DP/PT pulse in the operative extremity.    Prior to final closure all counts were confirmed to be correct.  Patient tolerated the procedure well without any complications, was awoken from anesthesia, transferred PACU for further recovery.    POSTOPERATIVE PLAN:  48F, ground level fall 7.6.24  L patella fx, comminuted inferior pole     7.8.24 - ORIF L patella     Abx x 24hrs  ASA 81 mg BID x 8 wks postop for DVT prophylaxis  WBAT LLE w/ hinged knee brace locked in extension and brace on properly  See full protocol below     Ca, Vit D, fragility fx clinic referral     X-rays at subsequent followups:  L knee     Follow-up postop 2 weeks, 6 weeks, 3 months, 6 months, 1 year     =====================  Tres Mullins MD  Orthopaedic Surgery              Postop protocol             =====================  Tres Mullins MD  Orthopaedic Surgery

## 2024-07-08 NOTE — ANESTHESIA PROCEDURE NOTES
L Fem PNC    Patient location during procedure: pre-op   Block not for primary anesthetic.  Reason for block: at surgeon's request and post-op pain management   Post-op Pain Location: L patella   Start time: 7/8/2024 7:40 AM  Timeout: 7/8/2024 7:40 AM   End time: 7/8/2024 7:52 AM    Staffing  Authorizing Provider: Kendrick Vickers MD  Performing Provider: Dilan Crespo MD    Staffing  Performed by: Dilan Crespo MD  Authorized by: Kendrick Vickers MD    Preanesthetic Checklist  Completed: patient identified, IV checked, site marked, risks and benefits discussed, surgical consent, monitors and equipment checked, pre-op evaluation and timeout performed  Peripheral Block  Patient position: supine  Prep: ChloraPrep and site prepped and draped  Patient monitoring: heart rate, cardiac monitor, continuous pulse ox, continuous capnometry and frequent blood pressure checks  Block type: femoral  Laterality: left  Injection technique: continuous  Needle  Needle type: Tuohy   Needle gauge: 17 G  Needle length: 3.5 in  Needle localization: anatomical landmarks and ultrasound guidance  Catheter type: spring wound  Catheter size: 19 G  Test dose: lidocaine 1.5% with Epi 1-to-200,000 and negative   -ultrasound image captured on disc.  Assessment  Injection assessment: negative aspiration, negative parasthesia and local visualized surrounding nerve  Paresthesia pain: none  Heart rate change: no  Slow fractionated injection: yes  Pain Tolerance: comfortable throughout block and no complaints  Medications:    Medications: bupivacaine (pf) (MARCAINE) injection 0.25% - Perineural   15 mL - 7/8/2024 7:50:00 AM    Additional Notes  VSS.  [facility] RN monitoring vitals throughout procedure.  Patient tolerated procedure well.                07-Dec-2018 06:48

## 2024-07-08 NOTE — ANESTHESIA PROCEDURE NOTES
Intubation    Date/Time: 7/8/2024 7:39 AM    Performed by: Verito Ramires CRNA  Authorized by: Mitch Last MD    Intubation:     Induction:  Intravenous    Intubated:  Postinduction    Mask Ventilation:  Easy mask    Attempts:  1    Attempted By:  CRNA    Method of Intubation:  Video laryngoscopy    Blade:  Addison 3    Laryngeal View Grade: Grade I - full view of cords      Difficult Airway Encountered?: No      Complications:  None    Airway Device:  Oral endotracheal tube    Airway Device Size:  7.0    Style/Cuff Inflation:  Cuffed (inflated to minimal occlusive pressure)    Tube secured:  21    Secured at:  The lips    Placement Verified By:  Capnometry    Complicating Factors:  None    Findings Post-Intubation:  BS equal bilateral and atraumatic/condition of teeth unchanged

## 2024-07-08 NOTE — ASSESSMENT & PLAN NOTE
- s/p mechanical fall at [employer] she works at due to standing water with pain and difficulty walking. Transferred here for ortho from [facility] due to patela fx with sever comminution seen on imaging  -initially ortho reported plan for outpatient surgery repair but ended up taking to OR 7/8 AM for ORIF patella. Plan for ASA BID x 8 weeks- end date September 3rd, wbat in hinged knee brace locked in extension  -pain control not controlled pre op 7/8 overnight with adjustments made and now anesthesia managing post op with ropivicaine and multimodals in place. Will adjust discharge med rec once closer to discharge as previously med-rec-ed with norco before adjustments and plan to discharge with different regimen now  -progerssive mobility plan with hinged knee brace per ortho op note (See image copied and pasted into op note for details by week)

## 2024-07-08 NOTE — TRANSFER OF CARE
"Anesthesia Transfer of Care Note    Patient: Catalina Abrams    Procedure(s) Performed: Procedure(s) (LRB):  ORIF, FRACTURE, PATELLA - LEFT (Left)    Patient location: PACU    Anesthesia Type: general    Transport from OR: Transported from OR on 6-10 L/min O2 by face mask with adequate spontaneous ventilation    Post pain: adequate analgesia    Post assessment: no apparent anesthetic complications    Post vital signs: stable    Level of consciousness: awake and alert    Nausea/Vomiting: no nausea/vomiting    Complications: none    Transfer of care protocol was followed      Last vitals: Visit Vitals  /66   Pulse 95   Temp 36.8 °C (98.2 °F) (Temporal)   Resp 17   Ht 5' 5" (1.651 m)   Wt 72.6 kg (160 lb)   SpO2 100%   Breastfeeding No   BMI 26.63 kg/m²     "

## 2024-07-09 PROCEDURE — 97535 SELF CARE MNGMENT TRAINING: CPT

## 2024-07-09 PROCEDURE — A4216 STERILE WATER/SALINE, 10 ML: HCPCS | Performed by: HOSPITALIST

## 2024-07-09 PROCEDURE — 25000003 PHARM REV CODE 250: Performed by: HOSPITALIST

## 2024-07-09 PROCEDURE — 97164 PT RE-EVAL EST PLAN CARE: CPT

## 2024-07-09 PROCEDURE — 11000001 HC ACUTE MED/SURG PRIVATE ROOM

## 2024-07-09 PROCEDURE — 97116 GAIT TRAINING THERAPY: CPT

## 2024-07-09 PROCEDURE — 97165 OT EVAL LOW COMPLEX 30 MIN: CPT

## 2024-07-09 PROCEDURE — 25000003 PHARM REV CODE 250

## 2024-07-09 PROCEDURE — 25000003 PHARM REV CODE 250: Performed by: NURSE PRACTITIONER

## 2024-07-09 RX ORDER — ACETAMINOPHEN 500 MG
1000 TABLET ORAL EVERY 6 HOURS
Start: 2024-07-09

## 2024-07-09 RX ORDER — CELECOXIB 200 MG/1
200 CAPSULE ORAL DAILY
Qty: 30 CAPSULE | Refills: 1 | Status: SHIPPED | OUTPATIENT
Start: 2024-07-10

## 2024-07-09 RX ORDER — ASPIRIN 81 MG/1
81 TABLET ORAL 2 TIMES DAILY
Start: 2024-07-09 | End: 2025-07-09

## 2024-07-09 RX ORDER — OXYCODONE HYDROCHLORIDE 5 MG/1
5 TABLET ORAL EVERY 4 HOURS PRN
Qty: 40 TABLET | Refills: 0 | Status: SHIPPED | OUTPATIENT
Start: 2024-07-09

## 2024-07-09 RX ORDER — METHOCARBAMOL 500 MG/1
500 TABLET, FILM COATED ORAL EVERY 6 HOURS
Qty: 65 TABLET | Refills: 1 | Status: SHIPPED | OUTPATIENT
Start: 2024-07-09

## 2024-07-09 RX ORDER — AMOXICILLIN 250 MG
1 CAPSULE ORAL 2 TIMES DAILY
Qty: 30 TABLET | Refills: 0 | Status: SHIPPED | OUTPATIENT
Start: 2024-07-09

## 2024-07-09 RX ADMIN — OXYCODONE 5 MG: 5 TABLET ORAL at 05:07

## 2024-07-09 RX ADMIN — METHOCARBAMOL 500 MG: 500 TABLET ORAL at 05:07

## 2024-07-09 RX ADMIN — ASPIRIN 81 MG: 81 TABLET, COATED ORAL at 08:07

## 2024-07-09 RX ADMIN — OXYCODONE 5 MG: 5 TABLET ORAL at 01:07

## 2024-07-09 RX ADMIN — SENNOSIDES AND DOCUSATE SODIUM 1 TABLET: 50; 8.6 TABLET ORAL at 08:07

## 2024-07-09 RX ADMIN — OXYCODONE 5 MG: 5 TABLET ORAL at 10:07

## 2024-07-09 RX ADMIN — ACETAMINOPHEN 1000 MG: 500 TABLET ORAL at 05:07

## 2024-07-09 RX ADMIN — Medication 10 ML: at 10:07

## 2024-07-09 RX ADMIN — METHOCARBAMOL 500 MG: 500 TABLET ORAL at 12:07

## 2024-07-09 RX ADMIN — OXYCODONE 5 MG: 5 TABLET ORAL at 09:07

## 2024-07-09 RX ADMIN — POLYETHYLENE GLYCOL 3350 17 G: 17 POWDER, FOR SOLUTION ORAL at 08:07

## 2024-07-09 RX ADMIN — Medication 6 MG: at 11:07

## 2024-07-09 RX ADMIN — ACETAMINOPHEN 1000 MG: 500 TABLET ORAL at 11:07

## 2024-07-09 RX ADMIN — ACETAMINOPHEN 1000 MG: 500 TABLET ORAL at 12:07

## 2024-07-09 RX ADMIN — OXYCODONE 5 MG: 5 TABLET ORAL at 03:07

## 2024-07-09 RX ADMIN — METHOCARBAMOL 500 MG: 500 TABLET ORAL at 11:07

## 2024-07-09 RX ADMIN — Medication 10 ML: at 06:07

## 2024-07-09 RX ADMIN — CELECOXIB 200 MG: 200 CAPSULE ORAL at 08:07

## 2024-07-09 RX ADMIN — Medication 6 MG: at 01:07

## 2024-07-09 RX ADMIN — Medication 10 ML: at 02:07

## 2024-07-09 NOTE — PROGRESS NOTES
[facility] Medicine  Progress Note    Patient Name: Catalina Abrams  MRN: [id number]  Patient Class: IP- Inpatient   Admission Date: 7/6/2024  Length of Stay: 1 days  Attending Physician: Jennifer Chilel MD  Primary Care Provider: Vicky Antonio NP        Subjective:     Principal Problem:Closed fracture of left patella        HPI:  47 yo F who presented to [facility] as a transfer for L comminuted patellar fracture after a ground level fall for ortho evaluation. She is neurovascularly intact and otherwise w/o any other issues. Ortho evaluated her in the ED and recommended admission for PT and likely ORIF after her swelling goes down within the next few days. She c/o pain and swelling  at the L knee  . She denies fever, CP, SOB, N/V/D     Overview/Hospital Course:  No notes on file    Interval history- worked with therapy this morning and got into bathroom, had increased pain after that and needed more assistnace back to the chair and sitting in chair now in room. Wanted to use PNC but bolus was too soon so if persists can ask anesthesia if can do extra bolus as sometimes can do this when have breakthrough pain. Plan for RW on discharge and gaev her a copy of ortho print out in op note detailing the care plan of post op dates and progressing of weight bearing for her to have. ASA BID for 8 weeks, end daet September 3rd. Working on pain control and likely discahrge tomorrow if pain control improving with ortho f/u. She has done well without cigarettes x 4 days while here and is going to plan to stay off long term.    Review of patient's allergies indicates:  No Known Allergies    No current facility-administered medications on file prior to encounter.     Current Outpatient Medications on File Prior to Encounter   Medication Sig    omeprazole (PRILOSEC) 20 MG capsule Take 1 capsule (20 mg total) by mouth once daily.     Family History       Problem Relation (Age of Onset)    Breast cancer Maternal  Aunt, Maternal Cousin    COPD Mother    Diabetes Mother    Hypertension Mother, Father          Tobacco Use    Smoking status: Every Day     Current packs/day: 0.25     Average packs/day: 0.3 packs/day for 30.0 years (7.5 ttl pk-yrs)     Types: Cigarettes    Smokeless tobacco: Never   Substance and Sexual Activity    Alcohol use: No    Drug use: No    Sexual activity: Yes     Review of Systems   Constitutional:  Negative for appetite change.   Respiratory:  Negative for cough and shortness of breath.    Cardiovascular:  Negative for chest pain and leg swelling.   Gastrointestinal:  Negative for abdominal distention, abdominal pain, constipation and diarrhea.   Genitourinary:  Negative for difficulty urinating and dysuria.   Musculoskeletal:  Positive for arthralgias and joint swelling.   Neurological:  Negative for dizziness and headaches.     Objective:     Vital Signs (Most Recent):  Temp: 98.4 °F (36.9 °C) (07/09/24 1036)  Pulse: 90 (07/09/24 1036)  Resp: 18 (07/09/24 1036)  BP: 117/64 (07/09/24 1036)  SpO2: 96 % (07/09/24 1036) Vital Signs (24h Range):  Temp:  [98.4 °F (36.9 °C)-98.8 °F (37.1 °C)] 98.4 °F (36.9 °C)  Pulse:  [] 90  Resp:  [14-20] 18  SpO2:  [93 %-100 %] 96 %  BP: (100-142)/(54-84) 117/64     Weight: 72.6 kg (160 lb)  Body mass index is 26.63 kg/m².     Physical Exam  Constitutional:       Appearance: Normal appearance.   HENT:      Head: Normocephalic and atraumatic.      Mouth/Throat:      Mouth: Mucous membranes are moist.   Cardiovascular:      Rate and Rhythm: Normal rate and regular rhythm.      Pulses: Normal pulses.      Heart sounds: Normal heart sounds.   Pulmonary:      Effort: Pulmonary effort is normal.      Breath sounds: Normal breath sounds. No rales.   Abdominal:      General: Abdomen is flat. Bowel sounds are normal. There is no distension.      Palpations: Abdomen is soft.      Tenderness: There is no abdominal tenderness.   Musculoskeletal:         General: Normal range  of motion.      Cervical back: Normal range of motion and neck supple.      Comments: Hinged knee brace locked in extension to LLE with ace bandage throughout knee. Ropivicaine in place.   Skin:     General: Skin is warm and dry.   Neurological:      General: No focal deficit present.      Mental Status: She is alert and oriented to person, place, and time.   Psychiatric:         Mood and Affect: Mood normal.                Significant Labs: All pertinent labs within the past 24 hours have been reviewed.    Significant Imaging: I have reviewed all pertinent imaging results/findings within the past 24 hours.    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 Transferred here for ortho from [facility] due to patela fx with sever comminution seen on imaging  -initially ortho reported plan for outpatient surgery repair but ended up taking to OR 7/8 AM for ORIF patella. Plan for ASA BID x 8 weeks- end date September 3rd, wbat in hinged knee brace locked in extension  -pain control not controlled pre op 7/8 overnight with adjustments made and now anesthesia managing post op with ropivicaine and multimodals in place. Some increased pain with therapy session 7/9 and working for pain control as calmed down now in chair post session. Will adjust discharge med rec once closer to discharge as previously med-rec-ed with norco before adjustments and plan to discharge with different regimen now   -progerssive mobility plan with hinged knee brace per ortho op note (See image copied and pasted into op note for details by week) and gave her copy of this .        Nicotine use  She reports no issues with withdrawal and declined nicotine patch  Counselled on cessation    Doing well without cigarettes since admit and plans to try to stay off them on discharge      VTE Risk Mitigation (From admission, onward)            Ordered     IP VTE LOW RISK PATIENT  Once         07/07/24 0703     Place sequential compression device  Until discontinued         07/07/24 0703                    Discharge Planning   KATHLEEN: 7/10/2024     Code Status: Full Code   Is the patient medically ready for discharge?:     Reason for patient still in hospital (select all that apply): Patient trending condition  Discharge Plan A: Home with family                  Jennifer Chilel MD  Department of Hospital Medicine   [facility] - Surgery

## 2024-07-09 NOTE — PLAN OF CARE
Problem: Occupational Therapy  Goal: Occupational Therapy Goal  Description: Goals to be met by: 8/9/24     Patient will increase functional independence with ADLs by performing:    UE Dressing with [place].  LE Dressing with Moderate Assistance.  Grooming while standing at sink with [place].  Supine to sit with [place].  Toilet transfer to toilet with Modified [place].    Outcome: Progressing     OT eval completed

## 2024-07-09 NOTE — PLAN OF CARE
[facility] - Surgery  Initial Discharge Assessment       Primary Care Provider: Vicky Antonio NP    Admission Diagnosis: Fall [W19.XXXA]  Closed displaced transverse fracture of left patella, initial encounter [S82.032A]  Acute pain of left knee [M25.562]    Admission Date: 7/6/2024  Expected Discharge Date: 7/9/2024         Payor: [facility] / Plan: [facility] [facility] [employer] [place] / Product Type: Commercial /     Extended Emergency Contact Information  Primary Emergency Contact: J Luis Abrams  Mobile Phone: [phone]  Relation: Father  Preferred language: English   needed? No    Discharge Plan A: Home with family         [facility] [place] [facility] [address]  [address]  [place] [address]  Phone: [phone] Fax: [phone]      Initial Assessment (most recent)       Adult Discharge Assessment - 07/09/24 0857          Discharge Assessment    Assessment Type Discharge Planning Assessment     Confirmed/corrected address, phone number and insurance Yes     Confirmed Demographics Correct on Facesheet     Source of Information patient     Does patient/caregiver understand observation status Yes     Communicated KATHLEEN with patient/caregiver Yes     People in Home alone     Do you expect to return to your current living situation? Yes     Do you have help at home or someone to help you manage your care at home? Yes     Who are your caregiver(s) and their phone number(s)? Ken Samaniego (Allyson') [phone]     Prior to hospitilization cognitive status: Alert/Oriented     Current cognitive status: Alert/Oriented     Walking or Climbing Stairs Difficulty no     Dressing/Bathing Difficulty no     Equipment Currently Used at Home none     Readmission within 30 days? No     Patient currently being followed by outpatient case management? No     Do you currently have service(s) that help you manage your care at home? No     Do you take prescription medications? No     Do  you have prescription coverage? No     Do you have any problems affording any of your prescribed medications? No     Is the patient taking medications as prescribed? yes     Who is going to help you get home at discharge? Torri     How do you get to doctors appointments? family or friend will provide     Are you on dialysis? No     Do you take coumadin? No     Discharge Plan A Home with family        Physical Activity    On average, how many days per week do you engage in moderate to strenuous exercise (like a brisk walk)? 0 days     On average, how many minutes do you engage in exercise at this level? 0 min        Financial Resource Strain    How hard is it for you to pay for the very basics like food, housing, medical care, and heating? Not very hard        Housing Stability    In the last 12 months, was there a time when you were not able to pay the mortgage or rent on time? No     At any time in the past 12 months, were you homeless or living in a shelter (including now)? No        Transportation Needs    Has the lack of transportation kept you from medical appointments, meetings, work or from getting things needed for daily living? No        Food Insecurity    Within the past 12 months, you worried that your food would run out before you got the money to buy more. Never true     Within the past 12 months, the food you bought just didn't last and you didn't have money to get more. Never true        Stress    Do you feel stress - tense, restless, nervous, or anxious, or unable to sleep at night because your mind is troubled all the time - these days? Only a little        Social Isolation    How often do you feel lonely or isolated from those around you?  Never        Alcohol Use    Q1: How often do you have a drink containing alcohol? Never     Q2: How many drinks containing alcohol do you have on a typical day when you are drinking? Patient does not drink     Q3: How often do you have six or more drinks  on one occasion? Never        Utilities    In the past 12 months has the electric, gas, oil, or water company threatened to shut off services in your home? No        Health Literacy    How often do you need to have someone help you when you read instructions, pamphlets, or other written material from your doctor or pharmacy? Never                        Spoke with patient at bedside to complete d/c planning assessment. Patient lives alone in a single story home without steps. Independent with ADL's. No DME. Verified PCP, Pharmacy and health insurance. PT/OT eval Pending. Will continue to follow for needs. Patient will have help at home from her fiance' and children. Discharge Plan A and Plan B have been determined by review of patient's clinical status, future medical and therapeutic needs, and coverage/benefits for post-acute care in coordination with multidisciplinary team members.    Connie FARAH  Case Management  [facility]  [phone]

## 2024-07-09 NOTE — PLAN OF CARE
PT re-eval completed- see note for details, goals and POC updated.     Problem: Physical Therapy  Goal: Physical Therapy Goal  Description: Goals to be met by: 24     Patient will increase functional independence with mobility by performin. Supine to sit with Set-up [place]  2. Sit to stand transfer with Modified [place]  3. Bed to chair transfer with Modified [place] using Rolling Walker  4. Gait  x 150 feet with Modified [place] using Rolling Walker.     DME Recommendation  Patient demonstrates a mobility limitation that significantly impairs their ability to participate in one or more mobility related activities of daily living. Patient's mobility limitation cannot be sufficiently resolved with the use of a cane, but can be sufficiently resolved with the use of a rolling walker.The use of a rolling walker will considerably improve their ability to participate in MRADLs. Patient will use the walker on a regular basis at home.     Outcome: Progressing   2024\

## 2024-07-09 NOTE — ANESTHESIA POST-OP PAIN MANAGEMENT
Acute Pain Service Progress Note    Catalina Abrams is a 48 y.o., female, [id number].    Surgery:  ORIF, FRACTURE, PATELLA - LEFT     Post Op Day #: 1    Catheter type: perineural  femoral    Infusion type: Ropivacaine 0.2%  7mL/3hr basal and 5mL/30min demand    Problem List:    Active Hospital Problems    Diagnosis  POA    *Closed fracture of left patella [S82.002A]  Yes    Nicotine use [Z72.0]  Yes      Resolved Hospital Problems   No resolved problems to display.       Subjective:     General appearance of alert, oriented, no complaints   Pain with rest: 4    Numbers   Pain with movement: 6    Numbers   Side Effects    1. Pruritis No    2. Nausea No    3. Motor Blockade No, 0=Ability to raise lower extremities off bed    4. Sedation No, 1=awake and alert    Objective:       Catheter site clean, dry, intact         Vitals   Vitals:    07/09/24 0756   BP: 117/64   Pulse: 90   Resp: 18   Temp: 36.9 °C (98.4 °F)        Labs    Admission on 07/06/2024   Component Date Value Ref Range Status    WBC 07/06/2024 15.65 (H)  3.90 - 12.70 K/uL Final    RBC 07/06/2024 4.98  4.00 - 5.40 M/uL Final    Hemoglobin 07/06/2024 15.8  12.0 - 16.0 g/dL Final    Hematocrit 07/06/2024 48.0  37.0 - 48.5 % Final    MCV 07/06/2024 96  82 - 98 fL Final    MCH 07/06/2024 31.7 (H)  27.0 - 31.0 pg Final    MCHC 07/06/2024 32.9  32.0 - 36.0 g/dL Final    RDW 07/06/2024 12.9  11.5 - 14.5 % Final    Platelets 07/06/2024 256  150 - 450 K/uL Final    MPV 07/06/2024 12.7  9.2 - 12.9 fL Final    Immature Granulocytes 07/06/2024 0.5  0.0 - 0.5 % Final    Gran # (ANC) 07/06/2024 11.9 (H)  1.8 - 7.7 K/uL Final    Immature Grans (Abs) 07/06/2024 0.08 (H)  0.00 - 0.04 K/uL Final    Lymph # 07/06/2024 2.6  1.0 - 4.8 K/uL Final    Mono # 07/06/2024 0.9  0.3 - 1.0 K/uL Final    Eos # 07/06/2024 0.1  0.0 - 0.5 K/uL Final    Baso # 07/06/2024 0.06  0.00 - 0.20 K/uL Final    nRBC 07/06/2024 0  0 /100 WBC Final    Gran % 07/06/2024 75.9 (H)  38.0 - 73.0 % Final     Lymph % 07/06/2024 16.9 (L)  18.0 - 48.0 % Final    Mono % 07/06/2024 5.6  4.0 - 15.0 % Final    Eosinophil % 07/06/2024 0.7  0.0 - 8.0 % Final    Basophil % 07/06/2024 0.4  0.0 - 1.9 % Final    Differential Method 07/06/2024 Automated   Final    Sodium 07/06/2024 139  136 - 145 mmol/L Final    Potassium 07/06/2024 3.9  3.5 - 5.1 mmol/L Final    Chloride 07/06/2024 107  95 - 110 mmol/L Final    CO2 07/06/2024 21 (L)  23 - 29 mmol/L Final    Glucose 07/06/2024 132 (H)  70 - 110 mg/dL Final    BUN 07/06/2024 12  6 - 20 mg/dL Final    Creatinine 07/06/2024 0.8  0.5 - 1.4 mg/dL Final    Calcium 07/06/2024 10.1  8.7 - 10.5 mg/dL Final    Total Protein 07/06/2024 7.6  6.0 - 8.4 g/dL Final    Albumin 07/06/2024 4.2  3.5 - 5.2 g/dL Final    Total Bilirubin 07/06/2024 0.7  0.1 - 1.0 mg/dL Final    Alkaline Phosphatase 07/06/2024 83  55 - 135 U/L Final    AST 07/06/2024 16  10 - 40 U/L Final    ALT 07/06/2024 19  10 - 44 U/L Final    eGFR 07/06/2024 >60.0  >60 mL/min/1.73 m^2 Final    Anion Gap 07/06/2024 11  8 - 16 mmol/L Final    Prothrombin Time 07/06/2024 10.9  9.0 - 12.5 sec Final    INR 07/06/2024 1.0  0.8 - 1.2 Final    aPTT 07/06/2024 23.6  21.0 - 32.0 sec Final    Group & Rh 07/06/2024 A POS   Final    Indirect Raphael 07/06/2024 NEG   Final    Specimen Outdate 07/06/2024 07/09/2024 23:59   Final    QRS Duration 07/06/2024 80  ms Final    [facility] QTC Calculation 07/06/2024 442  ms Final    Transferrin 07/06/2024 325  200 - 375 mg/dL Final    Phosphorus 07/06/2024 2.5 (L)  2.7 - 4.5 mg/dL Final    Magnesium 07/06/2024 2.3  1.6 - 2.6 mg/dL Final    Hemoglobin A1C 07/06/2024 5.0  4.0 - 5.6 % Final    Estimated Avg Glucose 07/06/2024 97  68 - 131 mg/dL Final    Vit D, 25-Hydroxy 07/06/2024 41  30 - 96 ng/mL Final    Prealbumin 07/06/2024 27  20 - 43 mg/dL Final    WBC 07/07/2024 10.63  3.90 - 12.70 K/uL Final    RBC 07/07/2024 4.37  4.00 - 5.40 M/uL Final    Hemoglobin 07/07/2024 13.7  12.0 - 16.0 g/dL Final    Hematocrit  07/07/2024 43.0  37.0 - 48.5 % Final    MCV 07/07/2024 98  82 - 98 fL Final    MCH 07/07/2024 31.4 (H)  27.0 - 31.0 pg Final    MCHC 07/07/2024 31.9 (L)  32.0 - 36.0 g/dL Final    RDW 07/07/2024 12.9  11.5 - 14.5 % Final    Platelets 07/07/2024 215  150 - 450 K/uL Final    MPV 07/07/2024 12.1  9.2 - 12.9 fL Final    Immature Granulocytes 07/07/2024 0.3  0.0 - 0.5 % Final    Gran # (ANC) 07/07/2024 6.9  1.8 - 7.7 K/uL Final    Immature Grans (Abs) 07/07/2024 0.03  0.00 - 0.04 K/uL Final    Lymph # 07/07/2024 2.8  1.0 - 4.8 K/uL Final    Mono # 07/07/2024 0.8  0.3 - 1.0 K/uL Final    Eos # 07/07/2024 0.1  0.0 - 0.5 K/uL Final    Baso # 07/07/2024 0.03  0.00 - 0.20 K/uL Final    nRBC 07/07/2024 0  0 /100 WBC Final    Gran % 07/07/2024 64.5  38.0 - 73.0 % Final    Lymph % 07/07/2024 26.4  18.0 - 48.0 % Final    Mono % 07/07/2024 7.6  4.0 - 15.0 % Final    Eosinophil % 07/07/2024 0.9  0.0 - 8.0 % Final    Basophil % 07/07/2024 0.3  0.0 - 1.9 % Final    Differential Method 07/07/2024 Automated   Final    Sodium 07/07/2024 137  136 - 145 mmol/L Final    Potassium 07/07/2024 4.0  3.5 - 5.1 mmol/L Final    Chloride 07/07/2024 105  95 - 110 mmol/L Final    CO2 07/07/2024 22 (L)  23 - 29 mmol/L Final    Glucose 07/07/2024 114 (H)  70 - 110 mg/dL Final    BUN 07/07/2024 11  6 - 20 mg/dL Final    Creatinine 07/07/2024 0.8  0.5 - 1.4 mg/dL Final    Calcium 07/07/2024 9.6  8.7 - 10.5 mg/dL Final    Anion Gap 07/07/2024 10  8 - 16 mmol/L Final    eGFR 07/07/2024 >60.0  >60 mL/min/1.73 m^2 Final        Meds   Current Facility-Administered Medications   Medication Dose Route Frequency Provider Last Rate Last Admin    acetaminophen tablet 1,000 mg  1,000 mg Oral Q6H Juve Jackson DO   1,000 mg at 07/09/24 0550    albuterol-ipratropium 2.5 mg-0.5 mg/3 mL nebulizer solution 3 mL  3 mL Nebulization Q6H PRN Jennifer Chilel MD        aspirin EC tablet 81 mg  81 mg Oral BID Jennifer Chilel MD   81 mg at 07/09/24 0823     celecoxib capsule 200 mg  200 mg Oral Daily Juve Jackson DO   200 mg at 07/09/24 0845    dextrose 10% bolus 125 mL 125 mL  12.5 g Intravenous PRN Jennifre Chilel MD        dextrose 10% bolus 125 mL 125 mL  12.5 g Intravenous PRN Mitch Last MD        dextrose 10% bolus 250 mL 250 mL  25 g Intravenous PRN Jennifer Chilel MD        dextrose 10% bolus 250 mL 250 mL  25 g Intravenous PRN Mitch Last MD        glucagon (human recombinant) injection 1 mg  1 mg Intramuscular PRJennifer Valverde MD        glucagon (human recombinant) injection 1 mg  1 mg Intramuscular PRN Mitch Last MD        glucose chewable tablet 16 g  16 g Oral PRJennifer Valverde MD        glucose chewable tablet 24 g  24 g Oral PRJennifer Valverde MD        melatonin tablet 6 mg  6 mg Oral Nightly PRN Jennifer Chilel MD   6 mg at 07/09/24 0113    methocarbamoL tablet 500 mg  500 mg Oral Q6H Juve Jackson DO   500 mg at 07/09/24 0550    naloxone 0.4 mg/mL injection 0.02 mg  0.02 mg Intravenous PRN Jennifer Chilel MD        nicotine 14 mg/24 hr 1 patch  1 patch Transdermal Daily Jennifer Chilel MD        ondansetron injection 4 mg  4 mg Intravenous Q8H PRN Jennifer Chilel MD   4 mg at 07/07/24 2040    oxyCODONE immediate release tablet 5 mg  5 mg Oral Q4H PRN Juve Jackson DO   5 mg at 07/09/24 0550    polyethylene glycol packet 17 g  17 g Oral Daily Jennifer Chilel MD   17 g at 07/09/24 0845    prochlorperazine injection Soln 5 mg  5 mg Intravenous Q6H PRJennifer Valverde MD        ropivacaine 0.2% Perineural Pump infusion 500 ML   Perineural Continuous Dilan Crespo MD   New Bag at 07/08/24 1106    senna-docusate 8.6-50 mg per tablet 1 tablet  1 tablet Oral BID Queenie Ravi NP   1 tablet at 07/09/24 0845    sodium chloride 0.9% flush 10 mL  10 mL Intravenous Q8H Jennifer Chilel MD   10 mL at 07/09/24 0600         Assessment:    Pain control adequate. She reports  the knee immobilizer was very irritating last night after surgery but after some improved padding/cushion she is much better.     Plan:    Continue femoral PNC.   Continue multimodal pain regimen with scheduled acetaminophen 1g q6h, celecoxib 200mg qd, and methocarbamol 500mg q6h.   Continue PRN oxycodone 5mg q4h.       Thank you for your consult. We will follow up with the patient. Please call [phone] or [phone] with any questions.    Michael Jackson, DO  PGY-4/CA-3

## 2024-07-09 NOTE — SUBJECTIVE & OBJECTIVE
Interval history- worked with therapy this morning and got into bathroom, had increased pain after that and needed more assistnace back to the chair and sitting in chair now in room. Wanted to use PNC but bolus was too soon so if persists can ask anesthesia if can do extra bolus as sometimes can do this when have breakthrough pain. Plan for RW on discharge and gaev her a copy of ortho print out in op note detailing the care plan of post op dates and progressing of weight bearing for her to have. ASA BID for 8 weeks, end daet September 3rd. Working on pain control and likely discahrge tomorrow if pain control improving with ortho f/u. She has done well without cigarettes x 4 days while here and is going to plan to stay off long term.    Review of patient's allergies indicates:  No Known Allergies    No current facility-administered medications on file prior to encounter.     Current Outpatient Medications on File Prior to Encounter   Medication Sig    omeprazole (PRILOSEC) 20 MG capsule Take 1 capsule (20 mg total) by mouth once daily.     Family History       Problem Relation (Age of Onset)    Breast cancer Maternal Aunt, Maternal Cousin    COPD Mother    Diabetes Mother    Hypertension Mother, Father          Tobacco Use    Smoking status: Every Day     Current packs/day: 0.25     Average packs/day: 0.3 packs/day for 30.0 years (7.5 ttl pk-yrs)     Types: Cigarettes    Smokeless tobacco: Never   Substance and Sexual Activity    Alcohol use: No    Drug use: No    Sexual activity: Yes     Review of Systems   Constitutional:  Negative for appetite change.   Respiratory:  Negative for cough and shortness of breath.    Cardiovascular:  Negative for chest pain and leg swelling.   Gastrointestinal:  Negative for abdominal distention, abdominal pain, constipation and diarrhea.   Genitourinary:  Negative for difficulty urinating and dysuria.   Musculoskeletal:  Positive for arthralgias and joint swelling.   Neurological:   Negative for dizziness and headaches.     Objective:     Vital Signs (Most Recent):  Temp: 98.4 °F (36.9 °C) (07/09/24 1036)  Pulse: 90 (07/09/24 1036)  Resp: 18 (07/09/24 1036)  BP: 117/64 (07/09/24 1036)  SpO2: 96 % (07/09/24 1036) Vital Signs (24h Range):  Temp:  [98.4 °F (36.9 °C)-98.8 °F (37.1 °C)] 98.4 °F (36.9 °C)  Pulse:  [] 90  Resp:  [14-20] 18  SpO2:  [93 %-100 %] 96 %  BP: (100-142)/(54-84) 117/64     Weight: 72.6 kg (160 lb)  Body mass index is 26.63 kg/m².     Physical Exam  Constitutional:       Appearance: Normal appearance.   HENT:      Head: Normocephalic and atraumatic.      Mouth/Throat:      Mouth: Mucous membranes are moist.   Cardiovascular:      Rate and Rhythm: Normal rate and regular rhythm.      Pulses: Normal pulses.      Heart sounds: Normal heart sounds.   Pulmonary:      Effort: Pulmonary effort is normal.      Breath sounds: Normal breath sounds. No rales.   Abdominal:      General: Abdomen is flat. Bowel sounds are normal. There is no distension.      Palpations: Abdomen is soft.      Tenderness: There is no abdominal tenderness.   Musculoskeletal:         General: Normal range of motion.      Cervical back: Normal range of motion and neck supple.      Comments: Hinged knee brace locked in extension to LLE with ace bandage throughout knee. Ropivicaine in place.   Skin:     General: Skin is warm and dry.   Neurological:      General: No focal deficit present.      Mental Status: She is alert and oriented to person, place, and time.   Psychiatric:         Mood and Affect: Mood normal.                Significant Labs: All pertinent labs within the past 24 hours have been reviewed.    Significant Imaging: I have reviewed all pertinent imaging results/findings within the past 24 hours.

## 2024-07-09 NOTE — SUBJECTIVE & OBJECTIVE
"Principal Problem:Closed fracture of left patella    Principal Orthopedic Problem: same, s/p ORIF left patella 7/8/24    Interval History: Pt seen and examined at bedside. NAEON, VSS, AF. Pain controlled. Denies fevers, chills, chest pain, SOB, N/V/D.   Pending PT eval.     Review of patient's allergies indicates:  No Known Allergies    Current Facility-Administered Medications   Medication    acetaminophen tablet 1,000 mg    albuterol-ipratropium 2.5 mg-0.5 mg/3 mL nebulizer solution 3 mL    aspirin EC tablet 81 mg    celecoxib capsule 200 mg    dextrose 10% bolus 125 mL 125 mL    dextrose 10% bolus 125 mL 125 mL    dextrose 10% bolus 250 mL 250 mL    dextrose 10% bolus 250 mL 250 mL    glucagon (human recombinant) injection 1 mg    glucagon (human recombinant) injection 1 mg    glucose chewable tablet 16 g    glucose chewable tablet 24 g    melatonin tablet 6 mg    methocarbamoL tablet 500 mg    naloxone 0.4 mg/mL injection 0.02 mg    nicotine 14 mg/24 hr 1 patch    ondansetron injection 4 mg    oxyCODONE immediate release tablet 5 mg    polyethylene glycol packet 17 g    prochlorperazine injection Soln 5 mg    ropivacaine 0.2% Perineural Pump infusion 500 ML    senna-docusate 8.6-50 mg per tablet 1 tablet    sodium chloride 0.9% flush 10 mL     Objective:     Vital Signs (Most Recent):  Temp: 98.4 °F (36.9 °C) (07/09/24 0756)  Pulse: 90 (07/09/24 0756)  Resp: 18 (07/09/24 0756)  BP: 117/64 (07/09/24 0756)  SpO2: 96 % (07/09/24 0846) Vital Signs (24h Range):  Temp:  [98.2 °F (36.8 °C)-98.8 °F (37.1 °C)] 98.4 °F (36.9 °C)  Pulse:  [] 90  Resp:  [14-20] 18  SpO2:  [93 %-100 %] 96 %  BP: (100-142)/(54-84) 117/64     Weight: 72.6 kg (160 lb)  Height: 5' 5" (165.1 cm)  Body mass index is 26.63 kg/m².      Intake/Output Summary (Last 24 hours) at 7/9/2024 0903  Last data filed at 7/8/2024 1800  Gross per 24 hour   Intake 1730 ml   Output 50 ml   Net 1680 ml        Ortho/SPM Exam     Gen: NAD, WDWN  CV: " peripherally well perfused  Resp: unlabored respirations, symmetric chest rise  Neck: TM  Neuro: CN 2-12 grossly intact. No FND.    MSK:  LLE:  MEME HASSAN, DP palpated  AROM of toes and ankle inact  Dressing c/d/I, T scope brace in place    Significant Labs:   All pertinent labs within the past 24 hours have been reviewed.    Significant Imaging: I have reviewed all pertinent imaging results/findings.

## 2024-07-09 NOTE — ASSESSMENT & PLAN NOTE
Catalina Abrams is a 48 y.o. female with PMH of tobacco use presenting with a left displaced patellar fracture that occurred after a fall onto the left knee yesterday morning.  Closed, neurovascularly intact.      S/p ORIF L patella 7/8/24      ASA 81 mg BID x 8 wks postop for DVT prophylaxis  WBAT LLE w/ hinged knee brace locked in extension and brace on properly

## 2024-07-09 NOTE — PLAN OF CARE
Problem: Adult Inpatient Plan of Care  Goal: Plan of Care Review  Outcome: Progressing  Goal: Patient-Specific Goal (Individualized)  Outcome: Progressing  Goal: Absence of Hospital-Acquired Illness or Injury  Outcome: Progressing  Goal: Optimal Comfort and Wellbeing  Outcome: Progressing  Goal: Readiness for Transition of Care  Outcome: Progressing     Problem: Wound  Goal: Optimal Coping  Outcome: Progressing  Goal: Optimal Functional Ability  Outcome: Progressing  Goal: Absence of Infection Signs and Symptoms  Outcome: Progressing  Goal: Improved Oral Intake  Outcome: Progressing  Goal: Optimal Pain Control and Function  Outcome: Progressing  Goal: Skin Health and Integrity  Outcome: Progressing  Goal: Optimal Wound Healing  Outcome: Progressing     Problem: Pain Acute  Goal: Optimal Pain Control and Function  Outcome: Progressing   Pt aaox4, pt rested comfortably throughout the day, pt pain managed with scheduled and prn meds, VSS, no signs of distress noted throughout shift.  present at the bedside. Bed in the lowest position  with wheels locked, call light and personal belongings within reach. Continue plan of care.

## 2024-07-09 NOTE — NURSING
Nurses Note -- 4 Eyes      7/9/2024   11:43 AM      Skin assessed during: Daily Assessment      [x] No Altered Skin Integrity Present    []Prevention Measures Documented      [] Yes- Altered Skin Integrity Present or Discovered   [] LDA Added if Not in Epic (Describe Wound)   [] New Altered Skin Integrity was Present on Admit and Documented in LDA   [] Wound Image Taken    Wound Care Consulted? No    Attending Nurse:  JOSE Kirby    Second RN/Staff Member:  Annie Blackburn RN

## 2024-07-09 NOTE — ASSESSMENT & PLAN NOTE
She reports no issues with withdrawal and declined nicotine patch  Counselled on cessation    Doing well without cigarettes since admit and plans to try to stay off them on discharge

## 2024-07-09 NOTE — PT/OT/SLP RE-EVAL
Physical Therapy Re-evaluation and Treatment    OT present for coeval due to pt's multiple medical comorbidities and functional/cognition deficits requiring two skilled therapists to appropriately progress pt's musculoskeletal strength, neuromuscular control, and endurance while taking into consideration medical acuity and pt safety.    Patient Name:  Catalina Abrams   MRN:  [id number]    Recommendations:     Discharge Recommendations: Low Intensity Therapy  Discharge Equipment Recommendations: walker, rolling   Barriers to discharge: None    Assessment:     Catalina Abrams is a 48 y.o. female admitted with a medical diagnosis of Closed fracture of left patella.  She presents with the following impairments/functional limitations: weakness, impaired endurance, impaired functional mobility, impaired balance, gait instability, pain, decreased lower extremity function, orthopedic precautions     Pt receptive and tolerated PT co-eval with OT well. Pt re-educated on WBAT: left lower extremity in HKB locked in extension precautions prior to start of treatment with pt verbalizing understanding. Pt able to amb to bathroom with RW and SBA this session. Gait distance limited due to increased pain with amb. Patient currently demonstrates a need for low intensity therapy on a scheduled basis secondary to a decline in functional status due to surgical procedure    Rehab Prognosis:  Good; patient would benefit from acute skilled PT services to address these deficits and reach maximum level of function.      Recent Surgery: Procedure(s) (LRB):  ORIF, FRACTURE, PATELLA - LEFT (Left) 1 Day Post-Op    Plan:     During this hospitalization, patient to be seen 4 x/week to address the above listed problems via gait training, therapeutic activities, therapeutic exercises, neuromuscular re-education  Plan of Care Expires:  08/08/24  Plan of Care Reviewed with: patient    Subjective     Communicated with RN prior to session.  Patient found HOB  elevated with  (hindged knee brace) upon PT entry to room, agreeable to evaluation.      Chief Complaint: LLE pain with amb  Patient comments/goals: Pt wanted to brush her teeth  Pain/Comfort:  Pain Rating 1:  (did not rate)  Location - Side 1: Left  Location - Orientation 1: generalized  Location 1: leg  Pain Addressed 1: Reposition, Distraction, Cessation of Activity  Pain Rating Post-Intervention 1:  (increased pain did not rate)    Patients cultural, spiritual, Buddhism conflicts given the current situation: no      Objective:     Patient found with:  (hindged knee brace)     General Precautions: Standard, fall  Orthopedic Precautions: LLE weight bearing as tolerated  Braces: Hinged knee brace  Respiratory Status: Room air    Exams:  Gross Motor Coordination:  WFL  Sensation:    -       Intact  RLE ROM: WFL  RLE Strength: WFL  LLE ROM: WFL except knee ROM not tested  LLE Strength: WFL except knee strength not tested    Functional Mobility:    Bed Mobility:   Supine > Sit: contact guard assistance  Scooting: contact guard assistance    Transfers:   Sit <> Stand Transfer: contact guard assistance from EOB using rolling walker     Balance:   Sitting balance: FAIR+: Maintains balance through MINIMAL excursions of active trunk motion  Standing balance:   FAIR: Maintains without assist but unable to take challenges  Pt able to stand at sink with RW and Supervision to perform ADLs with OT  FAIR: Needs CONTACT GUARD during gait                 Gait:  Distance: 10 ft to bathroom + 4 ft to chair   Assistive Device: RW  Assistance Level: stand by assistance  Gait Assessment: Pt amb with decreased judie and decreased step length using step to gait pattern.  Gait distance limited due to c/o increased LLE pain       AM-PAC 6 CLICK MOBILITY  Total Score:19       Treatment and Education:   Pt educated on tip to reduce fall risk and safety with mobility and using call button for assistance from nursing staff with OOB  mobility.  Pt educated on sitting up in chair throughout most of day  All questions answered within the scope of PT.  White board updated accordingly.    Patient left up in chair with all lines intact, call button in reach, and RN notified.    GOALS:   Multidisciplinary Problems       Physical Therapy Goals          Problem: Physical Therapy    Goal Priority Disciplines Outcome Goal Variances Interventions   Physical Therapy Goal     PT, PT/OT Progressing     Description: Goals to be met by: 24     Patient will increase functional independence with mobility by performin. Supine to sit with Set-up [place]  2. Sit to stand transfer with Modified [place]  3. Bed to chair transfer with Modified [place] using Rolling Walker  4. Gait  x 150 feet with Modified [place] using Rolling Walker.     DME Recommendation  Patient demonstrates a mobility limitation that significantly impairs their ability to participate in one or more mobility related activities of daily living. Patient's mobility limitation cannot be sufficiently resolved with the use of a cane, but can be sufficiently resolved with the use of a rolling walker.The use of a rolling walker will considerably improve their ability to participate in MRADLs. Patient will use the walker on a regular basis at home.                            History:     Past Medical History:   Diagnosis Date    Known health problems: none        Past Surgical History:   Procedure Laterality Date    APPENDECTOMY       SECTION      HYSTEROSCOPY WITH DILATION AND CURETTAGE OF UTERUS N/A 8/15/2018    Procedure: HYSTEROSCOPY, WITH DILATION AND CURETTAGE OF UTERUS;  Surgeon: Elder Salazar MD;  Location: [facility] OR;  Service: OB/GYN;  Laterality: N/A;    OPEN REDUCTION AND INTERNAL FIXATION (ORIF) OF FRACTURE OF PATELLA Left 2024    Procedure: ORIF, FRACTURE, PATELLA - LEFT;  Surgeon: Tres Mullins MD;  [facility];   Service: Orthopedics;  Laterality: Left;  C-arm clockside, Ancef    THERMAL ABLATION OF ENDOMETRIUM N/A 8/15/2018    Procedure: ABLATION, ENDOMETRIUM, THERMAL;  Surgeon: Elder Salazar MD;  Location: [facility] OR;  Service: OB/GYN;  Laterality: N/A;    TUBAL LIGATION  1998       Time Tracking:     PT Received On: 07/09/24  PT Start Time: 0855     PT Stop Time: 0916  PT Total Time (min): 21 min     Billable Minutes: Re-eval 10 and Gait Training 11      07/09/2024

## 2024-07-09 NOTE — NURSING
Nurses Note -- 4 Eyes      7/8/2024   9:32 PM      Skin assessed during: Q Shift Change      [x] No Altered Skin Integrity Present    []Prevention Measures Documented      [] Yes- Altered Skin Integrity Present or Discovered   [] LDA Added if Not in Epic (Describe Wound)   [] New Altered Skin Integrity was Present on Admit and Documented in LDA   [] Wound Image Taken    Wound Care Consulted? No    Attending Nurse:  JOSE Valencia     Second RN/Staff Member:  Cholo GARCIA

## 2024-07-09 NOTE — PLAN OF CARE
CHW met with patient/family at bedside. Patient experience rounding completed and reviewed the following.     Do you know your discharge plan? Yes       If yes, what is the plan?Home     Have you discussed your needs and preferences with your SW/CM? Yes     If you are discharging home, do you have help at home? Yes     Do you think you will need help additional at home at discharge?  No     Do you currently have difficulty keeping up with bills, affording medicine or buying food?  No    Assigned SW/CM notified of any patient/family needs or concerns. Appropriate resources provided to address patient's needs.      CM: Connie Lewis

## 2024-07-09 NOTE — ASSESSMENT & PLAN NOTE
- s/p mechanical fall [place] [employer] she works at due to standing water with pain and difficulty walking. Transferred here for ortho from [facility] due to patela fx with sever comminution seen on imaging  -initially ortho reported plan for outpatient surgery repair but ended up taking to OR 7/8 AM for ORIF patella. Plan for ASA BID x 8 weeks- end date September 3rd, wbat in hinged knee brace locked in extension  -pain control not controlled pre op 7/8 overnight with adjustments made and now anesthesia managing post op with ropivicaine and multimodals in place. Some increased pain with therapy session 7/9 and working for pain control as calmed down now in chair post session. Will adjust discharge med rec once closer to discharge as previously med-rec-ed with norco before adjustments and plan to discharge with different regimen now   -progerssive mobility plan with hinged knee brace per ortho op note (See image copied and pasted into op note for details by week) and gave her copy of this .

## 2024-07-09 NOTE — PROGRESS NOTES
[facility] - [facility]  Orthopedics  Progress Note    Patient Name: Catalina Abrams  MRN: [id number]  Admission Date: 7/6/2024  Hospital Length of Stay: 1 days  Attending Provider: Jennifer Chilel MD  Primary Care Provider: Vicky Antonio NP  Follow-up For: Procedure(s) (LRB):  ORIF, FRACTURE, PATELLA - LEFT (Left)    Post-Operative Day: 1 Day Post-Op  Subjective:     Principal Problem:Closed fracture of left patella    Principal Orthopedic Problem: same, s/p ORIF left patella 7/8/24    Interval History: Pt seen and examined at bedside. NAEON, VSS, AF. Pain controlled. Denies fevers, chills, chest pain, SOB, N/V/D.   Pending PT eval.     Review of patient's allergies indicates:  No Known Allergies    Current Facility-Administered Medications   Medication    acetaminophen tablet 1,000 mg    albuterol-ipratropium 2.5 mg-0.5 mg/3 mL nebulizer solution 3 mL    aspirin EC tablet 81 mg    celecoxib capsule 200 mg    dextrose 10% bolus 125 mL 125 mL    dextrose 10% bolus 125 mL 125 mL    dextrose 10% bolus 250 mL 250 mL    dextrose 10% bolus 250 mL 250 mL    glucagon (human recombinant) injection 1 mg    glucagon (human recombinant) injection 1 mg    glucose chewable tablet 16 g    glucose chewable tablet 24 g    melatonin tablet 6 mg    methocarbamoL tablet 500 mg    naloxone 0.4 mg/mL injection 0.02 mg    nicotine 14 mg/24 hr 1 patch    ondansetron injection 4 mg    oxyCODONE immediate release tablet 5 mg    polyethylene glycol packet 17 g    prochlorperazine injection Soln 5 mg    ropivacaine 0.2% Perineural Pump infusion 500 ML    senna-docusate 8.6-50 mg per tablet 1 tablet    sodium chloride 0.9% flush 10 mL     Objective:     Vital Signs (Most Recent):  Temp: 98.4 °F (36.9 °C) (07/09/24 0756)  Pulse: 90 (07/09/24 0756)  Resp: 18 (07/09/24 0756)  BP: 117/64 (07/09/24 0756)  SpO2: 96 % (07/09/24 0846) Vital Signs (24h Range):  Temp:  [98.2 °F (36.8 °C)-98.8 °F (37.1 °C)] 98.4 °F (36.9 °C)  Pulse:  []  "90  Resp:  [14-20] 18  SpO2:  [93 %-100 %] 96 %  BP: (100-142)/(54-84) 117/64     Weight: 72.6 kg (160 lb)  Height: 5' 5" (165.1 cm)  Body mass index is 26.63 kg/m².      Intake/Output Summary (Last 24 hours) at 7/9/2024 0903  Last data filed at 7/8/2024 1800  Gross per 24 hour   Intake 1730 ml   Output 50 ml   Net 1680 ml        Ortho/SPM Exam     Gen: NAD, WDWN  CV: peripherally well perfused  Resp: unlabored respirations, symmetric chest rise  Neck: TM  Neuro: CN 2-12 grossly intact. No FND.    MSK:  LLE:  SILT  WWP, DP palpated  AROM of toes and ankle inact  Dressing c/d/I, T scope brace in place    Significant Labs:   All pertinent labs within the past 24 hours have been reviewed.    Significant Imaging: I have reviewed all pertinent imaging results/findings.  Assessment/Plan:     * Closed fracture of left patella  Catalina Abrams is a 48 y.o. female with PMH of tobacco use presenting with a left displaced patellar fracture that occurred after a fall onto the left knee yesterday morning.  Closed, neurovascularly intact.      S/p ORIF L patella 7/8/24      ASA 81 mg BID x 8 wks postop for DVT prophylaxis  WBAT LLE w/ hinged knee brace locked in extension and brace on properly            Joseph Myas MD  Orthopedics  [facility] - Surgery    "

## 2024-07-09 NOTE — PT/OT/SLP EVAL
Occupational Therapy   Evaluation  Co-evaluation/treatment performed due to patient's multiple deficits requiring two skilled therapists to appropriately and safely assess patient's strength and endurance while facilitating functional tasks in addition to accommodating for patient's activity tolerance.     Name: Catalina Abrams  MRN: [id number]  Admitting Diagnosis: Closed fracture of left patella  Recent Surgery: Procedure(s) (LRB):  ORIF, FRACTURE, PATELLA - LEFT (Left) 1 Day Post-Op    Recommendations:     Discharge Recommendations: Low Intensity Therapy  Discharge Equipment Recommendations:  walker, rolling  Barriers to discharge:  None    Assessment:     Catalina Abrams is a 48 y.o. female with a medical diagnosis of Closed fracture of left patella.  She presents with good participation and motivation. Pt is limited by pain in the LLE, but able to participate with SPV-SBA for functional tasks. The session was terminated 2/2 pain at surgical site. Pt positioned in bedside chair with BLE elevated into extension. Pt remains limited in ADLs, functional mobility, and functional transfers and is currently not performing tasks at PLOF. Pt would continue to benefit from skilled OT services to maximize functional independence with ADLs and functional mobility, reduce caregiver burden, and facilitate safe discharge in the least restrictive environment. Performance deficits affecting function: weakness, impaired endurance, pain, impaired functional mobility, gait instability, decreased lower extremity function, impaired balance.      Rehab Prognosis: Good; patient would benefit from acute skilled OT services to address these deficits and reach maximum level of function.       Plan:     Patient to be seen 4 x/week to address the above listed problems via self-care/home management, therapeutic activities, therapeutic exercises  Plan of Care Expires: 08/09/24  Plan of Care Reviewed with: patient    Subjective     Chief  Complaint: L knee pain  Patient/Family Comments/goals: I feel like I accomplished something today    Occupational Profile:  Living Environment: Lives alone in [facility], 0STE, t/s combo  Previous level of function: I with all ADLs and mobility  Roles and Routines: Fiancee  Equipment Used at Home: none  Assistance upon Discharge: Ken Valadez    Pain/Comfort:  Pain Rating 1:  (unrated)  Location - Side 1: Left  Location - Orientation 1: generalized  Location 1: leg  Pain Addressed 1: Reposition, Distraction, Pre-medicate for activity, Cessation of Activity  Pain Rating Post-Intervention 1:  (not rated)    Patients cultural, spiritual, Congregational conflicts given the current situation: no    Objective:     Communicated with: RN prior to session.  Patient found HOB elevated with knee immobilizer upon OT entry to room.    General Precautions: Standard, fall  Orthopedic Precautions: LUE weight bearing as tolerated  Braces: Knee immobilizer  Respiratory Status: Room air    Occupational Performance:    Bed Mobility:  LLE guarded/supported during t/f to EOB  Patient completed Scooting/Bridging with contact guard assistance  Patient completed Supine to Sit with contact guard assistance  Patient completed Sit to Supine with contact guard assistance    Functional Mobility/Transfers:  Patient completed Sit <> Stand Transfer with contact guard assistance  with  rolling walker   Patient completed Bed <> Chair Transfer using Step Transfer technique with contact guard assistance with rolling walker  Functional Mobility: Pt engaged in functional mobility to simulate household/community distances 15' with SBA and RW in order to maximize functional endurance and standing balance required for engagement in occupations of choice      Activities of Daily Living:  Grooming: supervision - oral/facial hygiene standing at sink ~5m  Upper Body Dressing: Set up - don gown posteriorly    Cognitive/Visual Perceptual:  Cognitive/Psychosocial Skills:      -       Oriented to: Person, Place, Time, and Situation   -       Follows Commands/attention:Follows multistep  commands  -       Communication: clear/fluent  -       Memory: No Deficits noted  -       Safety awareness/insight to disability: intact     Physical Exam:  Upper Extremity Range of Motion:     -       Right Upper Extremity: WFL  -       Left Upper Extremity: WFL  Upper Extremity Strength:    -       Right Upper Extremity: WFL  -       Left Upper Extremity: WFL   Strength:    -       Right Upper Extremity: WFL  -       Left Upper Extremity: WFL    AMPAC 6 Click ADL:  AMPAC Total Score: 19    Treatment & Education:  -Education on energy conservation and task modification to maximize safety and (I) during ADLs and mobility  -Education on importance of OOB activity to improve overall activity tolerance and promote recovery  -Pt educated to call for assistance and to transfer with hospital staff only  -Provided education regarding role of OT, POC, & discharge recommendations with pt verbalizing understanding.  Pt had no further questions & when asked whether there were any concerns pt reported none.      Patient left up in chair with all lines intact and call button in reach    GOALS:   Multidisciplinary Problems       Occupational Therapy Goals          Problem: Occupational Therapy    Goal Priority Disciplines Outcome Interventions   Occupational Therapy Goal     OT, PT/OT Progressing    Description: Goals to be met by: 8/9/24     Patient will increase functional independence with ADLs by performing:    UE Dressing with [place].  LE Dressing with Moderate Assistance.  Grooming while standing at sink with [place].  Supine to sit with [place].  Toilet transfer to toilet with Modified [place].                         History:     Past Medical History:   Diagnosis Date    Known health problems: none          Past Surgical History:   Procedure Laterality Date    APPENDECTOMY        SECTION      HYSTEROSCOPY WITH DILATION AND CURETTAGE OF UTERUS N/A 8/15/2018    Procedure: HYSTEROSCOPY, WITH DILATION AND CURETTAGE OF UTERUS;  Surgeon: Elder Salazar MD;  Location: [facility] OR;  Service: OB/GYN;  Laterality: N/A;    OPEN REDUCTION AND INTERNAL FIXATION (ORIF) OF FRACTURE OF PATELLA Left 2024    Procedure: ORIF, FRACTURE, PATELLA - LEFT;  Surgeon: Tres Mullins MD;  Location: [address];  Service: Orthopedics;  Laterality: Left;  C-arm clockside, Ancef    THERMAL ABLATION OF ENDOMETRIUM N/A 8/15/2018    Procedure: ABLATION, ENDOMETRIUM, THERMAL;  Surgeon: Elder Salazar MD;  Location: [facility] OR;  Service: OB/GYN;  Laterality: N/A;    TUBAL LIGATION         Time Tracking:     OT Date of Treatment: 24  OT Start Time: 856  OT Stop Time: 914  OT Total Time (min): 18 min    Billable Minutes:Evaluation 8  Self Care/Home Management 10    2024

## 2024-07-09 NOTE — PLAN OF CARE
Problem: Adult Inpatient Plan of Care  Goal: Plan of Care Review  Outcome: Progressing  Goal: Patient-Specific Goal (Individualized)  Outcome: Progressing  Goal: Absence of Hospital-Acquired Illness or Injury  Outcome: Progressing  Goal: Optimal Comfort and Wellbeing  Outcome: Progressing  Goal: Readiness for Transition of Care  Outcome: Progressing     Problem: Wound  Goal: Optimal Coping  Outcome: Progressing  Goal: Optimal Functional Ability  Outcome: Progressing  Goal: Absence of Infection Signs and Symptoms  Outcome: Progressing  Goal: Improved Oral Intake  Outcome: Progressing  Goal: Optimal Pain Control and Function  Outcome: Progressing  Goal: Skin Health and Integrity  Outcome: Progressing  Goal: Optimal Wound Healing  Outcome: Progressing    Received pt from [facility] earlier, IVFS infusing without difficulty, see mar for pain med admin, IV site wnl, vss, no distress, DSG, cast padding C/D/I , knee immobilizer intact, pt trying to void at present . Next shift notified

## 2024-07-10 VITALS
HEART RATE: 82 BPM | HEIGHT: 65 IN | WEIGHT: 160 LBS | BODY MASS INDEX: 26.66 KG/M2 | SYSTOLIC BLOOD PRESSURE: 121 MMHG | DIASTOLIC BLOOD PRESSURE: 67 MMHG | OXYGEN SATURATION: 97 % | TEMPERATURE: 98 F | RESPIRATION RATE: 16 BRPM

## 2024-07-10 PROCEDURE — 25000003 PHARM REV CODE 250

## 2024-07-10 PROCEDURE — 25000003 PHARM REV CODE 250: Performed by: HOSPITALIST

## 2024-07-10 PROCEDURE — A4216 STERILE WATER/SALINE, 10 ML: HCPCS | Performed by: HOSPITALIST

## 2024-07-10 PROCEDURE — 25000003 PHARM REV CODE 250: Performed by: NURSE PRACTITIONER

## 2024-07-10 RX ORDER — ACETAMINOPHEN 500 MG
1000 TABLET ORAL EVERY 8 HOURS
Status: DISCONTINUED | OUTPATIENT
Start: 2024-07-10 | End: 2024-07-10 | Stop reason: HOSPADM

## 2024-07-10 RX ORDER — METHOCARBAMOL 750 MG/1
750 TABLET, FILM COATED ORAL EVERY 8 HOURS
Status: DISCONTINUED | OUTPATIENT
Start: 2024-07-10 | End: 2024-07-10 | Stop reason: HOSPADM

## 2024-07-10 RX ADMIN — METHOCARBAMOL 500 MG: 500 TABLET ORAL at 06:07

## 2024-07-10 RX ADMIN — ACETAMINOPHEN 1000 MG: 500 TABLET ORAL at 06:07

## 2024-07-10 RX ADMIN — ASPIRIN 81 MG: 81 TABLET, COATED ORAL at 08:07

## 2024-07-10 RX ADMIN — OXYCODONE 5 MG: 5 TABLET ORAL at 04:07

## 2024-07-10 RX ADMIN — Medication 10 ML: at 06:07

## 2024-07-10 RX ADMIN — SENNOSIDES AND DOCUSATE SODIUM 1 TABLET: 50; 8.6 TABLET ORAL at 08:07

## 2024-07-10 RX ADMIN — POLYETHYLENE GLYCOL 3350 17 G: 17 POWDER, FOR SOLUTION ORAL at 08:07

## 2024-07-10 RX ADMIN — CELECOXIB 200 MG: 200 CAPSULE ORAL at 08:07

## 2024-07-10 NOTE — SUBJECTIVE & OBJECTIVE
"Principal Problem:Closed fracture of left patella    Principal Orthopedic Problem: same, s/p ORIF left patella 7/8/24    Interval History: Pt seen and examined at bedside. NAEON, VSS, AF. Pain controlled. Denies fevers, chills, chest pain, SOB, V/D. Some nausea, PO meds ordered.   Doing well with PT.     Review of patient's allergies indicates:  No Known Allergies    Current Facility-Administered Medications   Medication    acetaminophen tablet 1,000 mg    albuterol-ipratropium 2.5 mg-0.5 mg/3 mL nebulizer solution 3 mL    aspirin EC tablet 81 mg    celecoxib capsule 200 mg    dextrose 10% bolus 125 mL 125 mL    dextrose 10% bolus 125 mL 125 mL    dextrose 10% bolus 250 mL 250 mL    dextrose 10% bolus 250 mL 250 mL    glucagon (human recombinant) injection 1 mg    glucagon (human recombinant) injection 1 mg    glucose chewable tablet 16 g    glucose chewable tablet 24 g    melatonin tablet 6 mg    methocarbamoL tablet 750 mg    naloxone 0.4 mg/mL injection 0.02 mg    nicotine 14 mg/24 hr 1 patch    ondansetron injection 4 mg    oxyCODONE immediate release tablet 5 mg    polyethylene glycol packet 17 g    prochlorperazine injection Soln 5 mg    ropivacaine 0.2% Perineural Pump infusion 500 ML    senna-docusate 8.6-50 mg per tablet 1 tablet    sodium chloride 0.9% flush 10 mL     Objective:     Vital Signs (Most Recent):  Temp: 98.2 °F (36.8 °C) (07/10/24 0752)  Pulse: 82 (07/10/24 0752)  Resp: 16 (07/10/24 0752)  BP: 121/67 (07/10/24 0752)  SpO2: 97 % (07/10/24 0816) Vital Signs (24h Range):  Temp:  [96.9 °F (36.1 °C)-98.4 °F (36.9 °C)] 98.2 °F (36.8 °C)  Pulse:  [75-90] 82  Resp:  [16-18] 16  SpO2:  [96 %-97 %] 97 %  BP: (112-131)/(57-69) 121/67     Weight: 72.6 kg (160 lb)  Height: 5' 5" (165.1 cm)  Body mass index is 26.63 kg/m².    No intake or output data in the 24 hours ending 07/10/24 0940       Ortho/SPM Exam     Gen: NAD, WDWN  CV: peripherally well perfused  Resp: unlabored respirations, symmetric chest " rise  Neck: TM  Neuro: CN 2-12 grossly intact. No FND.    MSK:  LLE:  SILT  WWP, DP palpated  AROM of toes and ankle inact  Dressing c/d/I, T scope brace in place    Significant Labs:   All pertinent labs within the past 24 hours have been reviewed.    Significant Imaging: I have reviewed all pertinent imaging results/findings.

## 2024-07-10 NOTE — PLAN OF CARE
FROYLAN contacted [facility] (#[phone]) to follow up on pt's RW status. FROYLAN informed status remained pending, created an account for the patient. FROYLAN provided return contact # and Fax #. FROYLAN also resent clinicals to fax # ([phone]).  team to follow.    Destiny Hurtado LMSW  Case Management   [facility]   Ext. [phone]

## 2024-07-10 NOTE — PROGRESS NOTES
[facility]  Orthopedics  Progress Note    Patient Name: Catalina Abrams  MRN: [id number]  Admission Date: 7/6/2024  Hospital Length of Stay: 2 days  Attending Provider: Jennifer Chilel MD  Primary Care Provider: Vicky Antonio NP  Follow-up For: Procedure(s) (LRB):  ORIF, FRACTURE, PATELLA - LEFT (Left)    Post-Operative Day: 2 Days Post-Op  Subjective:     Principal Problem:Closed fracture of left patella    Principal Orthopedic Problem: same, s/p ORIF left patella 7/8/24    Interval History: Pt seen and examined at bedside. NAEON, VSS, AF. Pain controlled. Denies fevers, chills, chest pain, SOB, V/D. Some nausea, PO meds ordered.   Doing well with PT.     Review of patient's allergies indicates:  No Known Allergies    Current Facility-Administered Medications   Medication    acetaminophen tablet 1,000 mg    albuterol-ipratropium 2.5 mg-0.5 mg/3 mL nebulizer solution 3 mL    aspirin EC tablet 81 mg    celecoxib capsule 200 mg    dextrose 10% bolus 125 mL 125 mL    dextrose 10% bolus 125 mL 125 mL    dextrose 10% bolus 250 mL 250 mL    dextrose 10% bolus 250 mL 250 mL    glucagon (human recombinant) injection 1 mg    glucagon (human recombinant) injection 1 mg    glucose chewable tablet 16 g    glucose chewable tablet 24 g    melatonin tablet 6 mg    methocarbamoL tablet 750 mg    naloxone 0.4 mg/mL injection 0.02 mg    nicotine 14 mg/24 hr 1 patch    ondansetron injection 4 mg    oxyCODONE immediate release tablet 5 mg    polyethylene glycol packet 17 g    prochlorperazine injection Soln 5 mg    ropivacaine 0.2% Perineural Pump infusion 500 ML    senna-docusate 8.6-50 mg per tablet 1 tablet    sodium chloride 0.9% flush 10 mL     Objective:     Vital Signs (Most Recent):  Temp: 98.2 °F (36.8 °C) (07/10/24 0752)  Pulse: 82 (07/10/24 0752)  Resp: 16 (07/10/24 0752)  BP: 121/67 (07/10/24 0752)  SpO2: 97 % (07/10/24 0816) Vital Signs (24h Range):  Temp:  [96.9 °F (36.1 °C)-98.4 °F (36.9 °C)] 98.2 °F  "(36.8 °C)  Pulse:  [75-90] 82  Resp:  [16-18] 16  SpO2:  [96 %-97 %] 97 %  BP: (112-131)/(57-69) 121/67     Weight: 72.6 kg (160 lb)  Height: 5' 5" (165.1 cm)  Body mass index is 26.63 kg/m².    No intake or output data in the 24 hours ending 07/10/24 0940       Ortho/SPM Exam     Gen: NAD, WDWN  CV: peripherally well perfused  Resp: unlabored respirations, symmetric chest rise  Neck: TM  Neuro: CN 2-12 grossly intact. No FND.    MSK:  LLE:  SILT  WWP, DP palpated  AROM of toes and ankle inact  Dressing c/d/I, T scope brace in place    Significant Labs:   All pertinent labs within the past 24 hours have been reviewed.    Significant Imaging: I have reviewed all pertinent imaging results/findings.  Assessment/Plan:     * Closed fracture of left patella  Catalina Abrams is a 48 y.o. female with PMH of tobacco use presenting with a left displaced patellar fracture that occurred after a fall onto the left knee yesterday morning.  Closed, neurovascularly intact.      S/p ORIF L patella 7/8/24      ASA 81 mg BID x 8 wks postop for DVT prophylaxis  WBAT LLE w/ hinged knee brace locked in extension and brace on properly    Ok for discharge from orthopedic surgery standpoint            Joseph Mays MD  Orthopedics  [facility] - Surgery    "

## 2024-07-10 NOTE — NURSING
Nurses Note -- 4 Eyes      7/10/2024   12:06 AM      Skin assessed during: Q Shift Change      [x] No Altered Skin Integrity Present    []Prevention Measures Documented      [] Yes- Altered Skin Integrity Present or Discovered   [] LDA Added if Not in Epic (Describe Wound)   [] New Altered Skin Integrity was Present on Admit and Documented in LDA   [] Wound Image Taken    Wound Care Consulted? No    Attending Nurse:  JOSE Valencia     Second RN/Staff Member:  JOSE Kirby

## 2024-07-10 NOTE — ANESTHESIA POST-OP PAIN MANAGEMENT
Acute Pain Service Progress Note    Catalina Abrams is a 48 y.o., female, [id number].    Surgery:  ORIF, FRACTURE, PATELLA - LEFT     Post Op Day #: 2    Catheter type: perineural  femoral    Infusion type: Ropivacaine 0.2%  7mL/3hr basal and 5mL/30min demand    Problem List:    Active Hospital Problems    Diagnosis  POA    *Closed fracture of left patella [S82.002A]  Yes    Nicotine use [Z72.0]  Yes      Resolved Hospital Problems   No resolved problems to display.       Subjective:     General appearance of alert, oriented, no complaints   Pain with rest: 4    Numbers   Pain with movement: 6    Numbers   Side Effects    1. Pruritis No    2. Nausea No    3. Motor Blockade No, 0=Ability to raise lower extremities off bed    4. Sedation No, 1=awake and alert    Objective:       Catheter site clean, dry, intact         Vitals   Vitals:    07/10/24 0752   BP: 121/67   Pulse: 82   Resp: 16   Temp: 36.8 °C (98.2 °F)        Labs    Admission on 07/06/2024   Component Date Value Ref Range Status    WBC 07/06/2024 15.65 (H)  3.90 - 12.70 K/uL Final    RBC 07/06/2024 4.98  4.00 - 5.40 M/uL Final    Hemoglobin 07/06/2024 15.8  12.0 - 16.0 g/dL Final    Hematocrit 07/06/2024 48.0  37.0 - 48.5 % Final    MCV 07/06/2024 96  82 - 98 fL Final    MCH 07/06/2024 31.7 (H)  27.0 - 31.0 pg Final    MCHC 07/06/2024 32.9  32.0 - 36.0 g/dL Final    RDW 07/06/2024 12.9  11.5 - 14.5 % Final    Platelets 07/06/2024 256  150 - 450 K/uL Final    MPV 07/06/2024 12.7  9.2 - 12.9 fL Final    Immature Granulocytes 07/06/2024 0.5  0.0 - 0.5 % Final    Gran # (ANC) 07/06/2024 11.9 (H)  1.8 - 7.7 K/uL Final    Immature Grans (Abs) 07/06/2024 0.08 (H)  0.00 - 0.04 K/uL Final    Lymph # 07/06/2024 2.6  1.0 - 4.8 K/uL Final    Mono # 07/06/2024 0.9  0.3 - 1.0 K/uL Final    Eos # 07/06/2024 0.1  0.0 - 0.5 K/uL Final    Baso # 07/06/2024 0.06  0.00 - 0.20 K/uL Final    nRBC 07/06/2024 0  0 /100 WBC Final    Gran % 07/06/2024 75.9 (H)  38.0 - 73.0 % Final     Lymph % 07/06/2024 16.9 (L)  18.0 - 48.0 % Final    Mono % 07/06/2024 5.6  4.0 - 15.0 % Final    Eosinophil % 07/06/2024 0.7  0.0 - 8.0 % Final    Basophil % 07/06/2024 0.4  0.0 - 1.9 % Final    Differential Method 07/06/2024 Automated   Final    Sodium 07/06/2024 139  136 - 145 mmol/L Final    Potassium 07/06/2024 3.9  3.5 - 5.1 mmol/L Final    Chloride 07/06/2024 107  95 - 110 mmol/L Final    CO2 07/06/2024 21 (L)  23 - 29 mmol/L Final    Glucose 07/06/2024 132 (H)  70 - 110 mg/dL Final    BUN 07/06/2024 12  6 - 20 mg/dL Final    Creatinine 07/06/2024 0.8  0.5 - 1.4 mg/dL Final    Calcium 07/06/2024 10.1  8.7 - 10.5 mg/dL Final    Total Protein 07/06/2024 7.6  6.0 - 8.4 g/dL Final    Albumin 07/06/2024 4.2  3.5 - 5.2 g/dL Final    Total Bilirubin 07/06/2024 0.7  0.1 - 1.0 mg/dL Final    Alkaline Phosphatase 07/06/2024 83  55 - 135 U/L Final    AST 07/06/2024 16  10 - 40 U/L Final    ALT 07/06/2024 19  10 - 44 U/L Final    eGFR 07/06/2024 >60.0  >60 mL/min/1.73 m^2 Final    Anion Gap 07/06/2024 11  8 - 16 mmol/L Final    Prothrombin Time 07/06/2024 10.9  9.0 - 12.5 sec Final    INR 07/06/2024 1.0  0.8 - 1.2 Final    aPTT 07/06/2024 23.6  21.0 - 32.0 sec Final    Group & Rh 07/06/2024 A POS   Final    Indirect Raphael 07/06/2024 NEG   Final    Specimen Outdate 07/06/2024 07/09/2024 23:59   Final    QRS Duration 07/06/2024 80  ms Final    OHS QTC Calculation 07/06/2024 442  ms Final    Transferrin 07/06/2024 325  200 - 375 mg/dL Final    Phosphorus 07/06/2024 2.5 (L)  2.7 - 4.5 mg/dL Final    Magnesium 07/06/2024 2.3  1.6 - 2.6 mg/dL Final    Hemoglobin A1C 07/06/2024 5.0  4.0 - 5.6 % Final    Estimated Avg Glucose 07/06/2024 97  68 - 131 mg/dL Final    Vit D, 25-Hydroxy 07/06/2024 41  30 - 96 ng/mL Final    Prealbumin 07/06/2024 27  20 - 43 mg/dL Final    WBC 07/07/2024 10.63  3.90 - 12.70 K/uL Final    RBC 07/07/2024 4.37  4.00 - 5.40 M/uL Final    Hemoglobin 07/07/2024 13.7  12.0 - 16.0 g/dL Final    Hematocrit  07/07/2024 43.0  37.0 - 48.5 % Final    MCV 07/07/2024 98  82 - 98 fL Final    MCH 07/07/2024 31.4 (H)  27.0 - 31.0 pg Final    MCHC 07/07/2024 31.9 (L)  32.0 - 36.0 g/dL Final    RDW 07/07/2024 12.9  11.5 - 14.5 % Final    Platelets 07/07/2024 215  150 - 450 K/uL Final    MPV 07/07/2024 12.1  9.2 - 12.9 fL Final    Immature Granulocytes 07/07/2024 0.3  0.0 - 0.5 % Final    Gran # (ANC) 07/07/2024 6.9  1.8 - 7.7 K/uL Final    Immature Grans (Abs) 07/07/2024 0.03  0.00 - 0.04 K/uL Final    Lymph # 07/07/2024 2.8  1.0 - 4.8 K/uL Final    Mono # 07/07/2024 0.8  0.3 - 1.0 K/uL Final    Eos # 07/07/2024 0.1  0.0 - 0.5 K/uL Final    Baso # 07/07/2024 0.03  0.00 - 0.20 K/uL Final    nRBC 07/07/2024 0  0 /100 WBC Final    Gran % 07/07/2024 64.5  38.0 - 73.0 % Final    Lymph % 07/07/2024 26.4  18.0 - 48.0 % Final    Mono % 07/07/2024 7.6  4.0 - 15.0 % Final    Eosinophil % 07/07/2024 0.9  0.0 - 8.0 % Final    Basophil % 07/07/2024 0.3  0.0 - 1.9 % Final    Differential Method 07/07/2024 Automated   Final    Sodium 07/07/2024 137  136 - 145 mmol/L Final    Potassium 07/07/2024 4.0  3.5 - 5.1 mmol/L Final    Chloride 07/07/2024 105  95 - 110 mmol/L Final    CO2 07/07/2024 22 (L)  23 - 29 mmol/L Final    Glucose 07/07/2024 114 (H)  70 - 110 mg/dL Final    BUN 07/07/2024 11  6 - 20 mg/dL Final    Creatinine 07/07/2024 0.8  0.5 - 1.4 mg/dL Final    Calcium 07/07/2024 9.6  8.7 - 10.5 mg/dL Final    Anion Gap 07/07/2024 10  8 - 16 mmol/L Final    eGFR 07/07/2024 >60.0  >60 mL/min/1.73 m^2 Final        Meds   Current Facility-Administered Medications   Medication Dose Route Frequency Provider Last Rate Last Admin    acetaminophen tablet 1,000 mg  1,000 mg Oral Q8H Juve Jackson DO        albuterol-ipratropium 2.5 mg-0.5 mg/3 mL nebulizer solution 3 mL  3 mL Nebulization Q6H PRN Jennifer Chilel MD        aspirin EC tablet 81 mg  81 mg Oral BID Jennifer Chilel MD   81 mg at 07/10/24 0814    celecoxib capsule 200 mg  200 mg  Oral Daily Juve Jackson DO   200 mg at 07/10/24 0814    dextrose 10% bolus 125 mL 125 mL  12.5 g Intravenous PRN Jennifer Chilel MD        dextrose 10% bolus 125 mL 125 mL  12.5 g Intravenous PRN Mitch Last MD        dextrose 10% bolus 250 mL 250 mL  25 g Intravenous PRN Jennifer Chilel MD        dextrose 10% bolus 250 mL 250 mL  25 g Intravenous PRN Mitch Last MD        glucagon (human recombinant) injection 1 mg  1 mg Intramuscular PRJennifer Valverde MD        glucagon (human recombinant) injection 1 mg  1 mg Intramuscular PRN Mitch Last MD        glucose chewable tablet 16 g  16 g Oral Jennifer Mariee MD        glucose chewable tablet 24 g  24 g Oral PRJennifer Valverde MD        melatonin tablet 6 mg  6 mg Oral Nightly PRJennifer Valverde MD   6 mg at 07/09/24 2308    methocarbamoL tablet 750 mg  750 mg Oral Q8H Juve Jackson DO        naloxone 0.4 mg/mL injection 0.02 mg  0.02 mg Intravenous PRN Jennifer Chilel MD        nicotine 14 mg/24 hr 1 patch  1 patch Transdermal Daily Jennifer Chilel MD        ondansetron injection 4 mg  4 mg Intravenous Q8H PRN Jennifer Chilel MD   4 mg at 07/07/24 2040    oxyCODONE immediate release tablet 5 mg  5 mg Oral Q4H PRN Juve Jackson DO   5 mg at 07/10/24 0408    polyethylene glycol packet 17 g  17 g Oral Daily Jennifer Chilel MD   17 g at 07/10/24 0814    prochlorperazine injection Soln 5 mg  5 mg Intravenous Q6H PRJennifer Valverde MD        ropivacaine 0.2% Perineural Pump infusion 500 ML   Perineural Continuous Dilan Crespo MD   New Bag at 07/08/24 1106    senna-docusate 8.6-50 mg per tablet 1 tablet  1 tablet Oral BID Queenie Ravi NP   1 tablet at 07/10/24 0814    sodium chloride 0.9% flush 10 mL  10 mL Intravenous Q8H Jennifer Chilel MD   10 mL at 07/10/24 0600         Assessment:    Pain control adequate. She reports the knee immobilizer was very irritating but with  improved cushioning, it is more tolerable. She reports she is ready to go home.    Plan:    Continue femoral PNC. We will provide teaching and discharge her with CADD pump to continue use until POD 5 on 7/13/24. Patient educated on risk of quadriceps weakness with PNC. She also will discharge home with knee immobilizer.  Continue multimodal pain regimen with scheduled acetaminophen 1g q6h, celecoxib 200mg qd, and methocarbamol 500mg q6h.   Continue PRN oxycodone 5mg q4h.       Thank you for your consult. We will follow up with the patient. Please call [phone] with any questions.    Michael Jackson, DO  PGY-4/CA-3

## 2024-07-10 NOTE — NURSING
Nurses Note -- 4 Eyes      7/10/2024   7:18 AM      Skin assessed during: Q Shift Change      [x] No Altered Skin Integrity Present    []Prevention Measures Documented      [] Yes- Altered Skin Integrity Present or Discovered   [] LDA Added if Not in Epic (Describe Wound)   [] New Altered Skin Integrity was Present on Admit and Documented in LDA   [] Wound Image Taken    Wound Care Consulted? No    Attending Nurse:  JOSE Kirby    Second RN/Staff Member:  Annie Blackburn RN

## 2024-07-10 NOTE — PLAN OF CARE
Problem: Adult Inpatient Plan of Care  Goal: Plan of Care Review  Outcome: Met  Goal: Patient-Specific Goal (Individualized)  Outcome: Met  Goal: Absence of Hospital-Acquired Illness or Injury  Outcome: Met  Goal: Optimal Comfort and Wellbeing  Outcome: Met  Goal: Readiness for Transition of Care  Outcome: Met     Problem: Wound  Goal: Optimal Coping  Outcome: Met  Goal: Optimal Functional Ability  Outcome: Met  Goal: Absence of Infection Signs and Symptoms  Outcome: Met  Goal: Improved Oral Intake  Outcome: Met  Goal: Optimal Pain Control and Function  Outcome: Met  Goal: Skin Health and Integrity  Outcome: Met  Goal: Optimal Wound Healing  Outcome: Met     Problem: Pain Acute  Goal: Optimal Pain Control and Function  Outcome: Met   Patient discharging home with rolling walker. Rolling walker will be delivered to home per case management. Discharge instructions have been reviewed in detail with patient and spouse, allowed time for questions, all questions answered, IV removed, gauze and tape applied, bleeding controlled, discharge medications have been delivered to bedside, awaiting transportation .

## 2024-07-10 NOTE — DISCHARGE SUMMARY
DISCHARGE SUMMARY  Hospital Medicine    Team: [facility] MED K    Patient Name: Catalina Abrams  YOB: 1975    Admit Date: 7/6/2024    Discharge Date: 07/10/2024    Discharge Attending Physician: Jennifer Chilel MD     Principal Diagnoses:  Active Hospital Problems    Diagnosis  POA    *Closed fracture of left patella [S82.002A]  Yes    Nicotine use [Z72.0]  Yes      Resolved Hospital Problems   No resolved problems to display.       Discharged Condition:  improved    Interval history- she reports feeling well and pain controlled and ready for discharge today. Anesthesia reports doing well and planning to dc with CADD pump. Aware of weight bearing recs as printed op note for her with post op plans for weight bearing on it. Okay while sitting to loosen straps but do not remove the HKB ever and dont mess with dial on side I told her ever when asked about this.    Temp:  [96.9 °F (36.1 °C)-98.4 °F (36.9 °C)]   Pulse:  [75-83]   Resp:  [16-18]   BP: (112-131)/(57-69)   SpO2:  [96 %-97 %]      Physical Exam  Constitutional:       Appearance: Normal appearance.   HENT:      Head: Normocephalic and atraumatic.      Mouth/Throat:      Mouth: Mucous membranes are moist.   Cardiovascular:      Rate and Rhythm: Normal rate and regular rhythm.      Pulses: Normal pulses.      Heart sounds: Normal heart sounds.   Pulmonary:      Effort: Pulmonary effort is normal.      Breath sounds: Normal breath sounds. No rales.   Abdominal:      General: Abdomen is flat. Bowel sounds are normal. There is no distension.      Palpations: Abdomen is soft.      Tenderness: There is no abdominal tenderness.   Musculoskeletal:         General: Normal range of motion.      Cervical back: Normal range of motion and neck supple.      Comments: Hinged knee brace locked in extension to LLE with ace bandage throughout knee. Ropivicaine in place.   Skin:     General: Skin is warm and dry.   Neurological:      General: No focal deficit  present.      Mental Status: She is alert and oriented to person, place, and time.   Psychiatric:         Mood and Affect: Mood normal.     HOSPITAL COURSE:      Initial Presentation:    49 yo F who presented to [facility] as a transfer for L comminuted patellar fracture after a ground level fall for ortho evaluation. She is neurovascularly intact and otherwise w/o any other issues. Ortho evaluated her in the ED and recommended admission for PT and likely ORIF after her swelling goes down within the next few days. She c/o pain and swelling at the L knee . She denies fever, CP, SOB, N/V/D     Course of Principle Problem for Admission:    Closed fracture of left patella  - s/p mechanical fall at [employer] she works at due to standing water with pain and difficulty walking. Transferred here for ortho from [facility] due to patela fx with sever comminution seen on imaging  -initially ortho reported plan for outpatient surgery repair but ended up taking to OR 7/8 AM for ORIF patella. Plan for ASA BID x 8 weeks- end date September 3rd, wbat in hinged knee brace locked in extension  -pain control not controlled pre op 7/8 overnight with adjustments made and now anesthesia managing post op with ropivicaine and multimodals in place. Some increased pain with therapy session 7/9 and working for pain control as calmed down now in chair post session. Will dc with tylenol, oxy and PNCC pump per anesthesia with jonas controlled on this regimen  -progerssive mobility plan with hinged knee brace per ortho op note (See image copied and pasted into op note for details by week) and gave her copy of this .           Nicotine use  She reports no issues with withdrawal and declined nicotine patch  Counselled on cessation     Doing well without cigarettes since admit and plans to try to stay off them on discharge    Consults: ortho, anesthesia    Last CBC/BMP:    CBC/Anemia Labs: Coags:    Recent Labs   Lab 07/06/24  1026 07/06/24  1941  07/07/24  0403   WBC 11.84 15.65* 10.63   HGB 14.9 15.8 13.7   HCT 45.0 48.0 43.0    256 215   MCV 94 96 98   RDW 12.7 12.9 12.9    Recent Labs   Lab 07/06/24 1026 07/06/24 1941   INR 1.0 1.0   APTT  --  23.6        Chemistries:   Recent Labs   Lab 07/06/24 1026 07/06/24 1941 07/07/24  0403    139 137   K 5.0 3.9 4.0    107 105   CO2 21* 21* 22*   BUN 11 12 11   CREATININE 0.8 0.8 0.8   CALCIUM 9.6 10.1 9.6   PROT 7.2 7.6  --    BILITOT 0.4 0.7  --    ALKPHOS 75 83  --    ALT 19 19  --    AST 16 16  --    MG  --  2.3  --    PHOS  --  2.5*  --             Special Treatments/Procedures:   Procedure(s) (LRB):  ORIF, FRACTURE, PATELLA - LEFT (Left)     Disposition: Home or Self Care      Future Scheduled Appointments:  Future Appointments   Date Time Provider Department Center   7/22/2024  3:00 PM Kayla, Rustam K, NP [facility] [address] [address]   8/19/2024  2:45 PM Kayla, Rustam K, NP [facility] [address]           Discharge Medication List:          Medication List        START taking these medications      acetaminophen 500 MG tablet  Commonly known as: TYLENOL  Take 2 tablets (1,000 mg total) by mouth every 6 (six) hours.     aspirin 81 MG EC tablet  Commonly known as: ECOTRIN  Take 1 tablet (81 mg total) by mouth 2 (two) times a day. End date September 3 2024     celecoxib 200 MG capsule  Commonly known as: CeleBREX  Take 1 capsule (200 mg total) by mouth once daily.     methocarbamoL 500 MG Tab  Commonly known as: ROBAXIN  Take 1 tablet (500 mg total) by mouth every 6 (six) hours.     oxyCODONE 5 MG immediate release tablet  Commonly known as: ROXICODONE  Take 1 tablet (5 mg total) by mouth every 4 (four) hours as needed (pain scale 7-10).            CONTINUE taking these medications      omeprazole 20 MG capsule  Commonly known as: PRILOSEC  Take 1 capsule (20 mg total) by mouth once daily.     senna-docusate 8.6-50 mg 8.6-50 mg per tablet  Commonly known as: PERICOLACE  Take 1 tablet  "by mouth 2 (two) times daily.            STOP taking these medications      HYDROcodone-acetaminophen 7.5-325 mg per tablet  Commonly known as: NORCO     ibuprofen 800 MG tablet  Commonly known as: NEREYDA TOPETE               Where to Get Your Medications        These medications were sent to [facility]  [address] [address]      Hours: Always Open Phone: [phone]   celecoxib 200 MG capsule  methocarbamoL 500 MG Tab  oxyCODONE 5 MG immediate release tablet  senna-docusate 8.6-50 mg 8.6-50 mg per tablet       Information about where to get these medications is not yet available    Ask your nurse or doctor about these medications  acetaminophen 500 MG tablet  aspirin 81 MG EC tablet         Patient Instructions:  Discharge Procedure Orders   WALKER FOR HOME USE     Order Specific Question Answer Comments   Type of Walker: Adult (5'4"-6'6")    With wheels? Yes    Height: 5' 5" (1.651 m)    Weight: 72.6 kg (160 lb)    Length of need (1-99 months): 99    Does patient have medical equipment at home? none    Please check all that apply: Patient's condition impairs ambulation.    Please check all that apply: Patient is unable to safely ambulate without equipment.      WALKER FOR HOME USE     Order Specific Question Answer Comments   Type of Walker: Adult (5'4"-6'6")    With wheels? Yes    Height: 5' 5" (1.651 m)    Weight: 72.6 kg (160 lb)    Length of need (1-99 months): 99    Does patient have medical equipment at home? none    Please check all that apply: Patient's condition impairs ambulation.      Diet Adult Regular     Other restrictions (specify):   Order Comments: See restrictions given on handout     Notify your health care provider if you experience any of the following:  temperature >100.4     Notify your health care provider if you experience any of the following:  severe uncontrolled pain     Notify your health care provider if you experience any of the following:  " difficulty breathing or increased cough     Reason for not Ordering Smoking Cessation Referral     Order Specific Question Answer Comments   Reason for not ordering: Patient refused      Reason for not Prescribing Nicotine Replacement     Order Specific Question Answer Comments   Reason for not Prescribing: Patient refused        At the time of discharge patient was told to take all medications as prescribed, to keep all followup appointments, and to call their primary care physician or return to the emergency room if they have any worsening or concerning symptoms.    I spent 35 minutes preparing the discharge      Signing Physician:  Jennifer Chilel MD

## 2024-07-10 NOTE — PLAN OF CARE
[facility]  Discharge Final Note    Primary Care Provider: Vicky Antonio NP    Expected Discharge Date: 7/10/2024    Final Discharge Note (most recent)       Final Note - 07/10/24 1403          Final Note    Assessment Type Final Discharge Note     Anticipated Discharge Disposition Home or Self Care     What phone number can be called within the next 1-3 days to see how you are doing after discharge? --   [phone]                    Important Message from Medicare             Contact Info       Vicky Antonio NP   Specialty: Family Medicine   Relationship: PCP - [address]  [place] [address]   Phone: [phone]       Next Steps: Schedule an appointment as soon as possible for a visit in 1 day(s)    [facility] - [facility] [address]   Specialty: Orthopedics    [address], [address]  [facility] [address]   Phone: [phone]       Next Steps: Schedule an appointment as soon as possible for a visit today    Instructions: surgery scheduled for 7/17/24            Future Appointments   Date Time Provider Department Center   7/22/2024  3:00 PM Rustam Garza, NP [facility]   8/19/2024  2:45 PM Rustam Garza NP [facility]     Patient discharged home to care of family on 7/10/24.    Connie Rivera RNCM  Case Management  [facility]  [phone]

## 2024-07-10 NOTE — PROGRESS NOTES
Pt and family present, amenable to home use of CADD pump for femoral PNC.  Site CDI.  Educated regarding continued pain management, heightened fall risk - as a result of quad weakness and the importance of wearing knee immobilizer with ambulation, signs of complications, continued monitoring, as well as discontinuing catheter on 7/13. Understanding verbalized.

## 2024-07-10 NOTE — ASSESSMENT & PLAN NOTE
Catalina Abrams is a 48 y.o. female with PMH of tobacco use presenting with a left displaced patellar fracture that occurred after a fall onto the left knee yesterday morning.  Closed, neurovascularly intact.      S/p ORIF L patella 7/8/24      ASA 81 mg BID x 8 wks postop for DVT prophylaxis  WBAT LLE w/ hinged knee brace locked in extension and brace on properly    Ok for discharge from orthopedic surgery standpoint

## 2024-07-12 NOTE — ANESTHESIA POST-OP PAIN MANAGEMENT
07/12/2024     Catalina Abrams called on-call resident about the Nimbus pump and PNC last night. Pump was alarming volume low/depleted. She is POD 3-4. Patient instructed to remove PNC.    Michael Jackson, DO  PGY-4/CA-3

## 2024-07-12 NOTE — ADDENDUM NOTE
Addendum  created 07/12/24 0623 by Juve Jackson DO    Clinical Note Signed, Intraprocedure Event edited

## 2024-07-22 ENCOUNTER — HOSPITAL ENCOUNTER (OUTPATIENT)
Dept: RADIOLOGY | Facility: HOSPITAL | Age: 49
Discharge: HOME | End: 2024-07-22
Attending: NURSE PRACTITIONER
Payer: COMMERCIAL

## 2024-07-22 ENCOUNTER — OFFICE VISIT (OUTPATIENT)
Dept: ORTHOPEDICS | Facility: CLINIC | Age: 49
End: 2024-07-22
Payer: COMMERCIAL

## 2024-07-22 DIAGNOSIS — S82.042D CLOSED DISPLACED COMMINUTED FRACTURE OF LEFT PATELLA WITH ROUTINE HEALING, SUBSEQUENT ENCOUNTER: ICD-10-CM

## 2024-07-22 DIAGNOSIS — S82.042D CLOSED DISPLACED COMMINUTED FRACTURE OF LEFT PATELLA WITH ROUTINE HEALING, SUBSEQUENT ENCOUNTER: Primary | ICD-10-CM

## 2024-07-22 DIAGNOSIS — Z98.890 POST-OPERATIVE STATE: ICD-10-CM

## 2024-07-22 PROCEDURE — 99999 PR PBB SHADOW E&M-EST. PATIENT-LVL III: CPT | Mod: PBBFAC,,, | Performed by: NURSE PRACTITIONER

## 2024-07-22 PROCEDURE — 73560 X-RAY EXAM OF KNEE 1 OR 2: CPT | Mod: 26,LT,, | Performed by: RADIOLOGY

## 2024-07-22 PROCEDURE — 1160F RVW MEDS BY RX/DR IN RCRD: CPT | Mod: CPTII,S$GLB,, | Performed by: NURSE PRACTITIONER

## 2024-07-22 PROCEDURE — 73560 X-RAY EXAM OF KNEE 1 OR 2: CPT | Mod: TC,LT

## 2024-07-22 PROCEDURE — 1159F MED LIST DOCD IN RCRD: CPT | Mod: CPTII,S$GLB,, | Performed by: NURSE PRACTITIONER

## 2024-07-22 PROCEDURE — 99024 POSTOP FOLLOW-UP VISIT: CPT | Mod: S$GLB,,, | Performed by: NURSE PRACTITIONER

## 2024-07-22 PROCEDURE — 3044F HG A1C LEVEL LT 7.0%: CPT | Mod: CPTII,S$GLB,, | Performed by: NURSE PRACTITIONER

## 2024-07-22 NOTE — PROGRESS NOTES
Ms. Abrams is here today for a post-operative visit after undergoing the following:    Open reduction internal fixation left patella fracture      by Dr. Mullins on 7/8/2024.    Interval History:  She reports that she is doing ok.  She states their pain is tolerable.  She is taking pain medication.  She has followed her weight bearing restrictions.  She denies any falls or injuries since surgery/last seen in the clinic.  She denies fever, chills, and sweats since the time of the surgery.     Physical exam:  The patient's dressing was taken down.  Incision is clean, dry and intact.  No signs of infection noted.  Skin was closed with Dermabond and Stratifix ends were clipped.  She has tactile stimulation to their lower leg, she denies calf pain, there is no leg edema and their pedal pulse is palpable x 2.     RADS:  Left knee x-ray was obtained, findings show patella fracture appears to be stable.  No other fractures noted.    Assessment:  Post-op visit (2 weeks)    Plan:    ICD-10-CM ICD-9-CM   1. Closed displaced comminuted fracture of left patella with routine healing, subsequent encounter s/p ORIF 7/8/24  S82.042D V54.16   2. Post-operative state  Z98.890 V45.89     Current care, treatment plan, precautions, activity level/ modifications, limitations, rehabilitation exercises and proposed future treatment were discussed with the patient. We discussed the need to monitor for changes in symptoms and condition and report them to the physician.  Discussed importance of compliance with all appointments and follow up examinations.     WOUND CARE ORDERS  -Catalina was advised to keep the incision clean and dry for the next 24 hours after which she may wash the area with antibacterial soap in the shower.   -She not submerge until the incision is completely healed  -Patient was advised to monitor wound closely and multiple times daily for any problems. Call clinic immediately or report to ED for immediate medical attention  for any complications including reopening of wound, drainage, purulence, redness, streaking, odor, pain out of proportion, fever, chills, etc.   - If there are signs of infection, please call Ortho Clinic [phone] for further instructions and to make appt to be seen.       PHYSICAL THERAPY:   - Physical therapy will refer to [facility] in [place] - [phone].  - Weight bearing as below  - Range of motion as below    Phase I Week 0-2  -Knee immobilizer: Worn at all times - taken off for only physical therapy sessions converted to hinged knee brace at first post-op visit.  -Weightbearing: Weight bear as tolerated with the knees locked in extension.  -ROM: AROM/AAROM/PROM 0-30 degrees  -Therapeutic exercises Isometric quadriceps/hamstring/adductor/abductor strengthening, ankle teraband exercises.    Phase II Week 2-6  -Knee brace with weightbearing activities in locked in full extension ok to remove at night.  -Weightbearing: ROM AROM/AAROM/PROM - add 15 degrees of flexion each week- goal is 90 degrees by week 6  -Therapeutic exercises Isometric quadriceps/hamstring/adductor/abductor strengthening, ankle teraband exercises, initiate straight leg raises.    Phase III 6-10 weeks  -Knee brace: unlocked - worn with weightbearing activities  -Weightbearing full  -ROM AROM/AAROM/PROM - progress to full ROM by week 10  -Therapeutic exercises Isometric quadriceps/hamstring/adductor/abductor strengthening, ankle teraband exercises, initiate straight leg raises.    Phase IV: 10-12 weeks  -Discontinue knee brace  -Full weightbearing  -Full ROM  -Therapeutic exercises Isometric quadriceps/hamstring/adductor/abductor strengthening, ankle teraband exercises, initiate straight leg raises, start stationary bike.    Phase V: 3-6 months  -Return to full activities as tolerated.       PAIN MEDICATION:   - Pain medication: refill was not needed  - Pain medication refill policy provided to patient for review, yes.    - Patient was  informed a multi-modal approach is used to treat their pain.     DVT PROPHYLAXIS:   - ASA 81 mg bid x 8 weeks    FRAGILITY:  - Patient has been referred to the fracture clinic.     FOLLOW UP:   - Patient will follow up in the clinic in 4 weeks.  - X-ray of her left knee is needed.    - Future Appointments:   Future Appointments   Date Time Provider Department Center   8/19/2024  2:45 PM Rustam Garza NP [facility]       If there are any questions prior to scheduled follow up, the patient was instructed to contact the office

## 2024-07-25 DIAGNOSIS — Z98.890 POST-OPERATIVE STATE: ICD-10-CM

## 2024-07-25 DIAGNOSIS — S82.042D CLOSED DISPLACED COMMINUTED FRACTURE OF LEFT PATELLA WITH ROUTINE HEALING, SUBSEQUENT ENCOUNTER: Primary | ICD-10-CM

## 2024-07-30 NOTE — PROGRESS NOTES
v [facility]   Physical Therapy Initial Evaluation      Name: Catalina Abrams  Clinic Number: [id number]    Therapy Diagnosis: Impaired functional mobility, balance, gait, and endurance     Physician: Rustam Garza NP    Physician Orders: PT Eval and Treat   Medical Diagnosis from Referral: Open reduction internal fixation left patella fracture  Evaluation Date: 8/2/2024  Authorization Period Expiration: 8/2/2025  Plan of Care Expiration: 11/2/2024  Progress Note Due: 9/14/24  Date of Surgery: 7/8/24  Visit # / Visits authorized: 1/ 1   FOTO: 1/ 3    Precautions: Phase I Week 0-2  -Knee immobilizer: Worn at all times - taken off for only physical therapy sessions converted to hinged knee brace at first post-op visit.  -Weightbearing: Weight bear as tolerated with the knees locked in extension.  -ROM: AROM/AAROM/PROM 0-30 degrees  -Therapeutic exercises Isometric quadriceps/hamstring/adductor/abductor strengthening, ankle teraband exercises.    Phase II Week 2-6  -Knee brace with weightbearing activities in locked in full extension ok to remove at night.  -Weightbearing: ROM AROM/AAROM/PROM - add 15 degrees of flexion each week- goal is 90 degrees by week 6  -Therapeutic exercises Isometric quadriceps/hamstring/adductor/abductor strengthening, ankle theraband exercises, initiate straight leg raises.    Phase III 6-10 weeks  -Knee brace: unlocked - worn with weightbearing activities  -Weightbearing full  -ROM AROM/AAROM/PROM - progress to full ROM by week 10  -Therapeutic exercises Isometric quadriceps/hamstring/adductor/abductor strengthening, ankle teraband exercises, initiate straight leg raises.    Phase IV: 10-12 weeks  -Discontinue knee brace  -Full weightbearing  -Full ROM  -Therapeutic exercises Isometric quadriceps/hamstring/adductor/abductor strengthening, ankle teraband exercises, initiate straight leg raises, start stationary bike.    Phase V: 3-6 months  -Return to full  activities as tolerated.    Time In: 8:00  Time Out: 8:45  Total Billable Time: 45 minutes    Subjective     Date of onset: 24    History of current condition - Ochoa reports: she fell on 24 when slipped on slimy algae on concrete on her left knee.    Falls: initial fall    Imaging: bone scan films: Satisfactory interval reduction displaced comminuted fracture patella. New periosteal reaction versus horizontal bone fragment projecting from posteroinferior aspect of fracture 5 mm. Remaining prepatellar postop edema. No joint effusion.     Prior Therapy: no  Social History: Ochoa lives with her fiance in a single story house.  Occupation: manager at [employer]  Prior Level of Function: independent  Current Level of Function: limited functional activities    Pain:  Current 5/10, worst 8/10, best 3/10   Location: left knee       Description: Sharp  Aggravating Factors: Walking and Night Time  Easing Factors: lying down and rest    Patients goals: to get back to work     Medical History:   Past Medical History:   Diagnosis Date    Known health problems: none        Surgical History:   Catalina Abrams  has a past surgical history that includes Appendectomy; Tubal ligation ();  section; Hysteroscopy with dilation and curettage of uterus (N/A, 8/15/2018); Thermal ablation of endometrium (N/A, 8/15/2018); and Open reduction and internal fixation (ORIF) of fracture of patella (Left, 2024).    Medications:   Catalina has a current medication list which includes the following prescription(s): acetaminophen, aspirin, celecoxib, methocarbamol, omeprazole, oxycodone, and senna-docusate 8.6-50 mg.    Allergies:   Review of patient's allergies indicates:  No Known Allergies     Objective        Observation: Patient ambulates independently with axillary crutches.    Edema: minimal    Range of Motion:   Knee Left active flexion Left Passive flexion Left Active extension Left passive extension    maday 10 0 0        Lower Extremity Strength   Left Right   Knee extension: maday 5/5   Knee flexion: maday 5/5   Hip flexion: maday 5/5   Hip extension:  maday 5/5   Hip abduction: maday 5/5   Hip adduction: maday 5/5   Hip IR maday 5/5   Hip ER maday    Ankle dorsiflexion: 4/5 5/5   Ankle plantarflexion: 4/5 5/5         Joint Mobility: Patellar: maday    Palpation: moderate tenderness to palpation at left patella      Sensation: decreased left patella to light touch    PT Evaluation Completed: Yes  Discussed Plan of Care with patient: Yes    Intake Outcome Measure for FOTO knee Survey    Therapist reviewed FOTO scores for Catalina Abrams on 8/2/2024.   FOTO report - see Media section or FOTO account episode details.    Intake Score: 66%         Treatment     Total Treatment time (time-based codes) separate from Evaluation: 30 minutes     Trang received the treatments listed below:      therapeutic exercises to develop strength, endurance, ROM, and flexibility for 30 minutes including:  Role of PT  Quad sets x 10  Glut sets x 10  Knee flexion (AAROM) to 10 degrees x 5    manual therapy techniques: Joint mobilizations, Myofacial release, and Soft tissue Mobilization were applied to the: left knee for 0 minutes, including:    neuromuscular re-education activities to improve: Balance, Coordination, and Posture for 0 minutes. The following activities were included:      therapeutic activities to improve functional performance for 0  minutes, including:      gait training to improve functional mobility and safety for 10  minutes, including:  Instructed patient in sequencing WBAT LLE with axillary crutches.  Patient and fiance verbalized understanding.      Patient Education and Home Exercises     Education provided:   - Role of PT, work on strength, range of motion,     Written Home Exercises Provided:  No exercises given today.  Will develop program over next few sessions .   Assessment     Catalina is a 48 y.o. female referred to outpatient Physical  Therapy with a medical diagnosis of Open reduction internal fixation left patella fracture. Patient presents with impaired gait, knee pain, decreased functional activity tolerance.    Patient prognosis is Good.   Patient will benefit from skilled outpatient Physical Therapy to address the deficits stated above and in the chart below, provide patient /family education, and to maximize patientt's level of independence.     Plan of care discussed with patient: Yes  Patient's spiritual, cultural and educational needs considered and patient is agreeable to the plan of care and goals as stated below:     Anticipated Barriers for therapy: 0    Medical Necessity is demonstrated by the following  History  Co-morbidities and personal factors that may impact the plan of care [x] LOW: no personal factors / co-morbidities  [] MODERATE: 1-2 personal factors / co-morbidities  [] HIGH: 3+ personal factors / co-morbidities    Moderate / High Support Documentation:   Co-morbidities affecting plan of care: n/a    Personal Factors:   N/a     Examination  Body Structures and Functions, activity limitations and participation restrictions that may impact the plan of care [x] LOW: addressing 1-2 elements  [] MODERATE: 3+ elements  [] HIGH: 4+ elements (please support below)    Moderate / High Support Documentation: n/a     Clinical Presentation [x] LOW: stable  [] MODERATE: Evolving  [] HIGH: Unstable     Decision Making/ Complexity Score: low       Goals:  Short Term Goals: 3 weeks   1.Compliant with HEP.  2.Reduction in pain to no more than 4/10 for improved ability to perform daily activities.  3.Increase left knee ROM per appropriate protocol.  4.Increase left knee strength to 3+/5.    Long Term Goals: 6 weeks   1.Independent with HEP.  2.Reduction in pain to no more than 2/10 for improved ability to perform daily activities.  3.Increase left knee ROM per appropriate protocol.  4.Increase left knee strength to 4+/5.  5.Ambulate  independently with LRD.  6.FOTO score improved to <20%.  Plan     Plan of care Certification: 8/2/2024 to 11/2/2024.    Outpatient Physical Therapy 2 times weekly for 6 weeks to include the following interventions: Gait Training, Manual Therapy, Neuromuscular Re-ed, Patient Education, Therapeutic Activities, and Therapeutic Exercise.     Lyly Carmona, PT        Physician's Signature: _________________________________________ Date: ________________

## 2024-08-02 ENCOUNTER — CLINICAL SUPPORT (OUTPATIENT)
Dept: REHABILITATION | Facility: HOSPITAL | Age: 49
End: 2024-08-02
Payer: COMMERCIAL

## 2024-08-02 DIAGNOSIS — Z74.09 IMPAIRED FUNCTIONAL MOBILITY, BALANCE, GAIT, AND ENDURANCE: Primary | ICD-10-CM

## 2024-08-02 PROCEDURE — 97161 PT EVAL LOW COMPLEX 20 MIN: CPT

## 2024-08-02 PROCEDURE — 97110 THERAPEUTIC EXERCISES: CPT

## 2024-08-02 PROCEDURE — 97116 GAIT TRAINING THERAPY: CPT

## 2024-08-05 ENCOUNTER — CLINICAL SUPPORT (OUTPATIENT)
Dept: REHABILITATION | Facility: HOSPITAL | Age: 49
End: 2024-08-05
Payer: COMMERCIAL

## 2024-08-05 DIAGNOSIS — S82.042D CLOSED DISPLACED COMMINUTED FRACTURE OF LEFT PATELLA WITH ROUTINE HEALING, SUBSEQUENT ENCOUNTER: Primary | ICD-10-CM

## 2024-08-05 DIAGNOSIS — Z74.09 IMPAIRED FUNCTIONAL MOBILITY, BALANCE, GAIT, AND ENDURANCE: Primary | ICD-10-CM

## 2024-08-05 PROCEDURE — 97110 THERAPEUTIC EXERCISES: CPT

## 2024-08-05 RX ORDER — CELECOXIB 200 MG/1
200 CAPSULE ORAL DAILY
Qty: 30 CAPSULE | Refills: 1 | Status: SHIPPED | OUTPATIENT
Start: 2024-08-05

## 2024-08-05 RX ORDER — METHOCARBAMOL 500 MG/1
500 TABLET, FILM COATED ORAL EVERY 6 HOURS
Qty: 65 TABLET | Refills: 1 | Status: SHIPPED | OUTPATIENT
Start: 2024-08-05

## 2024-08-07 ENCOUNTER — CLINICAL SUPPORT (OUTPATIENT)
Dept: REHABILITATION | Facility: HOSPITAL | Age: 49
End: 2024-08-07
Payer: COMMERCIAL

## 2024-08-07 DIAGNOSIS — Z74.09 IMPAIRED FUNCTIONAL MOBILITY, BALANCE, GAIT, AND ENDURANCE: Primary | ICD-10-CM

## 2024-08-07 PROCEDURE — 97110 THERAPEUTIC EXERCISES: CPT

## 2024-08-08 ENCOUNTER — HOSPITAL ENCOUNTER (OUTPATIENT)
Dept: RADIOLOGY | Facility: HOSPITAL | Age: 49
Discharge: HOME | End: 2024-08-08
Attending: NURSE PRACTITIONER
Payer: COMMERCIAL

## 2024-08-08 DIAGNOSIS — Z12.31 ENCOUNTER FOR SCREENING MAMMOGRAM FOR MALIGNANT NEOPLASM OF BREAST: ICD-10-CM

## 2024-08-08 PROCEDURE — 77067 SCR MAMMO BI INCL CAD: CPT | Mod: 26,,, | Performed by: RADIOLOGY

## 2024-08-08 PROCEDURE — 77063 BREAST TOMOSYNTHESIS BI: CPT | Mod: 26,,, | Performed by: RADIOLOGY

## 2024-08-08 PROCEDURE — 77063 BREAST TOMOSYNTHESIS BI: CPT | Mod: TC

## 2024-08-08 PROCEDURE — 77067 SCR MAMMO BI INCL CAD: CPT | Mod: TC

## 2024-08-12 ENCOUNTER — PATIENT MESSAGE (OUTPATIENT)
Dept: ORTHOPEDICS | Facility: CLINIC | Age: 49
End: 2024-08-12
Payer: COMMERCIAL

## 2024-08-12 ENCOUNTER — CLINICAL SUPPORT (OUTPATIENT)
Dept: REHABILITATION | Facility: HOSPITAL | Age: 49
End: 2024-08-12
Payer: COMMERCIAL

## 2024-08-12 DIAGNOSIS — Z74.09 IMPAIRED FUNCTIONAL MOBILITY, BALANCE, GAIT, AND ENDURANCE: Primary | ICD-10-CM

## 2024-08-12 DIAGNOSIS — S82.042D CLOSED DISPLACED COMMINUTED FRACTURE OF LEFT PATELLA WITH ROUTINE HEALING, SUBSEQUENT ENCOUNTER: Primary | ICD-10-CM

## 2024-08-12 PROCEDURE — 97110 THERAPEUTIC EXERCISES: CPT

## 2024-08-12 PROCEDURE — 97116 GAIT TRAINING THERAPY: CPT

## 2024-08-12 RX ORDER — BACLOFEN 10 MG/1
10 TABLET ORAL 3 TIMES DAILY
Qty: 90 TABLET | Refills: 11 | Status: SHIPPED | OUTPATIENT
Start: 2024-08-12 | End: 2025-08-12

## 2024-08-12 NOTE — PROGRESS NOTES
[facility]   Physical Therapy Treatment Note      Name: Catalina Abrams  Clinic Number: [id number]    Therapy Diagnosis: Impaired functional mobility, balance, gait, and endurance  Physician: Rustam Garza NP    Visit Date: 8/12/2024    Physician Orders: PT Eval and Treat   Medical Diagnosis from Referral: Open reduction internal fixation left patella fracture  Evaluation Date: 8/2/2024  Authorization Period Expiration: 8/2/2025  Plan of Care Expiration: 11/2/2024  Progress Note Due: 9/14/24  Date of Surgery: 7/8/24  Visit # / Visits authorized: 4/12   FOTO: 1/ 3      Time In: 8:00  Time Out: 8:45  Total Billable Time: 45 minutes  PTA Visit #: 0/5     Precautions: Phase I Week 0-2  -Knee immobilizer: Worn at all times - taken off for only physical therapy sessions converted to hinged knee brace at first post-op visit.  -Weightbearing: Weight bear as tolerated with the knees locked in extension.  -ROM: AROM/AAROM/PROM 0-30 degrees  -Therapeutic exercises Isometric quadriceps/hamstring/adductor/abductor strengthening, ankle teraband exercises.     Phase II Week 2-6  -Knee brace with weightbearing activities in locked in full extension ok to remove at night.  -Weightbearing: ROM AROM/AAROM/PROM - add 15 degrees of flexion each week- goal is 90 degrees by week 6  -Therapeutic exercises Isometric quadriceps/hamstring/adductor/abductor strengthening, ankle theraband exercises, initiate straight leg raises.     Phase III 6-10 weeks  -Knee brace: unlocked - worn with weightbearing activities  -Weightbearing full  -ROM AROM/AAROM/PROM - progress to full ROM by week 10  -Therapeutic exercises Isometric quadriceps/hamstring/adductor/abductor strengthening, ankle teraband exercises, initiate straight leg raises.     Phase IV: 10-12 weeks  -Discontinue knee brace  -Full weightbearing  -Full ROM  -Therapeutic exercises Isometric quadriceps/hamstring/adductor/abductor strengthening, ankle teraband  exercises, initiate straight leg raises, start stationary bike.     Phase V: 3-6 months  -Return to full activities as tolerated.      Subjective     Patient reports: she's feeling better this morning and has been icing .  She was compliant with home exercise program.  Response to previous treatment: good  Functional change: none    Pain: 3/10  Location: left knee      Objective      Objective Measures updated at progress report unless specified.     Treatment     Trang received the treatments listed below:      therapeutic exercises to develop strength, endurance, ROM, and flexibility for 35 minutes including:  Quad sets x 15  Glut sets x 15  Knee flex (AROM) 45 degrees x 15   Hamstring sets x 15  Hip abd/add supine x 15  SLR's (initiate with QS) x 15    therapeutic activities to improve functional performance for 0  minutes, including:    gait training to improve functional mobility and safety for 10  minutes, including:  Brace was unlocked to 90 degrees per protocol, and patient ambulated 25' with axillary crutches.        Patient Education and Home Exercises       Education provided:   - Quad sets, hamstring sets    Written Home Exercises Provided: Yes. Exercises were reviewed and Trang was able to demonstrate them prior to the end of the session.  Trang demonstrated good  understanding of the education provided. See Electronic Medical Record under Patient Instructions for exercises provided during therapy sessions    Assessment     Catalina is a 48 y.o. female referred to outpatient Physical Therapy with a medical diagnosis of Open reduction internal fixation left patella fracture. Patient presents with impaired gait, knee pain, decreased functional activity tolerance.     Trang Is progressing well towards her goals.   Patient prognosis is Good.     Patient will continue to benefit from skilled outpatient physical therapy to address the deficits listed in the problem list box on initial evaluation, provide  pt/family education and to maximize pt's level of independence in the home and community environment.     Patient's spiritual, cultural and educational needs considered and pt agreeable to plan of care and goals.     Anticipated barriers to physical therapy: 0    Goals: Short Term Goals: 3 weeks   1.Compliant with HEP.  2.Reduction in pain to no more than 4/10 for improved ability to perform daily activities.  3.Increase left knee ROM per appropriate protocol.  4.Increase left knee strength to 3+/5.     Long Term Goals: 6 weeks   1.Independent with HEP.  2.Reduction in pain to no more than 2/10 for improved ability to perform daily activities.  3.Increase left knee ROM per appropriate protocol.  4.Increase left knee strength to 4+/5.  5.Ambulate independently with LRD.  6.FOTO score improved to <20%.    Plan     Plan of care Certification: 8/2/2024 to 11/2/2024.     Outpatient Physical Therapy 2 times weekly for 6 weeks to include the following interventions: Gait Training, Manual Therapy, Neuromuscular Re-ed, Patient Education, Therapeutic Activities, and Therapeutic Exercise.     Lyly Carmona, PT

## 2024-08-14 ENCOUNTER — CLINICAL SUPPORT (OUTPATIENT)
Dept: REHABILITATION | Facility: HOSPITAL | Age: 49
End: 2024-08-14
Payer: COMMERCIAL

## 2024-08-14 DIAGNOSIS — Z74.09 IMPAIRED FUNCTIONAL MOBILITY, BALANCE, GAIT, AND ENDURANCE: Primary | ICD-10-CM

## 2024-08-14 PROCEDURE — 97110 THERAPEUTIC EXERCISES: CPT

## 2024-08-14 NOTE — PROGRESS NOTES
[facility]   Physical Therapy Treatment Note      Name: Catalina Abrams  Clinic Number: [id number]    Therapy Diagnosis: Impaired functional mobility, balance, gait, and endurance  Physician: Rustam Garza NP    Visit Date: 8/14/2024    Physician Orders: PT Eval and Treat   Medical Diagnosis from Referral: Open reduction internal fixation left patella fracture  Evaluation Date: 8/2/2024  Authorization Period Expiration: 8/2/2025  Plan of Care Expiration: 11/2/2024  Progress Note Due: 9/14/24  Date of Surgery: 7/8/24  Visit # / Visits authorized: 5/12   FOTO: 1/ 3      Time In: 8:00  Time Out: 8:45  Total Billable Time: 45 minutes  PTA Visit #: 0/5     Precautions: Phase I Week 0-2  -Knee immobilizer: Worn at all times - taken off for only physical therapy sessions converted to hinged knee brace at first post-op visit.  -Weightbearing: Weight bear as tolerated with the knees locked in extension.  -ROM: AROM/AAROM/PROM 0-30 degrees  -Therapeutic exercises Isometric quadriceps/hamstring/adductor/abductor strengthening, ankle teraband exercises.     Phase II Week 2-6  -Knee brace with weightbearing activities in locked in full extension ok to remove at night.  -Weightbearing: ROM AROM/AAROM/PROM - add 15 degrees of flexion each week- goal is 90 degrees by week 6  -Therapeutic exercises Isometric quadriceps/hamstring/adductor/abductor strengthening, ankle theraband exercises, initiate straight leg raises.     Phase III 6-10 weeks  -Knee brace: unlocked - worn with weightbearing activities  -Weightbearing full  -ROM AROM/AAROM/PROM - progress to full ROM by week 10  -Therapeutic exercises Isometric quadriceps/hamstring/adductor/abductor strengthening, ankle teraband exercises, initiate straight leg raises.     Phase IV: 10-12 weeks  -Discontinue knee brace  -Full weightbearing  -Full ROM  -Therapeutic exercises Isometric quadriceps/hamstring/adductor/abductor strengthening, ankle teraband  exercises, initiate straight leg raises, start stationary bike.     Phase V: 3-6 months  -Return to full activities as tolerated.      Subjective     Patient reports: she continues with muscle spasms in her right thigh, mainly at night, which interrupts her sleep.  She called her MD who changed her meds.  She states that it has not helped as yet.  She was compliant with home exercise program.  Response to previous treatment: good  Functional change: none    Pain: 2/10  Location: left knee      Objective      AAROM right knee flexion 45 degrees    Treatment     Trang received the treatments listed below:      therapeutic exercises to develop strength, endurance, ROM, and flexibility for 45 minutes including:  Quad sets x 15  Glut sets x 15  Knee flex (AROM) 45 degrees x 15   Hamstring sets x 15  Hip abd/add supine x 15  SLR's (initiate with QS) x 15  SAQ's with assist x 5  Hip adductor with ball x 15    therapeutic activities to improve functional performance for 0  minutes, including:    gait training to improve functional mobility and safety for 0  minutes, including:  Brace was unlocked to 90 degrees per protocol, and patient ambulated 25' with axillary crutches.        Patient Education and Home Exercises       Education provided:   - Quad sets, hamstring sets    Written Home Exercises Provided: Yes. Exercises were reviewed and Trang was able to demonstrate them prior to the end of the session.  Trang demonstrated good  understanding of the education provided. See Electronic Medical Record under Patient Instructions for exercises provided during therapy sessions    Assessment     Catalina is a 48 y.o. female referred to outpatient Physical Therapy with a medical diagnosis of Open reduction internal fixation left patella fracture. Patient presents with impaired gait, knee pain, decreased functional activity tolerance.     Trang Is progressing well towards her goals.   Patient prognosis is Good.     Patient will  continue to benefit from skilled outpatient physical therapy to address the deficits listed in the problem list box on initial evaluation, provide pt/family education and to maximize pt's level of independence in the home and community environment.     Patient's spiritual, cultural and educational needs considered and pt agreeable to plan of care and goals.     Anticipated barriers to physical therapy: 0    Goals: Short Term Goals: 3 weeks   1.Compliant with HEP.  2.Reduction in pain to no more than 4/10 for improved ability to perform daily activities.  3.Increase left knee ROM per appropriate protocol.  4.Increase left knee strength to 3+/5.     Long Term Goals: 6 weeks   1.Independent with HEP.  2.Reduction in pain to no more than 2/10 for improved ability to perform daily activities.  3.Increase left knee ROM per appropriate protocol.  4.Increase left knee strength to 4+/5.  5.Ambulate independently with LRD.  6.FOTO score improved to <20%.    Plan     Plan of care Certification: 8/2/2024 to 11/2/2024.     Outpatient Physical Therapy 2 times weekly for 6 weeks to include the following interventions: Gait Training, Manual Therapy, Neuromuscular Re-ed, Patient Education, Therapeutic Activities, and Therapeutic Exercise.     Lyly Carmona, PT

## 2024-08-19 ENCOUNTER — CLINICAL SUPPORT (OUTPATIENT)
Dept: REHABILITATION | Facility: HOSPITAL | Age: 49
End: 2024-08-19
Payer: COMMERCIAL

## 2024-08-19 DIAGNOSIS — Z74.09 IMPAIRED FUNCTIONAL MOBILITY, BALANCE, GAIT, AND ENDURANCE: Primary | ICD-10-CM

## 2024-08-19 PROCEDURE — 97110 THERAPEUTIC EXERCISES: CPT

## 2024-08-19 NOTE — PROGRESS NOTES
[facility]   Physical Therapy Treatment Note      Name: Catalina Abrams  Clinic Number: [id number]    Therapy Diagnosis: Impaired functional mobility, balance, gait, and endurance  Physician: Rustam Garza NP    Visit Date: 8/19/2024    Physician Orders: PT Eval and Treat   Medical Diagnosis from Referral: Open reduction internal fixation left patella fracture  Evaluation Date: 8/2/2024  Authorization Period Expiration: 8/2/2025  Plan of Care Expiration: 11/2/2024  Progress Note Due: 9/14/24  Date of Surgery: 7/8/24  Visit # / Visits authorized: 6/12   FOTO: 1/ 3      Time In: 8:00  Time Out: 8:45  Total Billable Time: 45 minutes  PTA Visit #: 0/5     Precautions: Phase I Week 0-2  -Knee immobilizer: Worn at all times - taken off for only physical therapy sessions converted to hinged knee brace at first post-op visit.  -Weightbearing: Weight bear as tolerated with the knees locked in extension.  -ROM: AROM/AAROM/PROM 0-30 degrees  -Therapeutic exercises Isometric quadriceps/hamstring/adductor/abductor strengthening, ankle teraband exercises.     Phase II Week 2-6  -Knee brace with weightbearing activities in locked in full extension ok to remove at night.  -Weightbearing: ROM AROM/AAROM/PROM - add 15 degrees of flexion each week- goal is 90 degrees by week 6  -Therapeutic exercises Isometric quadriceps/hamstring/adductor/abductor strengthening, ankle theraband exercises, initiate straight leg raises.     Phase III 6-10 weeks  -Knee brace: unlocked - worn with weightbearing activities  -Weightbearing full  -ROM AROM/AAROM/PROM - progress to full ROM by week 10  -Therapeutic exercises Isometric quadriceps/hamstring/adductor/abductor strengthening, ankle teraband exercises, initiate straight leg raises.     Phase IV: 10-12 weeks  -Discontinue knee brace  -Full weightbearing  -Full ROM  -Therapeutic exercises Isometric quadriceps/hamstring/adductor/abductor strengthening, ankle teraband  exercises, initiate straight leg raises, start stationary bike.     Phase V: 3-6 months  -Return to full activities as tolerated.      Subjective     Patient reports: she is continuing with muscle spasms of her right quads and has an appointment with her surgeon on 8/21/2024.  She was compliant with home exercise program.  Response to previous treatment: good  Functional change: none    Pain: 3/10  Location: left knee      Objective      AAROM right knee flexion 45 degrees    Treatment     Trang received the treatments listed below:      therapeutic exercises to develop strength, endurance, ROM, and flexibility for 45 minutes including:  Quad sets x 15  Glut sets x 15  Knee flex (AROM/PROM) 45 degrees x 15   Hamstring sets x 15  Hip abd/add supine x 15  SLR's (initiate with QS) x 15  SAQ's x 10  Hip adductor with ball x 15    therapeutic activities to improve functional performance for 0  minutes, including:    gait training to improve functional mobility and safety for 0  minutes, including:  Brace was unlocked to 90 degrees per protocol, and patient ambulated 25' with axillary crutches.        Patient Education and Home Exercises       Education provided:   - Quad sets, hamstring sets    Written Home Exercises Provided: Yes. Exercises were reviewed and Trang was able to demonstrate them prior to the end of the session.  Trang demonstrated good  understanding of the education provided. See Electronic Medical Record under Patient Instructions for exercises provided during therapy sessions    Assessment     Catalina is a 48 y.o. female referred to outpatient Physical Therapy with a medical diagnosis of Open reduction internal fixation left patella fracture. Patient presents with impaired gait, knee pain, decreased functional activity tolerance.     Trang Is progressing well towards her goals.   Patient prognosis is Good.     Patient will continue to benefit from skilled outpatient physical therapy to address the  deficits listed in the problem list box on initial evaluation, provide pt/family education and to maximize pt's level of independence in the home and community environment.     Patient's spiritual, cultural and educational needs considered and pt agreeable to plan of care and goals.     Anticipated barriers to physical therapy: 0    Goals: Short Term Goals: 3 weeks   1.Compliant with HEP.  2.Reduction in pain to no more than 4/10 for improved ability to perform daily activities.  3.Increase left knee ROM per appropriate protocol.  4.Increase left knee strength to 3+/5.     Long Term Goals: 6 weeks   1.Independent with HEP.  2.Reduction in pain to no more than 2/10 for improved ability to perform daily activities.  3.Increase left knee ROM per appropriate protocol.  4.Increase left knee strength to 4+/5.  5.Ambulate independently with LRD.  6.FOTO score improved to <20%.    Plan     Plan of care Certification: 8/2/2024 to 11/2/2024.     Outpatient Physical Therapy 2 times weekly for 6 weeks to include the following interventions: Gait Training, Manual Therapy, Neuromuscular Re-ed, Patient Education, Therapeutic Activities, and Therapeutic Exercise.     Lyly Carmona, PT

## 2024-08-21 ENCOUNTER — OFFICE VISIT (OUTPATIENT)
Dept: ORTHOPEDICS | Facility: CLINIC | Age: 49
End: 2024-08-21
Payer: COMMERCIAL

## 2024-08-21 ENCOUNTER — HOSPITAL ENCOUNTER (OUTPATIENT)
Dept: RADIOLOGY | Facility: HOSPITAL | Age: 49
Discharge: HOME | End: 2024-08-21
Attending: NURSE PRACTITIONER
Payer: COMMERCIAL

## 2024-08-21 ENCOUNTER — CLINICAL SUPPORT (OUTPATIENT)
Dept: REHABILITATION | Facility: HOSPITAL | Age: 49
End: 2024-08-21
Payer: COMMERCIAL

## 2024-08-21 DIAGNOSIS — M80.80XA PATHOLOGICAL FRACTURE DUE TO OSTEOPOROSIS, UNSPECIFIED FRACTURE SITE, UNSPECIFIED OSTEOPOROSIS TYPE, INITIAL ENCOUNTER: Primary | ICD-10-CM

## 2024-08-21 DIAGNOSIS — Z98.890 POST-OPERATIVE STATE: ICD-10-CM

## 2024-08-21 DIAGNOSIS — S82.042D CLOSED DISPLACED COMMINUTED FRACTURE OF LEFT PATELLA WITH ROUTINE HEALING, SUBSEQUENT ENCOUNTER: ICD-10-CM

## 2024-08-21 DIAGNOSIS — S82.042D CLOSED DISPLACED COMMINUTED FRACTURE OF LEFT PATELLA WITH ROUTINE HEALING, SUBSEQUENT ENCOUNTER: Primary | ICD-10-CM

## 2024-08-21 DIAGNOSIS — M81.6 LOCALIZED OSTEOPOROSIS OF LEQUESNE: ICD-10-CM

## 2024-08-21 DIAGNOSIS — M81.0 OSTEOPOROSIS, UNSPECIFIED OSTEOPOROSIS TYPE, UNSPECIFIED PATHOLOGICAL FRACTURE PRESENCE: ICD-10-CM

## 2024-08-21 DIAGNOSIS — Z74.09 IMPAIRED FUNCTIONAL MOBILITY, BALANCE, GAIT, AND ENDURANCE: Primary | ICD-10-CM

## 2024-08-21 PROCEDURE — 99999 PR PBB SHADOW E&M-EST. PATIENT-LVL III: CPT | Mod: PBBFAC,,, | Performed by: PHYSICIAN ASSISTANT

## 2024-08-21 PROCEDURE — 97110 THERAPEUTIC EXERCISES: CPT

## 2024-08-21 PROCEDURE — 73560 X-RAY EXAM OF KNEE 1 OR 2: CPT | Mod: 26,LT,, | Performed by: RADIOLOGY

## 2024-08-21 PROCEDURE — 99024 POSTOP FOLLOW-UP VISIT: CPT | Mod: S$GLB,,, | Performed by: NURSE PRACTITIONER

## 2024-08-21 PROCEDURE — 99999 PR PBB SHADOW E&M-EST. PATIENT-LVL III: CPT | Mod: PBBFAC,,, | Performed by: NURSE PRACTITIONER

## 2024-08-21 PROCEDURE — 73560 X-RAY EXAM OF KNEE 1 OR 2: CPT | Mod: TC,LT

## 2024-08-21 PROCEDURE — 3044F HG A1C LEVEL LT 7.0%: CPT | Mod: CPTII,S$GLB,, | Performed by: NURSE PRACTITIONER

## 2024-08-21 NOTE — PROGRESS NOTES
Ms. Abrams is here today for a post-operative visit after undergoing the following:    Open reduction internal fixation left patella fracture      by Dr. Mullins on 7/8/2024.    Interval History:  She reports that she is doing ok.  She states their pain is tolerable.  She reports she is having more muscle spasms.  She is using Robaxin.  She is taking pain medication.  She is currently going to PT.  She reports she has remain compliant with wearing her T scope brace.  She denies any falls or injuries since surgery/last seen in the clinic.  She denies fever, chills, and sweats since the time of the surgery.     Physical exam:  The patient's incision is open air and healed.  She has tactile stimulation to their lower leg, she denies calf pain, there is no leg edema and their pedal pulse is palpable x 2.  Muscle strength to the left lower leg is 3/5.  Knee Range of motion is 10° to 40°.    RADS:  Left knee x-ray was obtained, findings show patella fracture appears to be stable.  No other fractures noted.    Assessment:  Post-op visit (6 weeks)    Plan:    ICD-10-CM ICD-9-CM   1. Closed displaced comminuted fracture of left patella with routine healing, subsequent encounter  S82.042D V54.16   2. Post-operative state  Z98.890 V45.89     Current care, treatment plan, precautions, activity level/ modifications, limitations, rehabilitation exercises and proposed future treatment were discussed with the patient. We discussed the need to monitor for changes in symptoms and condition and report them to the physician.  Discussed importance of compliance with all appointments and follow up examinations.     PHYSICAL THERAPY:   - Physical therapy will refer to [facility] in [place] - [phone].  - Weight bearing as below  - Range of motion as below    Phase I Week 0-2 - done  -Knee immobilizer: Worn at all times - taken off for only physical therapy sessions converted to hinged knee brace at first post-op visit.  -Weightbearing:  Weight bear as tolerated with the knees locked in extension.  -ROM: AROM/AAROM/PROM 0-30 degrees  -Therapeutic exercises Isometric quadriceps/hamstring/adductor/abductor strengthening, ankle teraband exercises.    Phase II Week 2-6 - done  -Knee brace with weightbearing activities in locked in full extension ok to remove at night.  -Weightbearing: ROM AROM/AAROM/PROM - add 15 degrees of flexion each week- goal is 90 degrees by week 6  -Therapeutic exercises Isometric quadriceps/hamstring/adductor/abductor strengthening, ankle teraband exercises, initiate straight leg raises.    Phase III 6-10 weeks - start  -Knee brace: unlocked - worn with weightbearing activities  -Weightbearing full  -ROM AROM/AAROM/PROM - progress to full ROM by week 10  -Therapeutic exercises Isometric quadriceps/hamstring/adductor/abductor strengthening, ankle teraband exercises, initiate straight leg raises.    Phase IV: 10-12 weeks  -Discontinue knee brace  -Full weightbearing  -Full ROM  -Therapeutic exercises Isometric quadriceps/hamstring/adductor/abductor strengthening, ankle teraband exercises, initiate straight leg raises, start stationary bike.    Phase V: 3-6 months  -Return to full activities as tolerated.       PAIN MEDICATION:   - Pain medication: refill was not needed  - Pain medication refill policy provided to patient for review, yes.    - Patient was informed a multi-modal approach is used to treat their pain.     DVT PROPHYLAXIS:   - ASA 81 mg bid x 8 weeks    FRAGILITY:  - Patient has been referred to the fracture clinic.     FOLLOW UP:   - Patient will follow up in the clinic in 6 weeks.  - X-ray of her left knee is needed.    - Future Appointments:   Future Appointments   Date Time Provider Department Center   8/26/2024  8:00 AM Lyly Carmona PT [facility]   8/28/2024  8:00 AM Lyly Carmona PT [facility]   10/2/2024 10:45 AM Rustam Garza NP [facility]       If  there are any questions prior to scheduled follow up, the patient was instructed to contact the office

## 2024-08-21 NOTE — PROGRESS NOTES
[facility]   Physical Therapy Treatment Note      Name: Catalina Abrams  Clinic Number: [id number]    Therapy Diagnosis: Impaired functional mobility, balance, gait, and endurance  Physician: Rustam Garza NP    Visit Date: 8/21/2024    Physician Orders: PT Eval and Treat   Medical Diagnosis from Referral: Open reduction internal fixation left patella fracture  Evaluation Date: 8/2/2024  Authorization Period Expiration: 8/2/2025  Plan of Care Expiration: 11/2/2024  Progress Note Due: 9/14/24  Date of Surgery: 7/8/24  Visit # / Visits authorized: 7/12   FOTO: 1/ 3      Time In: 8:00  Time Out: 8:45  Total Billable Time: 45 minutes  PTA Visit #: 0/5     Precautions: Phase I Week 0-2  -Knee immobilizer: Worn at all times - taken off for only physical therapy sessions converted to hinged knee brace at first post-op visit.  -Weightbearing: Weight bear as tolerated with the knees locked in extension.  -ROM: AROM/AAROM/PROM 0-30 degrees  -Therapeutic exercises Isometric quadriceps/hamstring/adductor/abductor strengthening, ankle teraband exercises.     Phase II Week 2-6  -Knee brace with weightbearing activities in locked in full extension ok to remove at night.  -Weightbearing: ROM AROM/AAROM/PROM - add 15 degrees of flexion each week- goal is 90 degrees by week 6  -Therapeutic exercises Isometric quadriceps/hamstring/adductor/abductor strengthening, ankle theraband exercises, initiate straight leg raises.     Phase III 6-10 weeks  -Knee brace: unlocked - worn with weightbearing activities  -Weightbearing full  -ROM AROM/AAROM/PROM - progress to full ROM by week 10  -Therapeutic exercises Isometric quadriceps/hamstring/adductor/abductor strengthening, ankle teraband exercises, initiate straight leg raises.     Phase IV: 10-12 weeks  -Discontinue knee brace  -Full weightbearing  -Full ROM  -Therapeutic exercises Isometric quadriceps/hamstring/adductor/abductor strengthening, ankle teraband  exercises, initiate straight leg raises, start stationary bike.     Phase V: 3-6 months  -Return to full activities as tolerated.      Subjective     Patient reports: she is continuing with muscle spasms of her right quads and has an appointment with her surgeon on 8/21/2024.  She was compliant with home exercise program.  Response to previous treatment: good  Functional change: none    Pain: 3/10  Location: left knee      Objective      AAROM right knee flexion 45 degrees    Treatment     Trang received the treatments listed below:      therapeutic exercises to develop strength, endurance, ROM, and flexibility for 45 minutes including:  Quad sets x 15  Glut sets x 15  Knee flex (AROM/PROM) 45 degrees x 15   Hamstring sets x 15  Hip abd/add supine x 15  SLR's (initiate with QS) x 15  SAQ's x 10  Hip adductor with ball x 15    therapeutic activities to improve functional performance for 0  minutes, including:    gait training to improve functional mobility and safety for 0  minutes, including:  Brace was unlocked to 90 degrees per protocol, and patient ambulated 25' with axillary crutches.        Patient Education and Home Exercises       Education provided:   - Quad sets, hamstring sets    Written Home Exercises Provided: Yes. Exercises were reviewed and Trang was able to demonstrate them prior to the end of the session.  Trang demonstrated good  understanding of the education provided. See Electronic Medical Record under Patient Instructions for exercises provided during therapy sessions    Assessment     Catalina is a 48 y.o. female referred to outpatient Physical Therapy with a medical diagnosis of Open reduction internal fixation left patella fracture. Patient presents with impaired gait, knee pain, decreased functional activity tolerance.     Trang Is progressing well towards her goals.   Patient prognosis is Good.     Patient will continue to benefit from skilled outpatient physical therapy to address the  deficits listed in the problem list box on initial evaluation, provide pt/family education and to maximize pt's level of independence in the home and community environment.     Patient's spiritual, cultural and educational needs considered and pt agreeable to plan of care and goals.     Anticipated barriers to physical therapy: 0    Goals: Short Term Goals: 3 weeks   1.Compliant with HEP.  2.Reduction in pain to no more than 4/10 for improved ability to perform daily activities.  3.Increase left knee ROM per appropriate protocol.  4.Increase left knee strength to 3+/5.     Long Term Goals: 6 weeks   1.Independent with HEP.  2.Reduction in pain to no more than 2/10 for improved ability to perform daily activities.  3.Increase left knee ROM per appropriate protocol.  4.Increase left knee strength to 4+/5.  5.Ambulate independently with LRD.  6.FOTO score improved to <20%.    Plan     Plan of care Certification: 8/2/2024 to 11/2/2024.     Outpatient Physical Therapy 2 times weekly for 6 weeks to include the following interventions: Gait Training, Manual Therapy, Neuromuscular Re-ed, Patient Education, Therapeutic Activities, and Therapeutic Exercise.     Lyly Carmona, PT

## 2024-08-21 NOTE — PROGRESS NOTES
SUBJECTIVE:     Chief Complaint & History of Present Illness:  Catalina Abrams is a new patient 48 y.o. female who is seen here today for a bone health evaluation for osteoporosis, fracture prevention, and risk evaluation for future fractures.   she is accompanied by   today who is helpful with her history.     she was appropriately identified and referred by Rustam Garza NP due to concerns for compromised bone quality, and risk of future fractures.  This visit is medically necessary to identify risk, investigate and initiative treatment as appropriate to improve bone quality and strength for the reduction of secondary fractures.     her medical history, medications and fracture history will be reviewed and summarized      Review of patient's allergies indicates:  No Known Allergies      Current Outpatient Medications   Medication Sig Dispense Refill    acetaminophen (TYLENOL) 500 MG tablet Take 2 tablets (1,000 mg total) by mouth every 6 (six) hours.      aspirin (ECOTRIN) 81 MG EC tablet Take 1 tablet (81 mg total) by mouth 2 (two) times a day. End date September 3 2024      baclofen (LIORESAL) 10 MG tablet Take 1 tablet (10 mg total) by mouth 3 (three) times daily. 90 tablet 11    oxyCODONE (ROXICODONE) 5 MG immediate release tablet Take 1 tablet (5 mg total) by mouth every 4 (four) hours as needed (pain scale 7-10). 40 tablet 0    senna-docusate 8.6-50 mg (PERICOLACE) 8.6-50 mg per tablet Take 1 tablet by mouth 2 (two) times daily. 30 tablet 0    omeprazole (PRILOSEC) 20 MG capsule Take 1 capsule (20 mg total) by mouth once daily. 30 capsule 0     No current facility-administered medications for this visit.       Past Medical History:   Diagnosis Date    Known health problems: none        Past Surgical History:   Procedure Laterality Date    APPENDECTOMY       SECTION      HYSTEROSCOPY WITH DILATION AND CURETTAGE OF UTERUS N/A 8/15/2018    Procedure: HYSTEROSCOPY, WITH DILATION AND  CURETTAGE OF UTERUS;  Surgeon: Elder Salazar MD;  Location: [facility] OR;  Service: OB/GYN;  Laterality: N/A;    OPEN REDUCTION AND INTERNAL FIXATION (ORIF) OF FRACTURE OF PATELLA Left 7/8/2024    Procedure: ORIF, FRACTURE, PATELLA - LEFT;  Surgeon: Tres Mullins MD;  Location: [facility] OR [address] [place] [address];  Service: Orthopedics;  Laterality: Left;  C-arm clockside, Ancef    THERMAL ABLATION OF ENDOMETRIUM N/A 8/15/2018    Procedure: ABLATION, ENDOMETRIUM, THERMAL;  Surgeon: Elder Salazar MD;  Location: [facility] OR;  Service: OB/GYN;  Laterality: N/A;    TUBAL LIGATION  1998       Lab Results   Component Value Date     08/21/2024    K 4.0 08/21/2024     08/21/2024    CO2 27 08/21/2024    GLU 92 08/21/2024    BUN 13 08/21/2024    CREATININE 0.8 08/21/2024    CALCIUM 10.4 08/21/2024    PROT 7.5 08/21/2024    ALBUMIN 4.1 08/21/2024    BILITOT 0.2 08/21/2024    ALKPHOS 86 08/21/2024    AST 14 08/21/2024    ALT 14 08/21/2024    ANIONGAP 4 (L) 08/21/2024      Lab Results   Component Value Date    WBC 10.63 07/07/2024    RBC 4.37 07/07/2024    HGB 13.7 07/07/2024    HCT 43.0 07/07/2024    MCV 98 07/07/2024    MCH 31.4 (H) 07/07/2024    MCHC 31.9 (L) 07/07/2024    RDW 12.9 07/07/2024     07/07/2024    MPV 12.1 07/07/2024    GRAN 6.9 07/07/2024    GRAN 64.5 07/07/2024    LYMPH 2.8 07/07/2024    LYMPH 26.4 07/07/2024    MONO 0.8 07/07/2024    MONO 7.6 07/07/2024    EOS 0.1 07/07/2024    BASO 0.03 07/07/2024    EOSINOPHIL 0.9 07/07/2024    BASOPHIL 0.3 07/07/2024    DIFFMETHOD Automated 07/07/2024      Lab Results   Component Value Date    MG 2.3 07/06/2024      Lab Results   Component Value Date    PHOS 2.5 (L) 07/06/2024      Lab Results   Component Value Date    CFMMFVLH37DD 41 07/06/2024      Lab Results   Component Value Date    .7 (H) 08/21/2024      Lab Results   Component Value Date    TSH 2.313 08/21/2024      Lab Results   Component Value Date    FREET4 0.83 08/21/2024      Lab Results  "  Component Value Date    HGBA1C 5.0 07/06/2024    ESTIMATEDAVG 97 07/06/2024      No results found for: "FREETESTOSTE"         Vital Signs (Most Recent)  There were no vitals filed for this visit.      Today's Visit and Bone Health History     Have you had any loss of height or gotten shorter since your 20's? 0   Are you postmenopausal? No   Have you ever taken any type of hormone replacement therapy? No   Do you currently smoke? Yes   If yes, how many years? 20   How many alcoholic beverages do you drink per day? 0   How many caffeinated beverages do you drink per day? 1 to 3   Have you had 2 or more falls in the past 12 months? Yes   How active have you been in the past 12 months? Somewhat active ( walk up to 1 mile per day )   Did either of your parents have a hip fracture after the age of 50? Yes   Have you ever been diagnosed with any of the following? Diabetes Mellitus   Do you currently have a fracture? No   Have you broken any other bones since you turned 52 or older? No   Have you ever had a bone density test or DXA scan? No   Are you currently taking or have you ever taken any of the following medications? None   Do you take Calcium? No   Do you take Vitamin D? No   Have you ever been diagnosed with cancer? No   Have you ever been treated for cancer with high beam radiation or had radioactive implants? No            she has medical history and medication use known to be associated with bone loss and osteoporosis to include low energy patellar fracture diabetes iron deficiency anemia.         Review of Systems:  ROS:  Constitutional: no fever or chills  Eyes: no visual changes  ENT: no nasal congestion or sore throat  Respiratory: no respiratory symptoms  Cardiovascular: no chest pain or palpitations  Gastrointestinal: no nausea or vomiting, tolerating diet  Genitourinary: no hematuria or dysuria, endometrial polyps, menorrhalgia  Integument/Breast: no rash or pruritis  Hematologic/Lymphatic: no easy " bruising or lymphadenopathy, iron deficiency anemia  Musculoskeletal: no arthralgias or myalgias, closed left patellar fracture  Neurological: no seizures or tremors  Behavioral/Psych: no auditory or visual hallucinations  Endocrine: no heat or cold intolerance      OBJECTIVE:     PHYSICAL EXAM:     ,                  General: no acute distress and well developed/well nourished  Behavioral/Psych: normal judgment and insight and normal mood/affect  Skin: no rash  Head/Neck: atraumatic, normocephalic, without obvious abnormality  Respiratory: normal respiratory effort  Cardiac: regular rate and rhythm  Vascular: pulses present  Abdomen: soft, non-tender  Musculoskeletal: no joint tenderness, deformity or swelling               ASSESSMENT/PLAN:     Assessment:    Osteoporosis, at high risk for future fractures, history of low energy patella fracture.    Plan:   30-45 minutes spent in face-to-face consultation with patient and her family members today, discussing the disease management of osteoporosis, for the reduction of future fractures.  I have explained that bone strength is equal to bone quality. A bone density / DEXA scan is important to complement her care since her fracture was by definition a fragility fracture/traumatic fracture.  Over half of the encounter was spent (50%) counseling the patient on the disease of osteoporosis evidence-based and best practice treatment options available as well as recommendations for improvement of bone quality, the importance of nutritional supplements to include:  Calcium 9797-8921 mg daily in divided doses   Vitamin D3  4465-8790 units daily in divided doses.   Fall prevention and personal safety for the reduction of future fractures.    Clinicians Guidelines for the treatment of Osteoporosis 2023 as part of the National Osteoporosis Foundation were utilized as the evidence-based information provided.    Discussed pharmaceutical treatment options to include Biphosphatases,  Denosumab, Abaloparatide and Teriparatide. Information to include indications for therapy, risks and benefits to treatment, and important safety information related to these treatments was provided and discussed.  Handouts were given to the patient for her review on osteoporosis and other pharmacological treatment information related to other available treatment options.    The importance of diet, impact exercise, and core strengthening with gait and balance exercise, through  both formal physical therapy programs and home exercise programs was discussed.     Bone density test recommended and ordered  Bone health labs recommended and ordered    We will see her back following testing discuss treatment options

## 2024-08-22 DIAGNOSIS — M81.0 OSTEOPOROSIS, UNSPECIFIED OSTEOPOROSIS TYPE, UNSPECIFIED PATHOLOGICAL FRACTURE PRESENCE: ICD-10-CM

## 2024-08-22 DIAGNOSIS — M81.6 LOCALIZED OSTEOPOROSIS OF LEQUESNE: ICD-10-CM

## 2024-08-22 DIAGNOSIS — M80.80XA PATHOLOGICAL FRACTURE DUE TO OSTEOPOROSIS, UNSPECIFIED FRACTURE SITE, UNSPECIFIED OSTEOPOROSIS TYPE, INITIAL ENCOUNTER: Primary | ICD-10-CM

## 2024-08-26 ENCOUNTER — CLINICAL SUPPORT (OUTPATIENT)
Dept: REHABILITATION | Facility: HOSPITAL | Age: 49
End: 2024-08-26
Payer: COMMERCIAL

## 2024-08-26 DIAGNOSIS — Z74.09 IMPAIRED FUNCTIONAL MOBILITY, BALANCE, GAIT, AND ENDURANCE: Primary | ICD-10-CM

## 2024-08-26 PROCEDURE — 97110 THERAPEUTIC EXERCISES: CPT

## 2024-08-26 NOTE — PROGRESS NOTES
[facility]   Physical Therapy Treatment Note      Name: Catalina Abrams  Clinic Number: [id number]    Therapy Diagnosis: Impaired functional mobility, balance, gait, and endurance  Physician: Rustam Garza NP    Visit Date: 8/26/2024    Physician Orders: PT Eval and Treat   Medical Diagnosis from Referral: Open reduction internal fixation left patella fracture  Evaluation Date: 8/2/2024  Authorization Period Expiration: 8/2/2025  Plan of Care Expiration: 11/2/2024  Progress Note Due: 9/14/24  Date of Surgery: 7/8/24  Visit # / Visits authorized: 8/12   FOTO: 1/ 3      Time In: 8:00  Time Out: 8:45  Total Billable Time: 45 minutes  PTA Visit #: 0/5     Precautions: Phase I Week 0-2  -Knee immobilizer: Worn at all times - taken off for only physical therapy sessions converted to hinged knee brace at first post-op visit.  -Weightbearing: Weight bear as tolerated with the knees locked in extension.  -ROM: AROM/AAROM/PROM 0-30 degrees  -Therapeutic exercises Isometric quadriceps/hamstring/adductor/abductor strengthening, ankle teraband exercises.     Phase II Week 2-6  -Knee brace with weightbearing activities in locked in full extension ok to remove at night.  -Weightbearing: ROM AROM/AAROM/PROM - add 15 degrees of flexion each week- goal is 90 degrees by week 6  -Therapeutic exercises Isometric quadriceps/hamstring/adductor/abductor strengthening, ankle theraband exercises, initiate straight leg raises.     Phase III 6-10 weeks  -Knee brace: unlocked - worn with weightbearing activities  -Weightbearing full  -ROM AROM/AAROM/PROM - progress to full ROM by week 10  -Therapeutic exercises Isometric quadriceps/hamstring/adductor/abductor strengthening, ankle teraband exercises, initiate straight leg raises.     Phase IV: 10-12 weeks  -Discontinue knee brace  -Full weightbearing  -Full ROM  -Therapeutic exercises Isometric quadriceps/hamstring/adductor/abductor strengthening, ankle teraband  exercises, initiate straight leg raises, start stationary bike.     Phase V: 3-6 months  -Return to full activities as tolerated.      Subjective     Patient reports: she went to her orthopedist last week and stated that is doing well since her surgery.  She has a restorator at home, and has been using it. (Her orthopedist approved the use of it.)  She was compliant with home exercise program.  Response to previous treatment: good  Functional change: none    Pain: 4/10  Location: left knee      Objective      AAROM right knee flexion 60 degrees    Treatment     Trang received the treatments listed below:      therapeutic exercises to develop strength, endurance, ROM, and flexibility for 45 minutes including:  Sci-fit x 10 min  Quad sets x 15  Glut sets x 15  Knee flex (AROM/PROM) 45 degrees x 15   Hamstring sets x 15  Hip abd/add supine x 15  SLR's (initiate with QS) x 15  SAQ's x 10  Hip adductor with ball x 15    therapeutic activities to improve functional performance for 0  minutes, including:    gait training to improve functional mobility and safety for 0  minutes, including:  Brace was unlocked to 90 degrees per protocol, and patient ambulated 25' with axillary crutches.        Patient Education and Home Exercises       Education provided:   - Quad sets, hamstring sets    Written Home Exercises Provided: Yes. Exercises were reviewed and Trang was able to demonstrate them prior to the end of the session.  Trang demonstrated good  understanding of the education provided. See Electronic Medical Record under Patient Instructions for exercises provided during therapy sessions    Assessment     Catalina is a 48 y.o. female referred to outpatient Physical Therapy with a medical diagnosis of Open reduction internal fixation left patella fracture. Patient presents with impaired gait, knee pain, decreased functional activity tolerance.     Trang Is progressing well towards her goals.   Patient prognosis is Good.      Patient will continue to benefit from skilled outpatient physical therapy to address the deficits listed in the problem list box on initial evaluation, provide pt/family education and to maximize pt's level of independence in the home and community environment.     Patient's spiritual, cultural and educational needs considered and pt agreeable to plan of care and goals.     Anticipated barriers to physical therapy: 0    Goals: Short Term Goals: 3 weeks   1.Compliant with HEP.  2.Reduction in pain to no more than 4/10 for improved ability to perform daily activities.  3.Increase left knee ROM per appropriate protocol.  4.Increase left knee strength to 3+/5.     Long Term Goals: 6 weeks   1.Independent with HEP.  2.Reduction in pain to no more than 2/10 for improved ability to perform daily activities.  3.Increase left knee ROM per appropriate protocol.  4.Increase left knee strength to 4+/5.  5.Ambulate independently with LRD.  6.FOTO score improved to <20%.    Plan     Plan of care Certification: 8/2/2024 to 11/2/2024.     Outpatient Physical Therapy 2 times weekly for 6 weeks to include the following interventions: Gait Training, Manual Therapy, Neuromuscular Re-ed, Patient Education, Therapeutic Activities, and Therapeutic Exercise.     Lyly Carmona, PT

## 2024-09-10 ENCOUNTER — CLINICAL SUPPORT (OUTPATIENT)
Dept: REHABILITATION | Facility: HOSPITAL | Age: 49
End: 2024-09-10
Payer: COMMERCIAL

## 2024-09-10 DIAGNOSIS — Z74.09 IMPAIRED FUNCTIONAL MOBILITY, BALANCE, GAIT, AND ENDURANCE: Primary | ICD-10-CM

## 2024-09-10 PROCEDURE — 97110 THERAPEUTIC EXERCISES: CPT

## 2024-09-10 NOTE — PROGRESS NOTES
[facility]   Physical Therapy Treatment Note      Name: Catalina Abrams  Clinic Number: [id number]    Therapy Diagnosis: Impaired functional mobility, balance, gait, and endurance  Physician: Rustam Garza NP    Visit Date: 9/10/2024    Physician Orders: PT Eval and Treat   Medical Diagnosis from Referral: Open reduction internal fixation left patella fracture  Evaluation Date: 8/2/2024  Authorization Period Expiration: 8/2/2025  Plan of Care Expiration: 11/2/2024  Progress Note Due: 9/14/24  Date of Surgery: 7/8/24  Visit # / Visits authorized: 9/12   FOTO: 1/ 3      Time In: 12:30  Time Out: 1:25  Total Billable Time: 45 minutes  PTA Visit #: 0/5     Precautions: Phase I Week 0-2  -Knee immobilizer: Worn at all times - taken off for only physical therapy sessions converted to hinged knee brace at first post-op visit.  -Weightbearing: Weight bear as tolerated with the knees locked in extension.  -ROM: AROM/AAROM/PROM 0-30 degrees  -Therapeutic exercises Isometric quadriceps/hamstring/adductor/abductor strengthening, ankle teraband exercises.     Phase II Week 2-6  -Knee brace with weightbearing activities in locked in full extension ok to remove at night.  -Weightbearing: ROM AROM/AAROM/PROM - add 15 degrees of flexion each week- goal is 90 degrees by week 6  -Therapeutic exercises Isometric quadriceps/hamstring/adductor/abductor strengthening, ankle theraband exercises, initiate straight leg raises.     Phase III 6-10 weeks  -Knee brace: unlocked - worn with weightbearing activities  -Weightbearing full  -ROM AROM/AAROM/PROM - progress to full ROM by week 10  -Therapeutic exercises Isometric quadriceps/hamstring/adductor/abductor strengthening, ankle teraband exercises, initiate straight leg raises.     Phase IV: 10-12 weeks  -Discontinue knee brace  -Full weightbearing  -Full ROM  -Therapeutic exercises Isometric quadriceps/hamstring/adductor/abductor strengthening, ankle teraband  exercises, initiate straight leg raises, start stationary bike.     Phase V: 3-6 months  -Return to full activities as tolerated.      Subjective     Patient reports: she went to her orthopedist last week and stated that is doing well since her surgery.  She has a restorator at home, and has been using it. (Her orthopedist approved the use of it.)  She was compliant with home exercise program.  Response to previous treatment: good  Functional change: none    Pain: 5/10  Location: left knee      Objective      AROM right knee flexion 80 degrees    Treatment     Trang received the treatments listed below:      therapeutic exercises to develop strength, endurance, ROM, and flexibility for 45 minutes including:  Sci-fit x 10 min  Quad sets x 15  Glut sets x 15  Knee flex (AROM/PROM) 45 degrees x 15   Hamstring sets x 15  Hip abduction x 15 reps  SLR's (initiate with QS) x 15  SAQ's x 15  Hip adductor with ball x 15    therapeutic activities to improve functional performance for 0  minutes, including:    gait training to improve functional mobility and safety for 0  minutes, including:  Brace was unlocked to 110 degrees per protocol, and patient ambulated 25' with axillary crutches.  Trang states she has a rollator at home and has been ambulating with it.        Patient Education and Home Exercises       Education provided:   - Quad sets, hamstring sets    Written Home Exercises Provided: Yes. Exercises were reviewed and Trang was able to demonstrate them prior to the end of the session.  Trang demonstrated good  understanding of the education provided. See Electronic Medical Record under Patient Instructions for exercises provided during therapy sessions    Assessment     Catalina is a 48 y.o. female referred to outpatient Physical Therapy with a medical diagnosis of Open reduction internal fixation left patella fracture. Patient presents with impaired gait, knee pain, decreased functional activity tolerance.     Trang  Is progressing well towards her goals.   Patient prognosis is Good.     Patient will continue to benefit from skilled outpatient physical therapy to address the deficits listed in the problem list box on initial evaluation, provide pt/family education and to maximize pt's level of independence in the home and community environment.     Patient's spiritual, cultural and educational needs considered and pt agreeable to plan of care and goals.     Anticipated barriers to physical therapy: 0    Goals: Short Term Goals: 3 weeks   1.Compliant with HEP.  2.Reduction in pain to no more than 4/10 for improved ability to perform daily activities.  3.Increase left knee ROM per appropriate protocol.  4.Increase left knee strength to 3+/5.     Long Term Goals: 6 weeks   1.Independent with HEP.  2.Reduction in pain to no more than 2/10 for improved ability to perform daily activities.  3.Increase left knee ROM per appropriate protocol.  4.Increase left knee strength to 4+/5.  5.Ambulate independently with LRD.  6.FOTO score improved to <20%.    Plan     Plan of care Certification: 8/2/2024 to 11/2/2024.     Outpatient Physical Therapy 2 times weekly for 6 weeks to include the following interventions: Gait Training, Manual Therapy, Neuromuscular Re-ed, Patient Education, Therapeutic Activities, and Therapeutic Exercise.     Lyly Carmona, PT

## 2024-09-13 ENCOUNTER — HOSPITAL ENCOUNTER (OUTPATIENT)
Dept: RADIOLOGY | Facility: HOSPITAL | Age: 49
Discharge: HOME | End: 2024-09-13
Attending: PHYSICIAN ASSISTANT
Payer: COMMERCIAL

## 2024-09-13 ENCOUNTER — CLINICAL SUPPORT (OUTPATIENT)
Dept: REHABILITATION | Facility: HOSPITAL | Age: 49
End: 2024-09-13
Payer: COMMERCIAL

## 2024-09-13 DIAGNOSIS — Z74.09 IMPAIRED FUNCTIONAL MOBILITY, BALANCE, GAIT, AND ENDURANCE: Primary | ICD-10-CM

## 2024-09-13 DIAGNOSIS — M81.0 OSTEOPOROSIS, UNSPECIFIED OSTEOPOROSIS TYPE, UNSPECIFIED PATHOLOGICAL FRACTURE PRESENCE: ICD-10-CM

## 2024-09-13 DIAGNOSIS — M80.80XA PATHOLOGICAL FRACTURE DUE TO OSTEOPOROSIS, UNSPECIFIED FRACTURE SITE, UNSPECIFIED OSTEOPOROSIS TYPE, INITIAL ENCOUNTER: ICD-10-CM

## 2024-09-13 DIAGNOSIS — S82.042A CLOSED DISPLACED COMMINUTED FRACTURE OF LEFT PATELLA, INITIAL ENCOUNTER: ICD-10-CM

## 2024-09-13 DIAGNOSIS — M81.6 LOCALIZED OSTEOPOROSIS OF LEQUESNE: ICD-10-CM

## 2024-09-13 PROCEDURE — 77091 TBS TECHL CALCULATION ONLY: CPT

## 2024-09-13 PROCEDURE — 77092 TBS I&R FX RSK QHP: CPT | Mod: ,,, | Performed by: RADIOLOGY

## 2024-09-13 PROCEDURE — 97530 THERAPEUTIC ACTIVITIES: CPT

## 2024-09-13 PROCEDURE — 77080 DXA BONE DENSITY AXIAL: CPT | Mod: 26,,, | Performed by: RADIOLOGY

## 2024-09-13 NOTE — PROGRESS NOTES
[facility]   Physical Therapy Treatment Note      Name: Catalina Abrams  Clinic Number: [id number]    Therapy Diagnosis: Impaired functional mobility, balance, gait, and endurance  Physician: Rustam Garza NP    Visit Date: 9/13/2024    Physician Orders: PT Eval and Treat   Medical Diagnosis from Referral: Open reduction internal fixation left patella fracture  Evaluation Date: 8/2/2024  Authorization Period Expiration: 8/2/2025  Plan of Care Expiration: 11/2/2024  Progress Note Due: 9/14/24  Date of Surgery: 7/8/24  Visit # / Visits authorized: 10/12   FOTO: 1/ 3      Time In: 2:40 pm  Time Out: 3:40 pm  Total Billable Time: 40 minutes Remainder of time supervised with 1 Medicare patient  PTA Visit #: 0/5     Precautions: Phase I Week 0-2  -Knee immobilizer: Worn at all times - taken off for only physical therapy sessions converted to hinged knee brace at first post-op visit.  -Weightbearing: Weight bear as tolerated with the knees locked in extension.  -ROM: AROM/AAROM/PROM 0-30 degrees  -Therapeutic exercises Isometric quadriceps/hamstring/adductor/abductor strengthening, ankle teraband exercises.     Phase II Week 2-6  -Knee brace with weightbearing activities in locked in full extension ok to remove at night.  -Weightbearing: ROM AROM/AAROM/PROM - add 15 degrees of flexion each week- goal is 90 degrees by week 6  -Therapeutic exercises Isometric quadriceps/hamstring/adductor/abductor strengthening, ankle theraband exercises, initiate straight leg raises.     Phase III 6-10 weeks  -Knee brace: unlocked - worn with weightbearing activities  -Weightbearing full  -ROM AROM/AAROM/PROM - progress to full ROM by week 10  -Therapeutic exercises Isometric quadriceps/hamstring/adductor/abductor strengthening, ankle teraband exercises, initiate straight leg raises.     Phase IV: 10-12 weeks  -Discontinue knee brace  -Full weightbearing  -Full ROM  -Therapeutic exercises Isometric  "quadriceps/hamstring/adductor/abductor strengthening, ankle teraband exercises, initiate straight leg raises, start stationary bike.     Phase V: 3-6 months  -Return to full activities as tolerated.      Subjective     Patient reports: she's been working her leg out as much as she can tolerate but it's still very stiff and weak. She reports pain has not improved nor worsened, but she feels like she should be turning a corner by now, but that has not happened. She arrives with her hinged brace donned and unlocked, asking when can she go without it. PT advises to continue wearing the unlocked brace, especially when ambulating in the community, due to poor quad strength and poor stability in the knee.   She was compliant with home exercise program.  Response to previous treatment: good  Functional change: none    Pain: 5/10  Location: left knee      Objective      AROM right knee flexion 90 degrees    Treatment     Trang received the treatments listed below:      therapeutic exercises to develop strength, endurance, ROM, and flexibility for 20 minutes including:  Sci-fit x 10 min- rocking back and forth with 5" hold at end range flexion.   Quad sets x 15  LAQ x 10- pain at end range extension  Reviewed knee flexion exercises including:   Knee flexion dangle- unable to tolerate   Supine/long-sit heel slides with strap   Seated heel slides     Not Performed:  Glut sets x 15  Knee flex (AROM/PROM) 45 degrees x 15   Hamstring sets x 15  Hip abduction x 15 reps  SLR's (initiate with QS) x 15  SAQ's x 15  Hip adductor with ball x 15    therapeutic activities to improve functional performance for 40 minutes, including:  Right toe taps 4" step to focus on weight bearing and weight shifting onto left lower extremity, cuing for glute and quad activation on left lower extremity   Fwd step up with left lower extremity onto 4" step  Lateral step up with left lower extremity onto 4" step  Pre-gait Unilateral stepping, with left " lower extremity being stance leg  Ambulating in // bars with focus on heel strike/toe off, increasing stride length  Ambulating in front of mirror with single point cane, focus on weight shift, stride length  Sit to stand from mat x 5    gait training to improve functional mobility and safety for 0  minutes, including:  Brace was unlocked to 110 degrees per protocol, and patient ambulated 25' with axillary crutches.  Trang states she has a rollator at home and has been ambulating with it.        Patient Education and Home Exercises       Education provided:   - Quad sets, hamstring sets    Written Home Exercises Provided: Yes. Exercises were reviewed and Trang was able to demonstrate them prior to the end of the session.  Trang demonstrated good  understanding of the education provided. See Electronic Medical Record under Patient Instructions for exercises provided during therapy sessions    Assessment     Patient apprehensive to ambulation due to fear of pain and decreased confidence in her left lower extremity strength. Patient continues to report moderate pain in the left knee into the quad with activity. She was able to increase her tolerance to standing and walking activities today. Requires cuing to improve quality of gait. Patient advised to continue wearing hinged brace due to poor quad strength and decreased stability in the knee. Patient verbalized understanding of all education provided this date.     Trang Is progressing well towards her goals.   Patient prognosis is Good.     Patient will continue to benefit from skilled outpatient physical therapy to address the deficits listed in the problem list box on initial evaluation, provide pt/family education and to maximize pt's level of independence in the home and community environment.     Patient's spiritual, cultural and educational needs considered and pt agreeable to plan of care and goals.     Anticipated barriers to physical therapy: 0    Goals:  Short Term Goals: 3 weeks   1.Compliant with HEP.  2.Reduction in pain to no more than 4/10 for improved ability to perform daily activities.  3.Increase left knee ROM per appropriate protocol.  4.Increase left knee strength to 3+/5.     Long Term Goals: 6 weeks   1.Independent with HEP.  2.Reduction in pain to no more than 2/10 for improved ability to perform daily activities.  3.Increase left knee ROM per appropriate protocol.  4.Increase left knee strength to 4+/5.  5.Ambulate independently with LRD.  6.FOTO score improved to <20%.    Plan     Plan of care Certification: 8/2/2024 to 11/2/2024.     Outpatient Physical Therapy 2 times weekly for 6 weeks to include the following interventions: Gait Training, Manual Therapy, Neuromuscular Re-ed, Patient Education, Therapeutic Activities, and Therapeutic Exercise.     Cristina Padron, PT

## 2024-09-16 ENCOUNTER — CLINICAL SUPPORT (OUTPATIENT)
Dept: REHABILITATION | Facility: HOSPITAL | Age: 49
End: 2024-09-16
Payer: COMMERCIAL

## 2024-09-16 DIAGNOSIS — Z74.09 IMPAIRED FUNCTIONAL MOBILITY, BALANCE, GAIT, AND ENDURANCE: Primary | ICD-10-CM

## 2024-09-16 PROCEDURE — 97530 THERAPEUTIC ACTIVITIES: CPT

## 2024-09-16 PROCEDURE — 97110 THERAPEUTIC EXERCISES: CPT

## 2024-09-16 NOTE — PROGRESS NOTES
[facility]   Physical Therapy Treatment Note      Name: Catalina Abrams  Clinic Number: [id number]    Therapy Diagnosis: Impaired functional mobility, balance, gait, and endurance  Physician: Rustam Garza NP    Visit Date: 9/16/2024    Physician Orders: PT Eval and Treat   Medical Diagnosis from Referral: Open reduction internal fixation left patella fracture  Evaluation Date: 8/2/2024  Authorization Period Expiration: 8/2/2025  Plan of Care Expiration: 11/2/2024  Progress Note Due: 9/14/24  Date of Surgery: 7/8/24  Visit # / Visits authorized: 10/12   FOTO: 1/ 3      Time In: 10:00  Time Out: 10:55  Total Billable Time: 55 minutes  PTA Visit #: 0/5     Precautions: Phase I Week 0-2  -Knee immobilizer: Worn at all times - taken off for only physical therapy sessions converted to hinged knee brace at first post-op visit.  -Weightbearing: Weight bear as tolerated with the knees locked in extension.  -ROM: AROM/AAROM/PROM 0-30 degrees  -Therapeutic exercises Isometric quadriceps/hamstring/adductor/abductor strengthening, ankle teraband exercises.     Phase II Week 2-6  -Knee brace with weightbearing activities in locked in full extension ok to remove at night.  -Weightbearing: ROM AROM/AAROM/PROM - add 15 degrees of flexion each week- goal is 90 degrees by week 6  -Therapeutic exercises Isometric quadriceps/hamstring/adductor/abductor strengthening, ankle theraband exercises, initiate straight leg raises.     Phase III 6-10 weeks  -Knee brace: unlocked - worn with weightbearing activities  -Weightbearing full  -ROM AROM/AAROM/PROM - progress to full ROM by week 10  -Therapeutic exercises Isometric quadriceps/hamstring/adductor/abductor strengthening, ankle teraband exercises, initiate straight leg raises.     Phase IV: 10-12 weeks  -Discontinue knee brace  -Full weightbearing  -Full ROM  -Therapeutic exercises Isometric quadriceps/hamstring/adductor/abductor strengthening, ankle  "teraband exercises, initiate straight leg raises, start stationary bike.     Phase V: 3-6 months  -Return to full activities as tolerated.      Subjective     Patient reports: she has decreased left knee pain since over the weekend.  She states she is not ambulating with an assistive device and feels safe.  Per protocol, knee brace discontinued.  She was compliant with home exercise program.  Response to previous treatment: good  Functional change: none    Pain: 2/10  Location: left knee      Objective      AROM right knee flexion 90 degrees    Treatment     Trang received the treatments listed below:      therapeutic exercises to develop strength, endurance, ROM, and flexibility for 20 minutes including:  Sci-fit x 10 min- rocking back and forth with 5" hold at end range flexion.   Quad sets x 15  LAQ x 10- pain at end range extension  Hamstring stretch long seat 3 x 30"             Glut sets x 15  Knee flex (AROM/PROM) 45 degrees x 15   Hamstring sets x 15  Hip abduction x 15 reps  SLR's x 15  SAQ's x 15  Hip adductor with ball x 15    therapeutic activities to improve functional performance for 35 minutes, including:  Right toe taps 4" step to focus on weight bearing and weight shifting onto left lower extremity, cuing for glute and quad activation on left lower extremity   Fwd step up with left lower extremity onto 4" step  Lateral step up with left lower extremity onto 4" step  Pre-gait Unilateral stepping, with left lower extremity being stance leg  Ambulating in // bars with focus on heel strike/toe off, increasing stride length  Ambulating in front of mirror with single point cane, focus on weight shift, stride length  Sit to stand from mat x 10      Patient Education and Home Exercises       Education provided:   - Quad sets, hamstring sets    Written Home Exercises Provided: Yes. Exercises were reviewed and Trang was able to demonstrate them prior to the end of the session.  Trang demonstrated good  " understanding of the education provided. See Electronic Medical Record under Patient Instructions for exercises provided during therapy sessions    Assessment     Patient apprehensive to ambulation due to fear of pain and decreased confidence in her left lower extremity strength. Patient continues to report moderate pain in the left knee into the quad with activity. She was able to increase her tolerance to standing and walking activities today. Requires cuing to improve quality of gait. Patient advised to continue wearing hinged brace due to poor quad strength and decreased stability in the knee. Patient verbalized understanding of all education provided this date.     Trang Is progressing well towards her goals.   Patient prognosis is Good.     Patient will continue to benefit from skilled outpatient physical therapy to address the deficits listed in the problem list box on initial evaluation, provide pt/family education and to maximize pt's level of independence in the home and community environment.     Patient's spiritual, cultural and educational needs considered and pt agreeable to plan of care and goals.     Anticipated barriers to physical therapy: 0    Goals: Short Term Goals: 3 weeks   1.Compliant with HEP.  2.Reduction in pain to no more than 4/10 for improved ability to perform daily activities.  3.Increase left knee ROM per appropriate protocol.  4.Increase left knee strength to 3+/5.     Long Term Goals: 6 weeks   1.Independent with HEP.  2.Reduction in pain to no more than 2/10 for improved ability to perform daily activities.  3.Increase left knee ROM per appropriate protocol.  4.Increase left knee strength to 4+/5.  5.Ambulate independently with LRD.  6.FOTO score improved to <20%.    Plan     Plan of care Certification: 8/2/2024 to 11/2/2024.     Outpatient Physical Therapy 2 times weekly for 6 weeks to include the following interventions: Gait Training, Manual Therapy, Neuromuscular Re-ed,  Patient Education, Therapeutic Activities, and Therapeutic Exercise.     Lyly Carmona, PT

## 2024-09-18 ENCOUNTER — CLINICAL SUPPORT (OUTPATIENT)
Dept: REHABILITATION | Facility: HOSPITAL | Age: 49
End: 2024-09-18
Payer: COMMERCIAL

## 2024-09-18 DIAGNOSIS — Z74.09 IMPAIRED FUNCTIONAL MOBILITY, BALANCE, GAIT, AND ENDURANCE: Primary | ICD-10-CM

## 2024-09-18 DIAGNOSIS — S82.042D CLOSED DISPLACED COMMINUTED FRACTURE OF LEFT PATELLA WITH ROUTINE HEALING, SUBSEQUENT ENCOUNTER: ICD-10-CM

## 2024-09-18 PROCEDURE — 97110 THERAPEUTIC EXERCISES: CPT

## 2024-09-18 PROCEDURE — 97530 THERAPEUTIC ACTIVITIES: CPT

## 2024-09-18 NOTE — PROGRESS NOTES
[facility]   Physical Therapy Treatment Note      Name: Catalina Abrams  Clinic Number: [id number]    Therapy Diagnosis: Impaired functional mobility, balance, gait, and endurance  Physician: Rustam Garza NP    Visit Date: 9/18/2024    Physician Orders: PT Eval and Treat   Medical Diagnosis from Referral: Open reduction internal fixation left patella fracture  Evaluation Date: 8/2/2024  Authorization Period Expiration: 8/2/2025  Plan of Care Expiration: 11/2/2024  Progress Note Due: 9/14/24  Date of Surgery: 7/8/24  Visit # / Visits authorized: 11/12   FOTO: 1/ 3      Time In: 11:00  Time Out: 11:55  Total Billable Time: 45 minutes  PTA Visit #: 0/5     Precautions: Phase I Week 0-2  -Knee immobilizer: Worn at all times - taken off for only physical therapy sessions converted to hinged knee brace at first post-op visit.  -Weightbearing: Weight bear as tolerated with the knees locked in extension.  -ROM: AROM/AAROM/PROM 0-30 degrees  -Therapeutic exercises Isometric quadriceps/hamstring/adductor/abductor strengthening, ankle teraband exercises.     Phase II Week 2-6  -Knee brace with weightbearing activities in locked in full extension ok to remove at night.  -Weightbearing: ROM AROM/AAROM/PROM - add 15 degrees of flexion each week- goal is 90 degrees by week 6  -Therapeutic exercises Isometric quadriceps/hamstring/adductor/abductor strengthening, ankle theraband exercises, initiate straight leg raises.     Phase III 6-10 weeks  -Knee brace: unlocked - worn with weightbearing activities  -Weightbearing full  -ROM AROM/AAROM/PROM - progress to full ROM by week 10  -Therapeutic exercises Isometric quadriceps/hamstring/adductor/abductor strengthening, ankle teraband exercises, initiate straight leg raises.     Phase IV: 10-12 weeks  -Discontinue knee brace  -Full weightbearing  -Full ROM  -Therapeutic exercises Isometric quadriceps/hamstring/adductor/abductor strengthening, ankle  "teraband exercises, initiate straight leg raises, start stationary bike.     Phase V: 3-6 months  -Return to full activities as tolerated.      Subjective     Patient reports: she thinks she overdid it at work. She has some stiffness and swelling today as well as pain on the superiomedial aspect of the knee. She is ambulating without the brace or assistive device at this time.  She was compliant with home exercise program.  Response to previous treatment: good  Functional change: none    Pain: 2/10  Location: left knee      Objective      AROM right knee flexion 106 deg    Treatment     Trang received the treatments listed below:      therapeutic exercises to develop strength, endurance, ROM, and flexibility for 20 minutes including:  Sci-fit x 10 min full revolutions  Quad sets x 15  LAQ 3 x 10  Hamstring stretch long seat 3 x 30"             Bridges x 20      Hamstrings curls with Physio-ball x 20  Hook-lying clams red thera-band 5"H x 20  SLR's x 15  Hip adductor with ball x 15    therapeutic activities to improve functional performance for 23 minutes, including:  Right toe taps 6" step to focus on weight bearing and weight shifting onto left lower extremity, cuing for glute and quad activation on left lower extremity   Fwd step up with left lower extremity onto 6" step x 20  Lateral step up with left lower extremity onto 4" step x 15  Side steps 20' x 2 laps  Sit to stand from mat x 10      Patient Education and Home Exercises       Education provided:   - Quad sets, hamstring sets    Written Home Exercises Provided: Yes. Exercises were reviewed and Trang was able to demonstrate them prior to the end of the session.  Trang demonstrated good  understanding of the education provided. See Electronic Medical Record under Patient Instructions for exercises provided during therapy sessions    Assessment     Patient continues to ambulate with antalgic gait pattern and decreased heel strike/toe off. She continues with " decreased quad control, but stable enough to ambulate safely without brace. Patient's ROM is progressing steadily. Still with pain at end range flexion but tolerable. Patient with good tolerance to treatment this date with progressions noted.     Trang Is progressing well towards her goals.   Patient prognosis is Good.     Patient will continue to benefit from skilled outpatient physical therapy to address the deficits listed in the problem list box on initial evaluation, provide pt/family education and to maximize pt's level of independence in the home and community environment.     Patient's spiritual, cultural and educational needs considered and pt agreeable to plan of care and goals.     Anticipated barriers to physical therapy: 0    Goals: Short Term Goals: 3 weeks   1.Compliant with HEP.  2.Reduction in pain to no more than 4/10 for improved ability to perform daily activities.  3.Increase left knee ROM per appropriate protocol.  4.Increase left knee strength to 3+/5.     Long Term Goals: 6 weeks   1.Independent with HEP.  2.Reduction in pain to no more than 2/10 for improved ability to perform daily activities.  3.Increase left knee ROM per appropriate protocol.  4.Increase left knee strength to 4+/5.  5.Ambulate independently with LRD.  6.FOTO score improved to <20%.    Plan     Plan of care Certification: 8/2/2024 to 11/2/2024.     Outpatient Physical Therapy 2 times weekly for 6 weeks to include the following interventions: Gait Training, Manual Therapy, Neuromuscular Re-ed, Patient Education, Therapeutic Activities, and Therapeutic Exercise.     Cristina Padron, PT

## 2024-09-23 ENCOUNTER — CLINICAL SUPPORT (OUTPATIENT)
Dept: REHABILITATION | Facility: HOSPITAL | Age: 49
End: 2024-09-23
Payer: COMMERCIAL

## 2024-09-23 DIAGNOSIS — Z74.09 IMPAIRED FUNCTIONAL MOBILITY, BALANCE, GAIT, AND ENDURANCE: Primary | ICD-10-CM

## 2024-09-23 PROCEDURE — 97530 THERAPEUTIC ACTIVITIES: CPT

## 2024-09-23 PROCEDURE — 97110 THERAPEUTIC EXERCISES: CPT

## 2024-09-23 NOTE — PROGRESS NOTES
[facility]   Physical Therapy Treatment Note      Name: Catalina Abrams  Clinic Number: [id number]    Therapy Diagnosis: Impaired functional mobility, balance, gait, and endurance  Physician: Rustam Garza NP    Visit Date: 9/23/2024    Physician Orders: PT Eval and Treat   Medical Diagnosis from Referral: Open reduction internal fixation left patella fracture  Evaluation Date: 8/2/2024  Authorization Period Expiration: 8/2/2025  Plan of Care Expiration: 11/2/2024  Progress Note Due: 9/14/24  Date of Surgery: 7/8/24  Visit # / Visits authorized: 12/12   FOTO: 1/ 3      Time In: 10:30  Time Out: 11:25  Total Billable Time: 55 minutes  PTA Visit #: 0/5     Precautions: Phase I Week 0-2  -Knee immobilizer: Worn at all times - taken off for only physical therapy sessions converted to hinged knee brace at first post-op visit.  -Weightbearing: Weight bear as tolerated with the knees locked in extension.  -ROM: AROM/AAROM/PROM 0-30 degrees  -Therapeutic exercises Isometric quadriceps/hamstring/adductor/abductor strengthening, ankle teraband exercises.     Phase II Week 2-6  -Knee brace with weightbearing activities in locked in full extension ok to remove at night.  -Weightbearing: ROM AROM/AAROM/PROM - add 15 degrees of flexion each week- goal is 90 degrees by week 6  -Therapeutic exercises Isometric quadriceps/hamstring/adductor/abductor strengthening, ankle theraband exercises, initiate straight leg raises.     Phase III 6-10 weeks  -Knee brace: unlocked - worn with weightbearing activities  -Weightbearing full  -ROM AROM/AAROM/PROM - progress to full ROM by week 10  -Therapeutic exercises Isometric quadriceps/hamstring/adductor/abductor strengthening, ankle teraband exercises, initiate straight leg raises.     Phase IV: 10-12 weeks  -Discontinue knee brace  -Full weightbearing  -Full ROM  -Therapeutic exercises Isometric quadriceps/hamstring/adductor/abductor strengthening, ankle  "teraband exercises, initiate straight leg raises, start stationary bike.     Phase V: 3-6 months  -Return to full activities as tolerated.      Subjective     Patient reports: she thinks she overdid it at work. She has some stiffness and swelling today as well as pain on the superiomedial aspect of the knee. She is ambulating without the brace or assistive device at this time.  She was compliant with home exercise program.  Response to previous treatment: good  Functional change: none    Pain: 2/10  Location: left knee      Objective      AROM right knee flexion 106 deg    Treatment     Trang received the treatments listed below:      therapeutic exercises to develop strength, endurance, ROM, and flexibility for 20 minutes including:  Sci-fit x 10 min full revolutions  Quad sets x 15  LAQ 3 x 10  Hamstring stretch long seat 3 x 30"             Bridges x 20      Hamstrings curls with Physio-ball x 20  Hook-lying clams red thera-band 5"H x 20  SLR's x 15  Hip adductor with ball x 15    therapeutic activities to improve functional performance for 25 minutes, including:  Right toe taps 6" step to focus on weight bearing and weight shifting onto left lower extremity, cuing for glute and quad activation on left lower extremity   Fwd step up with left lower extremity onto 6" step x 20  Lateral step up with left lower extremity onto 4" step x 15  Side steps 20' x 2 laps  Sit to stand from mat x 10      Patient Education and Home Exercises       Education provided:   - Quad sets, hamstring sets    Written Home Exercises Provided: Yes. Exercises were reviewed and Trang was able to demonstrate them prior to the end of the session.  Trang demonstrated good  understanding of the education provided. See Electronic Medical Record under Patient Instructions for exercises provided during therapy sessions    Assessment     Patient continues to ambulate with antalgic gait pattern and decreased heel strike/toe off. She continues with " decreased quad control, but stable enough to ambulate safely without brace. Patient's ROM is progressing steadily. Still with pain at end range flexion but tolerable. Patient with good tolerance to treatment this date with progressions noted.     Trang Is progressing well towards her goals.   Patient prognosis is Good.     Patient will continue to benefit from skilled outpatient physical therapy to address the deficits listed in the problem list box on initial evaluation, provide pt/family education and to maximize pt's level of independence in the home and community environment.     Patient's spiritual, cultural and educational needs considered and pt agreeable to plan of care and goals.     Anticipated barriers to physical therapy: 0    Goals: Short Term Goals: 3 weeks   1.Compliant with HEP.  2.Reduction in pain to no more than 4/10 for improved ability to perform daily activities.  3.Increase left knee ROM per appropriate protocol.  4.Increase left knee strength to 3+/5.     Long Term Goals: 6 weeks   1.Independent with HEP.  2.Reduction in pain to no more than 2/10 for improved ability to perform daily activities.  3.Increase left knee ROM per appropriate protocol.  4.Increase left knee strength to 4+/5.  5.Ambulate independently with LRD.  6.FOTO score improved to <20%.    Plan     Plan of care Certification: 8/2/2024 to 11/2/2024.     Outpatient Physical Therapy 2 times weekly for 6 weeks to include the following interventions: Gait Training, Manual Therapy, Neuromuscular Re-ed, Patient Education, Therapeutic Activities, and Therapeutic Exercise.     Lyly Carmona, PT

## 2024-10-02 ENCOUNTER — OFFICE VISIT (OUTPATIENT)
Dept: ORTHOPEDICS | Facility: CLINIC | Age: 49
End: 2024-10-02
Payer: COMMERCIAL

## 2024-10-02 ENCOUNTER — HOSPITAL ENCOUNTER (OUTPATIENT)
Dept: RADIOLOGY | Facility: HOSPITAL | Age: 49
Discharge: HOME | End: 2024-10-02
Attending: NURSE PRACTITIONER
Payer: COMMERCIAL

## 2024-10-02 VITALS — WEIGHT: 160.06 LBS | HEIGHT: 65 IN | BODY MASS INDEX: 26.67 KG/M2

## 2024-10-02 DIAGNOSIS — M81.6 LOCALIZED OSTEOPOROSIS OF LEQUESNE: ICD-10-CM

## 2024-10-02 DIAGNOSIS — M81.0 OSTEOPOROSIS, UNSPECIFIED OSTEOPOROSIS TYPE, UNSPECIFIED PATHOLOGICAL FRACTURE PRESENCE: ICD-10-CM

## 2024-10-02 DIAGNOSIS — M25.562 ACUTE PAIN OF LEFT KNEE: Primary | ICD-10-CM

## 2024-10-02 DIAGNOSIS — Z98.890 POST-OPERATIVE STATE: ICD-10-CM

## 2024-10-02 DIAGNOSIS — M25.562 ACUTE PAIN OF LEFT KNEE: ICD-10-CM

## 2024-10-02 DIAGNOSIS — M80.80XA PATHOLOGICAL FRACTURE DUE TO OSTEOPOROSIS, UNSPECIFIED FRACTURE SITE, UNSPECIFIED OSTEOPOROSIS TYPE, INITIAL ENCOUNTER: Primary | ICD-10-CM

## 2024-10-02 DIAGNOSIS — S82.042D CLOSED DISPLACED COMMINUTED FRACTURE OF LEFT PATELLA WITH ROUTINE HEALING, SUBSEQUENT ENCOUNTER: Primary | ICD-10-CM

## 2024-10-02 PROCEDURE — 1159F MED LIST DOCD IN RCRD: CPT | Mod: CPTII,S$GLB,, | Performed by: PHYSICIAN ASSISTANT

## 2024-10-02 PROCEDURE — 73560 X-RAY EXAM OF KNEE 1 OR 2: CPT | Mod: TC,LT

## 2024-10-02 PROCEDURE — 1160F RVW MEDS BY RX/DR IN RCRD: CPT | Mod: CPTII,S$GLB,, | Performed by: PHYSICIAN ASSISTANT

## 2024-10-02 PROCEDURE — 1160F RVW MEDS BY RX/DR IN RCRD: CPT | Mod: CPTII,S$GLB,, | Performed by: NURSE PRACTITIONER

## 2024-10-02 PROCEDURE — 73560 X-RAY EXAM OF KNEE 1 OR 2: CPT | Mod: 26,LT,, | Performed by: RADIOLOGY

## 2024-10-02 PROCEDURE — 99999 PR PBB SHADOW E&M-EST. PATIENT-LVL III: CPT | Mod: PBBFAC,,, | Performed by: NURSE PRACTITIONER

## 2024-10-02 PROCEDURE — 99024 POSTOP FOLLOW-UP VISIT: CPT | Mod: S$GLB,,, | Performed by: NURSE PRACTITIONER

## 2024-10-02 PROCEDURE — 3044F HG A1C LEVEL LT 7.0%: CPT | Mod: CPTII,S$GLB,, | Performed by: PHYSICIAN ASSISTANT

## 2024-10-02 PROCEDURE — 1159F MED LIST DOCD IN RCRD: CPT | Mod: CPTII,S$GLB,, | Performed by: NURSE PRACTITIONER

## 2024-10-02 PROCEDURE — 99214 OFFICE O/P EST MOD 30 MIN: CPT | Mod: 25,S$GLB,, | Performed by: PHYSICIAN ASSISTANT

## 2024-10-02 PROCEDURE — 99999 PR PBB SHADOW E&M-EST. PATIENT-LVL II: CPT | Mod: PBBFAC,,, | Performed by: PHYSICIAN ASSISTANT

## 2024-10-02 PROCEDURE — 3044F HG A1C LEVEL LT 7.0%: CPT | Mod: CPTII,S$GLB,, | Performed by: NURSE PRACTITIONER

## 2024-10-02 RX ORDER — IBUPROFEN 600 MG/1
600 TABLET ORAL EVERY 8 HOURS PRN
Qty: 30 TABLET | Refills: 1 | Status: SHIPPED | OUTPATIENT
Start: 2024-10-02

## 2024-10-02 NOTE — PROGRESS NOTES
Ms. Abrams is here today for a post-operative visit after undergoing the following:    Open reduction internal fixation left patella fracture      by Dr. Mullins on 7/8/2024.    Interval History:  She reports that she is doing ok.  She states their pain is tolerable.  She is using Tylenol on a leave daily.  No longer having muscle spasms.  She reports that therapy stopped 2 weeks ago but she would like to get more therapy.  She is no longer in her T scope brace but in the knee support sleeve.  She denies any falls or injuries since surgery/last seen in the clinic.  She denies fever, chills, and sweats since the time of the surgery.     Physical exam:  The patient's incision is open air and healed.  She has tactile stimulation to their lower leg, she denies calf pain, there is no leg edema and their pedal pulse is palpable x 2.  Muscle strength to the left lower leg is 4/5.  Knee Range of motion is 0° to 110°.    RADS:  Left knee x-ray was obtained, findings show patella fracture appears to be stable.  No other fractures noted.    Assessment:  Post-op visit (12 weeks)    Plan:    ICD-10-CM ICD-9-CM   1. Closed displaced comminuted fracture of left patella with routine healing, subsequent encounter  S82.042D V54.16   2. Post-operative state  Z98.890 V45.89       Current care, treatment plan, precautions, activity level/ modifications, limitations, rehabilitation exercises and proposed future treatment were discussed with the patient. We discussed the need to monitor for changes in symptoms and condition and report them to the physician.  Discussed importance of compliance with all appointments and follow up examinations.     PHYSICAL THERAPY:   - Physical therapy [facility] in [facility].  - Weight bearing as below  - Range of motion as below    Phase I Week 0-2 - done  -Knee immobilizer: Worn at all times - taken off for only physical therapy sessions converted to hinged  knee brace at first post-op visit.  -Weightbearing: Weight bear as tolerated with the knees locked in extension.  -ROM: AROM/AAROM/PROM 0-30 degrees  -Therapeutic exercises Isometric quadriceps/hamstring/adductor/abductor strengthening, ankle teraband exercises.    Phase II Week 2-6 - done  -Knee brace with weightbearing activities in locked in full extension ok to remove at night.  -Weightbearing: ROM AROM/AAROM/PROM - add 15 degrees of flexion each week- goal is 90 degrees by week 6  -Therapeutic exercises Isometric quadriceps/hamstring/adductor/abductor strengthening, ankle teraband exercises, initiate straight leg raises.    Phase III 6-10 weeks - done  -Knee brace: unlocked - worn with weightbearing activities  -Weightbearing full  -ROM AROM/AAROM/PROM - progress to full ROM by week 10  -Therapeutic exercises Isometric quadriceps/hamstring/adductor/abductor strengthening, ankle teraband exercises, initiate straight leg raises.    Phase IV: 10-12 weeks - wean from  -Discontinue knee brace  -Full weightbearing  -Full ROM  -Therapeutic exercises Isometric quadriceps/hamstring/adductor/abductor strengthening, ankle teraband exercises, initiate straight leg raises, start stationary bike.    Phase V: 3-6 months - start  -Return to full activities as tolerated.       PAIN MEDICATION:   - Pain medication: refill was needed, I will give her Motrin 600 mg tid PRN  - Pain medication refill policy provided to patient for review, yes.    - Patient was informed a multi-modal approach is used to treat their pain.      FOLLOW UP:   - Patient will follow up with local Orthopedist in [facility] in 3 months.    - Future Appointments:   No future appointments.      If there are any questions prior to scheduled follow up, the patient was instructed to contact the office

## 2024-10-02 NOTE — PROGRESS NOTES
Chief Complaint & History of Present Illness:  Catalina Abrams is a established patient 48 y.o. female who is seen here today for review of lab results, DEXA scan results, and determine a treatment plan for her osteoporosis.  she has reviewed the information provided at her initial visit.  After review of treatment options together we has elected to proceed with Prolia as her treatment option today for her osteoporosis and to reduce risk of future fractures.        Review of patient's allergies indicates:  No Known Allergies      Current Outpatient Medications   Medication Sig Dispense Refill    acetaminophen (TYLENOL) 500 MG tablet Take 2 tablets (1,000 mg total) by mouth every 6 (six) hours.      aspirin (ECOTRIN) 81 MG EC tablet Take 1 tablet (81 mg total) by mouth 2 (two) times a day. End date September 3 2024      baclofen (LIORESAL) 10 MG tablet Take 1 tablet (10 mg total) by mouth 3 (three) times daily. 90 tablet 11    omeprazole (PRILOSEC) 20 MG capsule Take 1 capsule (20 mg total) by mouth once daily. 30 capsule 0    oxyCODONE (ROXICODONE) 5 MG immediate release tablet Take 1 tablet (5 mg total) by mouth every 4 (four) hours as needed (pain scale 7-10). 40 tablet 0    senna-docusate 8.6-50 mg (PERICOLACE) 8.6-50 mg per tablet Take 1 tablet by mouth 2 (two) times daily. 30 tablet 0     No current facility-administered medications for this visit.       Past Medical History:   Diagnosis Date    Known health problems: none        Past Surgical History:   Procedure Laterality Date    APPENDECTOMY       SECTION      HYSTEROSCOPY WITH DILATION AND CURETTAGE OF UTERUS N/A 8/15/2018    Procedure: HYSTEROSCOPY, WITH DILATION AND CURETTAGE OF UTERUS;  Surgeon: Elder Salazar MD;  Location: [facility] OR;  Service: OB/GYN;  Laterality: N/A;    OPEN REDUCTION AND INTERNAL FIXATION (ORIF) OF FRACTURE OF PATELLA Left 2024    Procedure: ORIF, FRACTURE, PATELLA - LEFT;  Surgeon: Tres Mullins MD;   "Location: [facility] OR 2ND FLR;  Service: Orthopedics;  Laterality: Left;  C-arm clockside, Ancef    THERMAL ABLATION OF ENDOMETRIUM N/A 8/15/2018    Procedure: ABLATION, ENDOMETRIUM, THERMAL;  Surgeon: Elder Salazar MD;  Location: [facility] OR;  Service: OB/GYN;  Laterality: N/A;    TUBAL LIGATION  1998       Lab Results   Component Value Date     08/21/2024    K 4.0 08/21/2024     08/21/2024    CO2 27 08/21/2024    GLU 92 08/21/2024    BUN 13 08/21/2024    CREATININE 0.8 08/21/2024    CALCIUM 10.4 08/21/2024    PROT 7.5 08/21/2024    ALBUMIN 4.1 08/21/2024    BILITOT 0.2 08/21/2024    ALKPHOS 86 08/21/2024    AST 14 08/21/2024    ALT 14 08/21/2024    ANIONGAP 4 (L) 08/21/2024      Lab Results   Component Value Date    WBC 10.63 07/07/2024    RBC 4.37 07/07/2024    HGB 13.7 07/07/2024    HCT 43.0 07/07/2024    MCV 98 07/07/2024    MCH 31.4 (H) 07/07/2024    MCHC 31.9 (L) 07/07/2024    RDW 12.9 07/07/2024     07/07/2024    MPV 12.1 07/07/2024    GRAN 6.9 07/07/2024    GRAN 64.5 07/07/2024    LYMPH 2.8 07/07/2024    LYMPH 26.4 07/07/2024    MONO 0.8 07/07/2024    MONO 7.6 07/07/2024    EOS 0.1 07/07/2024    BASO 0.03 07/07/2024    EOSINOPHIL 0.9 07/07/2024    BASOPHIL 0.3 07/07/2024    DIFFMETHOD Automated 07/07/2024      Lab Results   Component Value Date    MG 2.3 07/06/2024      Lab Results   Component Value Date    PHOS 2.5 (L) 07/06/2024      Lab Results   Component Value Date    TPJYTZQR68AX 41 07/06/2024      Lab Results   Component Value Date    .7 (H) 08/21/2024      Lab Results   Component Value Date    TSH 2.313 08/21/2024      Lab Results   Component Value Date    FREET4 0.83 08/21/2024      Lab Results   Component Value Date    HGBA1C 5.0 07/06/2024    ESTIMATEDAVG 97 07/06/2024      No results found for: "FREETESTOSTE"     DEXA Scan was reviewed and demonstrates :       T-Score Hip: -2.4     T-Score Spine: -2.3      FRAX:      Major Fx.: 7.0%         Hip Fx.: 1.7%                   "                  Vital Signs (Most Recent)  There were no vitals filed for this visit.      Review of Systems:  ROS:  Constitutional: no fever or chills  Eyes: no visual changes  ENT: no nasal congestion or sore throat  Respiratory: no respiratory symptoms  Cardiovascular: no chest pain or palpitations  Gastrointestinal: no nausea or vomiting, tolerating diet  Genitourinary: no hematuria or dysuria, endometrial polyps, menorrhalgia  Integument/Breast: no rash or pruritis  Hematologic/Lymphatic: no easy bruising or lymphadenopathy, iron deficiency anemia  Musculoskeletal: no arthralgias or myalgias, closed left patellar fracture  Neurological: no seizures or tremors  Behavioral/Psych: no auditory or visual hallucinations  Endocrine: no heat or cold intolerance    Catalina Abrams was given instructions on administration of Prolia today.  she will be contacted by the speciality pharmacy when her medication is available, and will be scheduled to receive further detailed  instruction on  administration or when and where to receive her treatment.     she will continue with her calcium and vitamin D.  Fall prevention and personal safety have been reviewed.    We have discussed the need for core strengthening and balance exercises for fall prevention.  Discussed the risk of osteonecrosis of the jaw with bisphosphonates with incidence estimated from 1-10/481785 treated patients. Discussed that the incidence of ONJ is higher in patients undergoing invasive dental surgery (excludes routine cleaning, fillings or root canals). Dental work should ideally be completed prior to initiation of treatment; I told her to let her dentist know at the upcoming appointment that we are planning on treating with alendronate to prevent fractures. Preventative measures such as good oral hygiene encouraged.     Discussed the risk of atypical femur fractures with bisphosphonates and denosumab. The exact incidence is unknown, but has been estimated  at approximately 1 in 06820 patients treated with oral bisphosphonates and increasing incidence was correlated with increased duration of bisphosphonate use. Despite this risk, the significant benefit on fracture reduction far outweighs the potential for this rare event.         Assessment and plan:      ICD-10-CM ICD-9-CM   1. Pathological fracture due to osteoporosis, unspecified fracture site, unspecified osteoporosis type, initial encounter  M80.80XA 733.10     733.00   2. Localized osteoporosis of [place]  M81.6 733.09   3. Osteoporosis, unspecified osteoporosis type, unspecified pathological fracture presence  M81.0 733.00       Denosumab 60 mg Sub Q daily    Referral to Endocrine for primary hyperparathyroidism

## 2024-10-15 DIAGNOSIS — S82.002S: Primary | ICD-10-CM

## 2024-10-28 ENCOUNTER — TELEPHONE (OUTPATIENT)
Dept: ORTHOPEDICS | Facility: CLINIC | Age: 49
End: 2024-10-28
Payer: COMMERCIAL

## 2024-11-04 ENCOUNTER — TELEPHONE (OUTPATIENT)
Dept: ORTHOPEDICS | Facility: CLINIC | Age: 49
End: 2024-11-04
Payer: COMMERCIAL

## 2024-11-04 DIAGNOSIS — S89.92XD INJURY OF LEFT KNEE, SUBSEQUENT ENCOUNTER: ICD-10-CM

## 2024-11-04 DIAGNOSIS — Z87.81 S/P ORIF (OPEN REDUCTION INTERNAL FIXATION) FRACTURE: Primary | ICD-10-CM

## 2024-11-04 DIAGNOSIS — Z98.890 S/P ORIF (OPEN REDUCTION INTERNAL FIXATION) FRACTURE: Primary | ICD-10-CM

## 2024-11-04 NOTE — TELEPHONE ENCOUNTER
Pt called wanting to schedule ortho referral in system.   Pt had surgery previously, explained this will have to get approval from Dr. Lopez prior to me scheduling her. Advised he is out of office currently but once advised someone will contact her from the clinic. Pt verbalized understanding.     Secure chat sent to Dr. Lopez and JAY Lim as I will be out of clinic tomorrow.

## 2024-11-04 NOTE — TELEPHONE ENCOUNTER
per Dr. Lopez: I don't mind seeing. Patient needs to understand I wouldn't be prescribing any pain medications or placing on any restrictions or doing any disability eval etc. if anything like that is needed her surgeon that did case should do that.       Called pt to inform. No answer, LVM advising to return call to clinic for assistance scheduling

## 2024-11-04 NOTE — TELEPHONE ENCOUNTER
pt stated ANNAMARIA Real wanted her to check in on it, pt has mild swelling, 5-7/10 pain by the end of the day- works at an [employer]-walks alot.

## 2024-11-11 ENCOUNTER — OFFICE VISIT (OUTPATIENT)
Dept: ORTHOPEDICS | Facility: CLINIC | Age: 49
End: 2024-11-11
Payer: COMMERCIAL

## 2024-11-11 ENCOUNTER — HOSPITAL ENCOUNTER (OUTPATIENT)
Dept: RADIOLOGY | Facility: HOSPITAL | Age: 49
Discharge: HOME | End: 2024-11-11
Attending: ORTHOPAEDIC SURGERY
Payer: COMMERCIAL

## 2024-11-11 VITALS — WEIGHT: 179.88 LBS | BODY MASS INDEX: 29.97 KG/M2 | HEIGHT: 65 IN

## 2024-11-11 DIAGNOSIS — Z98.890 S/P ORIF (OPEN REDUCTION INTERNAL FIXATION) FRACTURE: ICD-10-CM

## 2024-11-11 DIAGNOSIS — Z87.81 S/P ORIF (OPEN REDUCTION INTERNAL FIXATION) FRACTURE: ICD-10-CM

## 2024-11-11 DIAGNOSIS — Z98.890 S/P ORIF (OPEN REDUCTION INTERNAL FIXATION) FRACTURE: Primary | ICD-10-CM

## 2024-11-11 DIAGNOSIS — Z87.81 S/P ORIF (OPEN REDUCTION INTERNAL FIXATION) FRACTURE: Primary | ICD-10-CM

## 2024-11-11 DIAGNOSIS — S89.92XD INJURY OF LEFT KNEE, SUBSEQUENT ENCOUNTER: ICD-10-CM

## 2024-11-11 PROCEDURE — 73562 X-RAY EXAM OF KNEE 3: CPT | Mod: 26,LT,, | Performed by: RADIOLOGY

## 2024-11-11 PROCEDURE — 99205 OFFICE O/P NEW HI 60 MIN: CPT | Mod: S$GLB,,, | Performed by: ORTHOPAEDIC SURGERY

## 2024-11-11 PROCEDURE — 1159F MED LIST DOCD IN RCRD: CPT | Mod: CPTII,S$GLB,, | Performed by: ORTHOPAEDIC SURGERY

## 2024-11-11 PROCEDURE — 1160F RVW MEDS BY RX/DR IN RCRD: CPT | Mod: CPTII,S$GLB,, | Performed by: ORTHOPAEDIC SURGERY

## 2024-11-11 PROCEDURE — 73562 X-RAY EXAM OF KNEE 3: CPT | Mod: TC,PN,LT

## 2024-11-11 PROCEDURE — 99999 PR PBB SHADOW E&M-EST. PATIENT-LVL III: CPT | Mod: PBBFAC,,, | Performed by: ORTHOPAEDIC SURGERY

## 2024-11-11 PROCEDURE — 3044F HG A1C LEVEL LT 7.0%: CPT | Mod: CPTII,S$GLB,, | Performed by: ORTHOPAEDIC SURGERY

## 2024-11-11 PROCEDURE — 3008F BODY MASS INDEX DOCD: CPT | Mod: CPTII,S$GLB,, | Performed by: ORTHOPAEDIC SURGERY

## 2024-11-11 NOTE — PROGRESS NOTES
Subjective:      Patient ID: Catalina Abrams is a 48 y.o. female.    Chief Complaint: Pain of the Left Knee    HPI  48-year-old female with a four-month history of left knee pain status post ORIF left patella fracture.  She was apparently been released from care by her surgeon from [place] in his elected to follow up here.  Main concern in his pain with prolonged standing walking intermittent swelling with activities takes Motrin with the some slight improvement was out of therapy for a while in his set to begin later this week  ROS      Objective:    Ortho Exam     Constitutional:   Patient is alert  and oriented in no acute distress  HEENT:  normocephalic atraumatic; PERRL EOMI  Neck:  Supple without adenopathy  Cardiovascular:  Normal rate and rhythm  Pulmonary:  Normal respiratory effort normal chest wall expansion  Abdominal:  Nonprotuberant nondistended  Musculoskeletal:  Well-healed surgical incision  Adequate range of motion  Poor general quad tone no significant effusion  No increased warmth or erythema  Neurological:  No focal defect; cranial nerves 2-12 grossly intact  Psychiatric/behavioral:  Mood and behavior normal      X-Ray Knee 1 or 2 View Left  Narrative: EXAMINATION:  XR KNEE 1 OR 2 VIEW LEFT    CLINICAL HISTORY:  Pain in left knee    TECHNIQUE:  One or two views of the left knee were performed.    COMPARISON:  N 08/21/2024 one    FINDINGS:  Healing fracture of the patella identified in a satisfactory and unchanged position as compared to the previous study  Impression: See above    Electronically signed by: Wan Key MD  Date:    10/02/2024  Time:    10:34       My Radiographs Findings:    I have personally reviewed radiographs and concur with above findings    Assessment:       Encounter Diagnosis   Name Primary?    S/P ORIF (open reduction internal fixation) fracture Yes         Plan:       I have discussed medical condition treatment options with her at length I agree with a return to  physical therapy she may advance activities as tolerated.  If her significant pain persists then I would insist on her follow up with the surgeon of record.  She states she has not seen her surgeon since the day of surgery.        Past Medical History:   Diagnosis Date    Known health problems: none      Past Surgical History:   Procedure Laterality Date    APPENDECTOMY       SECTION      HYSTEROSCOPY WITH DILATION AND CURETTAGE OF UTERUS N/A 8/15/2018    Procedure: HYSTEROSCOPY, WITH DILATION AND CURETTAGE OF UTERUS;  Surgeon: Elder Salazar MD;  Location: [facility] OR;  Service: OB/GYN;  Laterality: N/A;    OPEN REDUCTION AND INTERNAL FIXATION (ORIF) OF FRACTURE OF PATELLA Left 2024    Procedure: ORIF, FRACTURE, PATELLA - LEFT;  Surgeon: Tres Mullins MD;  Location: [address];  Service: Orthopedics;  Laterality: Left;  C-arm clockside, Ancef    THERMAL ABLATION OF ENDOMETRIUM N/A 8/15/2018    Procedure: ABLATION, ENDOMETRIUM, THERMAL;  Surgeon: Elder Salazar MD;  Location: [facility] OR;  Service: OB/GYN;  Laterality: N/A;    TUBAL LIGATION           Current Outpatient Medications:     acetaminophen (TYLENOL) 500 MG tablet, Take 2 tablets (1,000 mg total) by mouth every 6 (six) hours., Disp: , Rfl:     ibuprofen (ADVIL,MOTRIN) 600 MG tablet, Take 1 tablet (600 mg total) by mouth every 8 (eight) hours as needed for Pain., Disp: 30 tablet, Rfl: 1    Review of patient's allergies indicates:  No Known Allergies    Family History   Problem Relation Name Age of Onset    Hypertension Mother      COPD Mother      Diabetes Mother      Hypertension Father      Breast cancer Maternal Aunt      Breast cancer Maternal Cousin       Social History     Occupational History    Not on file   Tobacco Use    Smoking status: Every Day     Current packs/day: 0.25     Average packs/day: 0.3 packs/day for 30.0 years (7.5 ttl pk-yrs)     Types: Cigarettes    Smokeless tobacco: Never   Substance and Sexual  Activity    Alcohol use: No    Drug use: No    Sexual activity: Yes

## 2024-11-14 ENCOUNTER — CLINICAL SUPPORT (OUTPATIENT)
Dept: REHABILITATION | Facility: HOSPITAL | Age: 49
End: 2024-11-14
Payer: COMMERCIAL

## 2024-11-14 DIAGNOSIS — M25.562 CHRONIC PAIN OF LEFT KNEE: ICD-10-CM

## 2024-11-14 DIAGNOSIS — G89.29 CHRONIC PAIN OF LEFT KNEE: ICD-10-CM

## 2024-11-14 DIAGNOSIS — Z74.09 IMPAIRED FUNCTIONAL MOBILITY, BALANCE, GAIT, AND ENDURANCE: Primary | ICD-10-CM

## 2024-11-14 DIAGNOSIS — S82.002S: ICD-10-CM

## 2024-11-14 PROCEDURE — 97140 MANUAL THERAPY 1/> REGIONS: CPT

## 2024-11-14 PROCEDURE — 97161 PT EVAL LOW COMPLEX 20 MIN: CPT

## 2024-11-14 PROCEDURE — 97110 THERAPEUTIC EXERCISES: CPT

## 2024-11-14 NOTE — PROGRESS NOTES
[facility]  Physical Therapy Initial Evaluation / Plan Of Care    Name: Catalina Abrams  Clinic Number: [id number]    Therapy Diagnosis:   Encounter Diagnoses   Name Primary?    Traumatic closed nondisplaced fracture of patella, left, sequela     Impaired functional mobility, balance, gait, and endurance Yes    Chronic pain of left knee      Physician: Rustam Garza NP    Physician Orders: PT Eval and Treat  Medical Diagnosis: S82.042D (ICD-10-CM) - Closed displaced comminuted fracture of left patella with routine healing, subsequent encounter  Evaluation Date: 2024  Authorization period Expiration: 2024  Plan of Care Expiration: 2025  Progress Note Due: 2024  Visit #/Visits authorized:    FOTO: 1/ 3       Precautions: Standard, s/p ORIF left patella- patient is beyond post op precautions at this stage of recovery  Date of Surgery: 2024  Phase V: 3-6 months  -Return to full activities as tolerated      Time In: 8:00 am  Time Out: 8:53 am  Total Appointment Time: 53 minutes  Total Billable Time: 53 minutes  SUBJECTIVE       Past Medical History:   Diagnosis Date    Known health problems: none      Catalnia Abrams  has a past surgical history that includes Appendectomy; Tubal ligation ();  section; Hysteroscopy with dilation and curettage of uterus (N/A, 8/15/2018); Thermal ablation of endometrium (N/A, 8/15/2018); and Open reduction and internal fixation (ORIF) of fracture of patella (Left, 2024).    Catalina has a current medication list which includes the following prescription(s): acetaminophen and ibuprofen.    Review of patient's allergies indicates:  No Known Allergies     Imaging, XR KNEE 3 VIEW LEFT     CLINICAL HISTORY:  Other specified postprocedural states     TECHNIQUE:  AP, lateral, and Merchant views of the left knee were performed.     COMPARISON:  10/02/2024.     FINDINGS:  There is a healing subacute nondisplaced fracture  of the patella with increased sclerosis and callus formation.  Incomplete osseous fusion.  Persistent small suprapatellar effusion.        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  Prior Level of Function: independent  Current Level of Function: pain will all mobility, difficulty with transfers from low surfaces, moderate to severe difficulty with stairs and 1/2 kneeling (requirement of work)    Pain:  Current 4/10, worst 8/10, best 4/10   Location:  left knee cap and anterior knee joint  Description: Aching, Tight, and Sharp  Aggravating Factors: Standing, Bending, Walking, and Getting out of bed/chair  Easing Factors: ice, heating pad, rest, and elevation      Onset/ANTONIA: s/p ORIF left patella    History of current condition - Trang reports: patella fracture following a fall for which she had surgical repair on 2024. She underwent PT according to post op guidelines until patient's POC . She recently followed up with ortho and was advised to continue therapy. Patient reports continued difficulty with work related tasks due to pain and strength deficits. She states over the past month she had two incidents in which she was unable to lift herself when reaching for items on a lower shelf at work and says it was because she didn't have the strength in her legs rather than a locking in the knee. She reports right hip is starting to bother her, indicating iliotibial band.     Pts goals: to further improve mobility and work related tasks such as deep squats, 1/2 kneeling, lifting and carrying    OBJECTIVE     Edema: minimal edema noted surrounding left knee    Range of Motion:   Knee Left  "active Left Passive Right Active    0-126 0-128 (limited by pain) WNL       Lower Extremity Strength   Left Right   Knee extension: 5/5 5/5   Knee flexion: 4/5 4+/5   Hip flexion: 4-/5 4/5   Hip extension:  3+/5 4/5   Hip abduction: 4/5 5/5   Hip adduction: 3+/5 5/5   Hip IR 4/5 5/5   Hip ER 3+/5 5/5   Ankle dorsiflexion: 5/5 5/5   Ankle plantarflexion: 5/5 5/5       Special Tests:   Left Right   Valgus Stress Test Negative Negative   Varus Stress test Negative    Negative      Patellar Grind Test Positive Negative     Step down test: noted instability at the hip and knee    Function:  - SLS L: 30 sec firm surface with trendeleburg pattern noted  - Squat: able to perform 50-75% of squat with bilateral genu valgus, left more than right   - Sit <--> Stand: equal weight bearing bilaterally with mild genu valgus bilaterally     Joint Mobility: Patellar minimally restricted superior/inferior   Tibiofemoral unrestricted,     Palpation: minimal tenderness to palpation at distal patella    Sensation: intact to light touch    Flexibility:    90/90 SLR = R minimal restriction, L minimal restriction   Ely's test: R minimal restriction, L moderate restriction   Danny's test: R moderate restriction, L moderate restriction    PT Evaluation Completed: Yes  Discussed Plan of Care with patient: Yes    Treatment     Treatment Time In: 8:25  Treatment Time Out: 8:53  Total Treatment time separate from Evaluation: 28 minutes    Trang received therapeutic exercises to develop strength and ROM for 18 minutes including:  Quad set with increased focus on distal quad activation 5"H x 10  SLR x 10 with focus on minimizing extension lag  Side-lying hip abduction x 10 with verbal and tactile cuing for leg alignment- consider performing against the wall next session  Standing modified Y balance- left leg is stance leg x 5 (modified to lateral instead of posteromedial   Static standing from 6" step- left leg is stance leg 30"H x 2, focus on level " "pelvis for left glute med activation    Trang received the following manual therapy techniques: Joint mobilizations to left patella gr II-III superior/inferior glides and Soft tissue Mobilization to left distal quad and iliotibial band for 10  minutes.     Possible Next visit:   NMES to quad with: Quad set, SAQ, SLR flexion, and Ball squeeze 3 min ea, 10"H  Minisquat with ball at wall, green thera-band at knees  Lateral step down 2"-4" step  Future progressions:  Bosu squats  Bosu lateral lunge  Resisted monster walks boxed    Patient Education and Home Exercises     Home Exercises and Patient Education Provided    Education provided re:   - progress towards goals   - role of therapy in multi - disciplinary team, goals for therapy  Pt educated on condition, POC, and expectations in therapy.  No spiritual or educational barriers to learning provided    Home exercises:  Pt will be provided HEP during course of treatment with progressions as appropriate. Pt was advised to perform these exercises free of pain, and to stop performing them if pain occurs.   Trang demonstrated good  understanding of the education provided.     Functional Limitations Reports  Tool: FOTO  Intake: 48  Predicted: 69    ASSESSMENT      Catalina is a 48 y.o. female referred to outpatient physical therapy with a medical diagnosis of S82.042D (ICD-10-CM) - Closed displaced comminuted fracture of left patella with routine healing, subsequent encounter, and presents to PT with continued stiffness in left knee and decreased left patella mobility affecting range of motion. Patient with poor distal quad activation and may benefit from NMES to improve this. Patient with no post op precautions at this stage of recovery and would benefit from higher level and task specific strengthening to maximize function. Patient demonstrates limitations as described in the problem list. Pt will benefit from physcial therapy services in order to maximize pain free " and/or functional use of left lower extremity. The following goals were discussed with the patient and patient is in agreement with them as to be addressed in the treatment plan.     Pt prognosis is Good.     Pt's spiritual, cultural and educational needs considered and pt agreeable to plan of care and goals as stated below:     Anticipated Barriers for therapy: pain    Plan of care discussed with patient: Yes    Pt will benefit from skilled outpatient Physical Therapy to address the deficits stated above and in the chart below, provide pt/family education, and to maximize pt's level of independence.     Medical necessity is demonstrated by the following IMPAIRMENTS/PROBLEM LIST:    weakness, impaired functional mobility, impaired balance, decreased lower extremity function, pain, abnormal tone, decreased ROM, impaired joint extensibility, impaired muscle length, orthopedic precautions, and decreased proprioception      Medical Necessity is demonstrated by the following  History  Co-morbidities and personal factors that may impact the plan of care Co-morbidities/Personal Factors    0= low, 1-2 = mod, 3+ = high   low   Examination  Body Structures and Functions, activity limitations and participation restrictions that may impact the plan of care Body Regions/Body Systems    Participation Restrictions    Activity limitations  Mobility/Self care/Domestic Life/Community and Social Life    1-2 = low, 3+ = mod, 4+ = high       low   Clinical Presentation stable and uncomplicated  Stable/uncomplicated = low, evolving/changing = mod,  Unstable/unpredictable = high low   Decision Making/ Complexity Score: low           GOALS     Short Term Goals: 3 weeks  Demonstrate improvement in recent symptoms to progress toward long term goals  Correct postural deviations in sitting and standing to decrease pain and promote postural awareness for injury prevention.  Restore AROM left knee to 0-130 deg or better  Demonstrate compliance  with initial exercise program    Long Term Goals: 6 weeks  Perform usual household tasks with no limitation  Perform usual work duties with minimal limitation  Ambulate required community distances with no limitation  Ascend/descend flight of stairs with no limitation, reciprocal pattern, handrail for safety  Transfer from floor to stand with no limitation, no use of upper extremities to assist  Perform 1/2 kneeling on Airex pad for cushion to left knee and rise to stand without upper extremity assist  Lift 20 lbs with both arms and carry 25 feet with no limitation  Demonstrate independence with home exercise program to maintain gains made in therapy.      PLAN      Certification Period: 11/14/2024 to 1/31/2025.    Outpatient Physical Therapy 2 times weekly for 12  visits  to include the following interventions: patient education, Electrical Stimulation NMES to distal quad, Gait Training, Manual Therapy, Moist Heat/ Ice, Neuromuscular Re-ed, Patient Education, Self Care, Therapeutic Activities, and Therapeutic Exercise.   Pt may be seen by PTA as part of the rehabilitation team.     I certify the need for these services furnished under this plan of treatment and while under my care.    Cristina Padron, PT        Attestation:   I have seen the patient, reviewed the therapist's plan of care, and I agree with the plan of care.   I certify the need for these services furnished under this plan of treatment and while under my care.         _______________            ________                                               _____________________  Physician/Referring Practitioner                                                            Date of Signature

## 2024-11-14 NOTE — PLAN OF CARE
[facility]  Physical Therapy Initial Evaluation / Plan Of Care    Name: Catalina Abrams  Clinic Number: [id number]    Therapy Diagnosis:   Encounter Diagnoses   Name Primary?    Traumatic closed nondisplaced fracture of patella, left, sequela     Impaired functional mobility, balance, gait, and endurance Yes    Chronic pain of left knee      Physician: Rustam Garza NP    Physician Orders: PT Eval and Treat  Medical Diagnosis: S82.042D (ICD-10-CM) - Closed displaced comminuted fracture of left patella with routine healing, subsequent encounter  Evaluation Date: 2024  Authorization period Expiration: 2024  Plan of Care Expiration: 2025  Progress Note Due: 2024  Visit #/Visits authorized:    FOTO: 1/ 3       Precautions: Standard, s/p ORIF left patella- patient is beyond post op precautions at this stage of recovery  Date of Surgery: 2024  Phase V: 3-6 months  -Return to full activities as tolerated      Time In: 8:00 am  Time Out: 8:53 am  Total Appointment Time: 53 minutes  Total Billable Time: 53 minutes  SUBJECTIVE       Past Medical History:   Diagnosis Date    Known health problems: none      Catalina Abrams  has a past surgical history that includes Appendectomy; Tubal ligation ();  section; Hysteroscopy with dilation and curettage of uterus (N/A, 8/15/2018); Thermal ablation of endometrium (N/A, 8/15/2018); and Open reduction and internal fixation (ORIF) of fracture of patella (Left, 2024).    Catalina has a current medication list which includes the following prescription(s): acetaminophen and ibuprofen.    Review of patient's allergies indicates:  No Known Allergies     Imaging, XR KNEE 3 VIEW LEFT     CLINICAL HISTORY:  Other specified postprocedural states     TECHNIQUE:  AP, lateral, and Merchant views of the left knee were performed.     COMPARISON:  10/02/2024.     FINDINGS:  There is a healing subacute nondisplaced fracture  of the patella with increased sclerosis and callus formation.  Incomplete osseous fusion.  Persistent small suprapatellar effusion.        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  Prior Level of Function: independent  Current Level of Function: pain will all mobility, difficulty with transfers from low surfaces, moderate to severe difficulty with stairs and 1/2 kneeling (requirement of work)    Pain:  Current 4/10, worst 8/10, best 4/10   Location:  left knee cap and anterior knee joint  Description: Aching, Tight, and Sharp  Aggravating Factors: Standing, Bending, Walking, and Getting out of bed/chair  Easing Factors: ice, heating pad, rest, and elevation      Onset/ANTONIA: s/p ORIF left patella    History of current condition - Trang reports: patella fracture following a fall for which she had surgical repair on 2024. She underwent PT according to post op guidelines until patient's POC . She recently followed up with ortho and was advised to continue therapy. Patient reports continued difficulty with work related tasks due to pain and strength deficits. She states over the past month she had two incidents in which she was unable to lift herself when reaching for items on a lower shelf at work and says it was because she didn't have the strength in her legs rather than a locking in the knee. She reports right hip is starting to bother her, indicating iliotibial band.     Pts goals: to further improve mobility and work related tasks such as deep squats, 1/2 kneeling, lifting and carrying    OBJECTIVE     Edema: minimal edema noted surrounding left knee    Range of Motion:   Knee Left  "active Left Passive Right Active    0-126 0-128 (limited by pain) WNL       Lower Extremity Strength   Left Right   Knee extension: 5/5 5/5   Knee flexion: 4/5 4+/5   Hip flexion: 4-/5 4/5   Hip extension:  3+/5 4/5   Hip abduction: 4/5 5/5   Hip adduction: 3+/5 5/5   Hip IR 4/5 5/5   Hip ER 3+/5 5/5   Ankle dorsiflexion: 5/5 5/5   Ankle plantarflexion: 5/5 5/5       Special Tests:   Left Right   Valgus Stress Test Negative Negative   Varus Stress test Negative    Negative      Patellar Grind Test Positive Negative     Step down test: noted instability at the hip and knee    Function:  - SLS L: 30 sec firm surface with trendeleburg pattern noted  - Squat: able to perform 50-75% of squat with bilateral genu valgus, left more than right   - Sit <--> Stand: equal weight bearing bilaterally with mild genu valgus bilaterally     Joint Mobility: Patellar minimally restricted superior/inferior   Tibiofemoral unrestricted,     Palpation: minimal tenderness to palpation at distal patella    Sensation: intact to light touch    Flexibility:    90/90 SLR = R minimal restriction, L minimal restriction   Ely's test: R minimal restriction, L moderate restriction   Danny's test: R moderate restriction, L moderate restriction    PT Evaluation Completed: Yes  Discussed Plan of Care with patient: Yes    Treatment     Treatment Time In: 8:25  Treatment Time Out: 8:53  Total Treatment time separate from Evaluation: 28 minutes    Trang received therapeutic exercises to develop strength and ROM for 18 minutes including:  Quad set with increased focus on distal quad activation 5"H x 10  SLR x 10 with focus on minimizing extension lag  Side-lying hip abduction x 10 with verbal and tactile cuing for leg alignment- consider performing against the wall next session  Standing modified Y balance- left leg is stance leg x 5 (modified to lateral instead of posteromedial   Static standing from 6" step- left leg is stance leg 30"H x 2, focus on level " "pelvis for left glute med activation    Trang received the following manual therapy techniques: Joint mobilizations to left patella gr II-III superior/inferior glides and Soft tissue Mobilization to left distal quad and iliotibial band for 10  minutes.     Possible Next visit:   NMES to quad with: Quad set, SAQ, SLR flexion, and Ball squeeze 3 min ea, 10"H  Minisquat with ball at wall, green thera-band at knees  Lateral step down 2"-4" step  Future progressions:  Bosu squats  Bosu lateral lunge  Resisted monster walks boxed    Patient Education and Home Exercises     Home Exercises and Patient Education Provided    Education provided re:   - progress towards goals   - role of therapy in multi - disciplinary team, goals for therapy  Pt educated on condition, POC, and expectations in therapy.  No spiritual or educational barriers to learning provided    Home exercises:  Pt will be provided HEP during course of treatment with progressions as appropriate. Pt was advised to perform these exercises free of pain, and to stop performing them if pain occurs.   Trang demonstrated good  understanding of the education provided.     Functional Limitations Reports  Tool: FOTO  Intake: 48  Predicted: 69    ASSESSMENT      Catalina is a 48 y.o. female referred to outpatient physical therapy with a medical diagnosis of S82.042D (ICD-10-CM) - Closed displaced comminuted fracture of left patella with routine healing, subsequent encounter, and presents to PT with continued stiffness in left knee and decreased left patella mobility affecting range of motion. Patient with poor distal quad activation and may benefit from NMES to improve this. Patient with no post op precautions at this stage of recovery and would benefit from higher level and task specific strengthening to maximize function. Patient demonstrates limitations as described in the problem list. Pt will benefit from physcial therapy services in order to maximize pain free " and/or functional use of left lower extremity. The following goals were discussed with the patient and patient is in agreement with them as to be addressed in the treatment plan.     Pt prognosis is Good.     Pt's spiritual, cultural and educational needs considered and pt agreeable to plan of care and goals as stated below:     Anticipated Barriers for therapy: pain    Plan of care discussed with patient: Yes    Pt will benefit from skilled outpatient Physical Therapy to address the deficits stated above and in the chart below, provide pt/family education, and to maximize pt's level of independence.     Medical necessity is demonstrated by the following IMPAIRMENTS/PROBLEM LIST:    weakness, impaired functional mobility, impaired balance, decreased lower extremity function, pain, abnormal tone, decreased ROM, impaired joint extensibility, impaired muscle length, orthopedic precautions, and decreased proprioception      Medical Necessity is demonstrated by the following  History  Co-morbidities and personal factors that may impact the plan of care Co-morbidities/Personal Factors    0= low, 1-2 = mod, 3+ = high   low   Examination  Body Structures and Functions, activity limitations and participation restrictions that may impact the plan of care Body Regions/Body Systems    Participation Restrictions    Activity limitations  Mobility/Self care/Domestic Life/Community and Social Life    1-2 = low, 3+ = mod, 4+ = high       low   Clinical Presentation stable and uncomplicated  Stable/uncomplicated = low, evolving/changing = mod,  Unstable/unpredictable = high low   Decision Making/ Complexity Score: low           GOALS     Short Term Goals: 3 weeks  Demonstrate improvement in recent symptoms to progress toward long term goals  Correct postural deviations in sitting and standing to decrease pain and promote postural awareness for injury prevention.  Restore AROM left knee to 0-130 deg or better  Demonstrate compliance  with initial exercise program    Long Term Goals: 6 weeks  Perform usual household tasks with no limitation  Perform usual work duties with minimal limitation  Ambulate required community distances with no limitation  Ascend/descend flight of stairs with no limitation, reciprocal pattern, handrail for safety  Transfer from floor to stand with no limitation, no use of upper extremities to assist  Perform 1/2 kneeling on Airex pad for cushion to left knee and rise to stand without upper extremity assist  Lift 20 lbs with both arms and carry 25 feet with no limitation  Demonstrate independence with home exercise program to maintain gains made in therapy.      PLAN      Certification Period: 11/14/2024 to 1/31/2025.    Outpatient Physical Therapy 2 times weekly for 12 visits to include the following interventions: patient education, Electrical Stimulation NMES to distal quad, Gait Training, Manual Therapy, Moist Heat/ Ice, Neuromuscular Re-ed, Patient Education, Self Care, Therapeutic Activities, and Therapeutic Exercise.   Pt may be seen by PTA as part of the rehabilitation team.     I certify the need for these services furnished under this plan of treatment and while under my care.    Cristina Padron, PT        Attestation:   I have seen the patient, reviewed the therapist's plan of care, and I agree with the plan of care.   I certify the need for these services furnished under this plan of treatment and while under my care.         _______________            ________                                               _____________________  Physician/Referring Practitioner                                                            Date of Signature

## 2024-11-25 ENCOUNTER — CLINICAL SUPPORT (OUTPATIENT)
Dept: REHABILITATION | Facility: HOSPITAL | Age: 49
End: 2024-11-25
Payer: COMMERCIAL

## 2024-11-25 DIAGNOSIS — Z74.09 IMPAIRED FUNCTIONAL MOBILITY, BALANCE, GAIT, AND ENDURANCE: Primary | ICD-10-CM

## 2024-11-25 DIAGNOSIS — G89.29 CHRONIC PAIN OF LEFT KNEE: ICD-10-CM

## 2024-11-25 DIAGNOSIS — M25.562 CHRONIC PAIN OF LEFT KNEE: ICD-10-CM

## 2024-11-25 PROCEDURE — 97110 THERAPEUTIC EXERCISES: CPT

## 2024-11-25 NOTE — PROGRESS NOTES
"[facility]   Physical Therapy Treatment Note      Name: Catalina Abrams  Clinic Number: [id number]    Therapy Diagnosis: Impaired functional mobility, balance, gait, and endurance  Physician: Vicky Antonio NP    Visit Date: 11/25/2024    Physician Orders: PT Eval and Treat  Medical Diagnosis: S82.042D (ICD-10-CM) - Closed displaced comminuted fracture of left patella with routine healing, subsequent encounter  Evaluation Date: 11/14/2024  Authorization period Expiration: 12/31/2024  Plan of Care Expiration: 2/14/2025  Progress Note Due: 12/27/2024  Visit #/Visits authorized: 2/12   FOTO: 1/ 3         Precautions: Standard, s/p ORIF left patella- patient is beyond post op precautions at this stage of recovery  Date of Surgery: 7/8/2024  Phase V: 3-6 months  -Return to full activities as tolerated        Time In: 7:35  Time Out: 8:20  Total Billable Time: 45 minutes  PTA Visit #: 0/5       Subjective     Patient reports: her left knee pain has remained the same and feels stiff.  She was compliant with home exercise program.  Response to previous treatment: good  Functional change: none    Pain: 4/10  Location: left knee cap and anterior knee joint      Objective      Objective Measures updated at progress report unless specified.     Treatment     Trang received the treatments listed below:      therapeutic exercises to develop strength and ROM for 45 minutes including:  Prone knee flexion with strap 10 x 30"  Quad set with increased focus on distal quad activation 5"H x 10  SLR 2.5# wt x 20 with focus on minimizing extension lag  SAQ's 3# wt x 20  Hip abd 2" wt x 20  Lunges x 20  Side-lying hip abduction x 10 with verbal and tactile cuing for leg alignment- consider performing against the wall next session  Standing modified Y balance- left leg is stance leg x 5 (modified to lateral instead of posteromedial   Static standing from 6" step- left leg is stance leg 30"H x 2, focus on " level pelvis for left glute med activation    manual therapy techniques: Joint mobilizations, Myofacial release, and Soft tissue Mobilization were applied to the: left knee for 0 minutes, including:      neuromuscular re-education activities to improve: Balance, Coordination, and Posture for 0 minutes. The following activities were included:    therapeutic activities to improve functional performance for 0  minutes, including:      direct contact modalities after being cleared for contraindications:     supervised modalities after being cleared for contradictions:       Patient Education and Home Exercises       Education provided:   - quad sets    Written Home Exercises Provided: Yes. Exercises were reviewed and Trang was able to demonstrate them prior to the end of the session.  Trang demonstrated good  understanding of the education provided. See Electronic Medical Record under Patient Instructions for exercises provided during therapy sessions    Assessment     Catalina is a 48 y.o. female referred to outpatient physical therapy with a medical diagnosis of S82.042D (ICD-10-CM) - Closed displaced comminuted fracture of left patella with routine healing, subsequent encounter, and presents to PT with continued stiffness in left knee and decreased left patella mobility affecting range of motion. Patient with poor distal quad activation and may benefit from NMES to improve this. Patient with no post op precautions at this stage of recovery and would benefit from higher level and task specific strengthening to maximize function. Patient demonstrates limitations as described in the problem list. Pt will benefit from physcial therapy services in order to maximize pain free and/or functional use of left lower extremity. The following goals were discussed with the patient and patient is in agreement with them as to be addressed in the treatment plan.     Trang Is progressing well towards her goals.   Patient prognosis is  Good.     Patient will continue to benefit from skilled outpatient physical therapy to address the deficits listed in the problem list box on initial evaluation, provide pt/family education and to maximize pt's level of independence in the home and community environment.     Patient's spiritual, cultural and educational needs considered and pt agreeable to plan of care and goals.     Anticipated barriers to physical therapy: 0    Goals:    Short Term Goals: 3 weeks  Demonstrate improvement in recent symptoms to progress toward long term goals  Correct postural deviations in sitting and standing to decrease pain and promote postural awareness for injury prevention.  Restore AROM left knee to 0-130 deg or better  Demonstrate compliance with initial exercise program     Long Term Goals: 6 weeks  Perform usual household tasks with no limitation  Perform usual work duties with minimal limitation  Ambulate required community distances with no limitation  Ascend/descend flight of stairs with no limitation, reciprocal pattern, handrail for safety  Transfer from floor to stand with no limitation, no use of upper extremities to assist  Perform 1/2 kneeling on Airex pad for cushion to left knee and rise to stand without upper extremity assist  Lift 20 lbs with both arms and carry 25 feet with no limitation  Demonstrate independence with home exercise program to maintain gains made in therapy.    Plan     Certification Period: 11/14/2024 to 1/31/2025.     Outpatient Physical Therapy 2 times weekly for 12  visits  to include the following interventions: patient education, Electrical Stimulation NMES to distal quad, Gait Training, Manual Therapy, Moist Heat/ Ice, Neuromuscular Re-ed, Patient Education, Self Care, Therapeutic Activities, and Therapeutic Exercise.   Pt may be seen by PTA as part of the rehabilitation team.      I certify the need for these services furnished under this plan of treatment and while under my  care.    Lyly Carmona, PT

## 2024-11-27 ENCOUNTER — CLINICAL SUPPORT (OUTPATIENT)
Dept: REHABILITATION | Facility: HOSPITAL | Age: 49
End: 2024-11-27
Payer: COMMERCIAL

## 2024-11-27 DIAGNOSIS — G89.29 CHRONIC PAIN OF LEFT KNEE: ICD-10-CM

## 2024-11-27 DIAGNOSIS — Z74.09 IMPAIRED FUNCTIONAL MOBILITY, BALANCE, GAIT, AND ENDURANCE: Primary | ICD-10-CM

## 2024-11-27 DIAGNOSIS — M25.562 CHRONIC PAIN OF LEFT KNEE: ICD-10-CM

## 2024-11-27 PROCEDURE — 97110 THERAPEUTIC EXERCISES: CPT

## 2024-11-27 NOTE — PROGRESS NOTES
"[facility]   Physical Therapy Treatment Note      Name: Catalina Abrams  Clinic Number: [id number]    Therapy Diagnosis: Impaired functional mobility, balance, gait, and endurance  Physician: Vicky Antonio NP    Visit Date: 11/27/2024    Physician Orders: PT Eval and Treat  Medical Diagnosis: S82.042D (ICD-10-CM) - Closed displaced comminuted fracture of left patella with routine healing, subsequent encounter  Evaluation Date: 11/14/2024  Authorization period Expiration: 12/31/2024  Plan of Care Expiration: 2/14/2025  Progress Note Due: 12/27/2024  Visit #/Visits authorized: 3/12   FOTO: 1/ 3         Precautions: Standard, s/p ORIF left patella- patient is beyond post op precautions at this stage of recovery  Date of Surgery: 7/8/2024  Phase V: 3-6 months  -Return to full activities as tolerated        Time In: 7:30  Time Out: 8:25  Total Billable Time: 55 minutes  PTA Visit #: 0/5       Subjective     Patient reports: her left knee pain has remained the same and feels stiff.  She was compliant with home exercise program.  Response to previous treatment: good  Functional change: none    Pain: 2/10  Location: left knee cap and anterior knee joint      Objective      Objective Measures updated at progress report unless specified.     Treatment     Trang received the treatments listed below:      therapeutic exercises to develop strength and ROM for 55 minutes including:  Prone knee flexion with strap 10 x 30"  Quad set with increased focus on distal quad activation 5"H x 10  SLR 2.5# wt x 20 with focus on minimizing extension lag  SAQ's 3# wt x 20  Hip abd 2" wt x 20  Lunges x 20  Side-lying hip abduction x 10 with verbal and tactile cuing for leg alignment- consider performing against the wall next session  Standing modified Y balance- left leg is stance leg x 5 (modified to lateral instead of posteromedial   Static standing from 6" step- left leg is stance leg 30"H x 2, focus on " level pelvis for left glute med activation    manual therapy techniques: Joint mobilizations, Myofacial release, and Soft tissue Mobilization were applied to the: left knee for 0 minutes, including:      neuromuscular re-education activities to improve: Balance, Coordination, and Posture for 0 minutes. The following activities were included:    therapeutic activities to improve functional performance for 0  minutes, including:      direct contact modalities after being cleared for contraindications:     supervised modalities after being cleared for contradictions:       Patient Education and Home Exercises       Education provided:   - quad sets    Written Home Exercises Provided: Yes. Exercises were reviewed and Trang was able to demonstrate them prior to the end of the session.  Trang demonstrated good  understanding of the education provided. See Electronic Medical Record under Patient Instructions for exercises provided during therapy sessions    Assessment     Catalina is a 48 y.o. female referred to outpatient physical therapy with a medical diagnosis of S82.042D (ICD-10-CM) - Closed displaced comminuted fracture of left patella with routine healing, subsequent encounter, and presents to PT with continued stiffness in left knee and decreased left patella mobility affecting range of motion. Patient with poor distal quad activation and may benefit from NMES to improve this. Patient with no post op precautions at this stage of recovery and would benefit from higher level and task specific strengthening to maximize function. Patient demonstrates limitations as described in the problem list. Pt will benefit from physcial therapy services in order to maximize pain free and/or functional use of left lower extremity. The following goals were discussed with the patient and patient is in agreement with them as to be addressed in the treatment plan.     Trang Is progressing well towards her goals.   Patient prognosis is  Good.     Patient will continue to benefit from skilled outpatient physical therapy to address the deficits listed in the problem list box on initial evaluation, provide pt/family education and to maximize pt's level of independence in the home and community environment.     Patient's spiritual, cultural and educational needs considered and pt agreeable to plan of care and goals.     Anticipated barriers to physical therapy: 0    Goals:    Short Term Goals: 3 weeks  Demonstrate improvement in recent symptoms to progress toward long term goals  Correct postural deviations in sitting and standing to decrease pain and promote postural awareness for injury prevention.  Restore AROM left knee to 0-130 deg or better  Demonstrate compliance with initial exercise program     Long Term Goals: 6 weeks  Perform usual household tasks with no limitation  Perform usual work duties with minimal limitation  Ambulate required community distances with no limitation  Ascend/descend flight of stairs with no limitation, reciprocal pattern, handrail for safety  Transfer from floor to stand with no limitation, no use of upper extremities to assist  Perform 1/2 kneeling on Airex pad for cushion to left knee and rise to stand without upper extremity assist  Lift 20 lbs with both arms and carry 25 feet with no limitation  Demonstrate independence with home exercise program to maintain gains made in therapy.    Plan     Certification Period: 11/14/2024 to 1/31/2025.     Outpatient Physical Therapy 2 times weekly for 12  visits  to include the following interventions: patient education, Electrical Stimulation NMES to distal quad, Gait Training, Manual Therapy, Moist Heat/ Ice, Neuromuscular Re-ed, Patient Education, Self Care, Therapeutic Activities, and Therapeutic Exercise.   Pt may be seen by PTA as part of the rehabilitation team.      I certify the need for these services furnished under this plan of treatment and while under my  care.    Lyly Carmona, PT

## 2024-12-02 ENCOUNTER — CLINICAL SUPPORT (OUTPATIENT)
Dept: REHABILITATION | Facility: HOSPITAL | Age: 49
End: 2024-12-02
Payer: COMMERCIAL

## 2024-12-02 DIAGNOSIS — G89.29 CHRONIC PAIN OF LEFT KNEE: ICD-10-CM

## 2024-12-02 DIAGNOSIS — Z74.09 IMPAIRED FUNCTIONAL MOBILITY, BALANCE, GAIT, AND ENDURANCE: Primary | ICD-10-CM

## 2024-12-02 DIAGNOSIS — M25.562 CHRONIC PAIN OF LEFT KNEE: ICD-10-CM

## 2024-12-02 PROCEDURE — 97110 THERAPEUTIC EXERCISES: CPT

## 2024-12-02 NOTE — PROGRESS NOTES
"[facility]   Physical Therapy Treatment Note      Name: Catalina Abrams  Clinic Number: [id number]    Therapy Diagnosis: Impaired functional mobility, balance, gait, and endurance  Physician: Vicky Antonio NP    Visit Date: 12/2/2024    Physician Orders: PT Eval and Treat  Medical Diagnosis: S82.042D (ICD-10-CM) - Closed displaced comminuted fracture of left patella with routine healing, subsequent encounter  Evaluation Date: 11/14/2024  Authorization period Expiration: 12/31/2024  Plan of Care Expiration: 2/14/2025  Progress Note Due: 12/27/2024  Visit #/Visits authorized: 4/12   FOTO: 1/ 3         Precautions: Standard, s/p ORIF left patella- patient is beyond post op precautions at this stage of recovery  Date of Surgery: 7/8/2024  Phase V: 3-6 months  -Return to full activities as tolerated        Time In: 7:00  Time Out: 7:55  Total Billable Time: 55 minutes  PTA Visit #: 0/5       Subjective     Patient reports: her left knee pain has remained the same and feels stiff.  She was compliant with home exercise program.  Response to previous treatment: good  Functional change: none    Pain: 3/10  Location: left knee cap and anterior knee joint      Objective      Objective Measures updated at progress report unless specified.     Treatment     Trang received the treatments listed below:      therapeutic exercises to develop strength and ROM for 55 minutes including:  Prone knee flexion with strap 10 x 30"  Quad set with increased focus on distal quad activation 5"H x 10  SLR 2.5# wt x 20 with focus on minimizing extension lag  SAQ's 3# wt x 20  Hip abd 2" wt x 20  Lunges x 20  Side-lying hip abduction x 10 with verbal and tactile cuing for leg alignment- consider performing against the wall next session  Standing modified Y balance- left leg is stance leg x 5 (modified to lateral instead of posteromedial   Static standing from 6" step- left leg is stance leg 30"H x 2, focus on level " pelvis for left glute med activation    manual therapy techniques: Joint mobilizations, Myofacial release, and Soft tissue Mobilization were applied to the: left knee for 0 minutes, including:      neuromuscular re-education activities to improve: Balance, Coordination, and Posture for 0 minutes. The following activities were included:    therapeutic activities to improve functional performance for 0  minutes, including:      direct contact modalities after being cleared for contraindications:     supervised modalities after being cleared for contradictions:       Patient Education and Home Exercises       Education provided:   - quad sets    Written Home Exercises Provided: Yes. Exercises were reviewed and Trang was able to demonstrate them prior to the end of the session.  Trang demonstrated good  understanding of the education provided. See Electronic Medical Record under Patient Instructions for exercises provided during therapy sessions    Assessment     Catalina is a 48 y.o. female referred to outpatient physical therapy with a medical diagnosis of S82.042D (ICD-10-CM) - Closed displaced comminuted fracture of left patella with routine healing, subsequent encounter, and presents to PT with continued stiffness in left knee and decreased left patella mobility affecting range of motion. Patient with poor distal quad activation and may benefit from NMES to improve this. Patient with no post op precautions at this stage of recovery and would benefit from higher level and task specific strengthening to maximize function. Patient demonstrates limitations as described in the problem list. Pt will benefit from physcial therapy services in order to maximize pain free and/or functional use of left lower extremity. The following goals were discussed with the patient and patient is in agreement with them as to be addressed in the treatment plan.     Trang Is progressing well towards her goals.   Patient prognosis is Good.      Patient will continue to benefit from skilled outpatient physical therapy to address the deficits listed in the problem list box on initial evaluation, provide pt/family education and to maximize pt's level of independence in the home and community environment.     Patient's spiritual, cultural and educational needs considered and pt agreeable to plan of care and goals.     Anticipated barriers to physical therapy: 0    Goals:    Short Term Goals: 3 weeks  Demonstrate improvement in recent symptoms to progress toward long term goals  Correct postural deviations in sitting and standing to decrease pain and promote postural awareness for injury prevention.  Restore AROM left knee to 0-130 deg or better  Demonstrate compliance with initial exercise program     Long Term Goals: 6 weeks  Perform usual household tasks with no limitation  Perform usual work duties with minimal limitation  Ambulate required community distances with no limitation  Ascend/descend flight of stairs with no limitation, reciprocal pattern, handrail for safety  Transfer from floor to stand with no limitation, no use of upper extremities to assist  Perform 1/2 kneeling on Airex pad for cushion to left knee and rise to stand without upper extremity assist  Lift 20 lbs with both arms and carry 25 feet with no limitation  Demonstrate independence with home exercise program to maintain gains made in therapy.    Plan     Certification Period: 11/14/2024 to 1/31/2025.     Outpatient Physical Therapy 2 times weekly for 12  visits  to include the following interventions: patient education, Electrical Stimulation NMES to distal quad, Gait Training, Manual Therapy, Moist Heat/ Ice, Neuromuscular Re-ed, Patient Education, Self Care, Therapeutic Activities, and Therapeutic Exercise.   Pt may be seen by PTA as part of the rehabilitation team.      I certify the need for these services furnished under this plan of treatment and while under my  care.    Lyly Carmona, PT

## 2024-12-04 ENCOUNTER — CLINICAL SUPPORT (OUTPATIENT)
Dept: REHABILITATION | Facility: HOSPITAL | Age: 49
End: 2024-12-04
Payer: COMMERCIAL

## 2024-12-04 DIAGNOSIS — Z74.09 IMPAIRED FUNCTIONAL MOBILITY, BALANCE, GAIT, AND ENDURANCE: Primary | ICD-10-CM

## 2024-12-04 DIAGNOSIS — G89.29 CHRONIC PAIN OF LEFT KNEE: ICD-10-CM

## 2024-12-04 DIAGNOSIS — M25.562 CHRONIC PAIN OF LEFT KNEE: ICD-10-CM

## 2024-12-04 PROCEDURE — 97110 THERAPEUTIC EXERCISES: CPT

## 2024-12-04 NOTE — PROGRESS NOTES
"[facility]   Physical Therapy Treatment Note      Name: Catalina Abrams  Clinic Number: [id number]    Therapy Diagnosis: Impaired functional mobility, balance, gait, and endurance  Physician: Vicky Antonio NP    Visit Date: 12/4/2024    Physician Orders: PT Eval and Treat  Medical Diagnosis: S82.042D (ICD-10-CM) - Closed displaced comminuted fracture of left patella with routine healing, subsequent encounter  Evaluation Date: 11/14/2024  Authorization period Expiration: 12/31/2024  Plan of Care Expiration: 2/14/2025  Progress Note Due: 12/27/2024  Visit #/Visits authorized: 5/12   FOTO: 1/ 3         Precautions: Standard, s/p ORIF left patella- patient is beyond post op precautions at this stage of recovery  Date of Surgery: 7/8/2024  Phase V: 3-6 months  -Return to full activities as tolerated        Time In: 7:30  Time Out: 8:25  Total Billable Time: 55 minutes  PTA Visit #: 0/5       Subjective     Patient reports:her left knee feels stiff this morning.  She states she had shooting pain on her anterior left knee with a slight touch.  She was compliant with home exercise program.  Response to previous treatment: good  Functional change: none    Pain: 3/10  Location: left knee cap and anterior knee joint      Objective      Objective Measures updated at progress report unless specified.     Treatment     Trang received the treatments listed below:      therapeutic exercises to develop strength and ROM for 55 minutes including:  Prone knee flexion with strap 10 x 30"  Quad set with increased focus on distal quad activation 5"H x 10  SLR 2.5# wt x 20 with focus on minimizing extension lag  SAQ's 4# wt x 20  Hip abd 2" wt x 20  Lunges x 20  Mini-squats x 20  Side-lying hip abduction x 10 with verbal and tactile cuing for leg alignment- consider performing against the wall next session  Standing modified Y balance- left leg is stance leg x 5 (modified to lateral instead of posteromedial " "  Static standing from 6" step- left leg is stance leg 30"H x 2, focus on level pelvis for left glute med activation    manual therapy techniques: Joint mobilizations, Myofacial release, and Soft tissue Mobilization were applied to the: left knee for 0 minutes, including:      neuromuscular re-education activities to improve: Balance, Coordination, and Posture for 0 minutes. The following activities were included:    therapeutic activities to improve functional performance for 0  minutes, including:      direct contact modalities after being cleared for contraindications:     supervised modalities after being cleared for contradictions:       Patient Education and Home Exercises       Education provided:   - quad sets    Written Home Exercises Provided: Yes. Exercises were reviewed and Trang was able to demonstrate them prior to the end of the session.  Trang demonstrated good  understanding of the education provided. See Electronic Medical Record under Patient Instructions for exercises provided during therapy sessions    Assessment     Catalina is a 48 y.o. female referred to outpatient physical therapy with a medical diagnosis of S82.042D (ICD-10-CM) - Closed displaced comminuted fracture of left patella with routine healing, subsequent encounter, and presents to PT with continued stiffness in left knee and decreased left patella mobility affecting range of motion. Patient with poor distal quad activation and may benefit from NMES to improve this. Patient with no post op precautions at this stage of recovery and would benefit from higher level and task specific strengthening to maximize function. Patient demonstrates limitations as described in the problem list. Pt will benefit from physcial therapy services in order to maximize pain free and/or functional use of left lower extremity. The following goals were discussed with the patient and patient is in agreement with them as to be addressed in the treatment plan. "     Trang Is progressing well towards her goals.   Patient prognosis is Good.     Patient will continue to benefit from skilled outpatient physical therapy to address the deficits listed in the problem list box on initial evaluation, provide pt/family education and to maximize pt's level of independence in the home and community environment.     Patient's spiritual, cultural and educational needs considered and pt agreeable to plan of care and goals.     Anticipated barriers to physical therapy: 0    Goals:    Short Term Goals: 3 weeks  Demonstrate improvement in recent symptoms to progress toward long term goals  Correct postural deviations in sitting and standing to decrease pain and promote postural awareness for injury prevention.  Restore AROM left knee to 0-130 deg or better  Demonstrate compliance with initial exercise program     Long Term Goals: 6 weeks  Perform usual household tasks with no limitation  Perform usual work duties with minimal limitation  Ambulate required community distances with no limitation  Ascend/descend flight of stairs with no limitation, reciprocal pattern, handrail for safety  Transfer from floor to stand with no limitation, no use of upper extremities to assist  Perform 1/2 kneeling on Airex pad for cushion to left knee and rise to stand without upper extremity assist  Lift 20 lbs with both arms and carry 25 feet with no limitation  Demonstrate independence with home exercise program to maintain gains made in therapy.    Plan     Certification Period: 11/14/2024 to 1/31/2025.     Outpatient Physical Therapy 2 times weekly for 12  visits  to include the following interventions: patient education, Electrical Stimulation NMES to distal quad, Gait Training, Manual Therapy, Moist Heat/ Ice, Neuromuscular Re-ed, Patient Education, Self Care, Therapeutic Activities, and Therapeutic Exercise.   Pt may be seen by PTA as part of the rehabilitation team.      I certify the need for these  services furnished under this plan of treatment and while under my care.    Lyly Carmona, PT

## 2024-12-11 ENCOUNTER — CLINICAL SUPPORT (OUTPATIENT)
Dept: REHABILITATION | Facility: HOSPITAL | Age: 49
End: 2024-12-11
Payer: COMMERCIAL

## 2024-12-11 DIAGNOSIS — M25.562 CHRONIC PAIN OF LEFT KNEE: ICD-10-CM

## 2024-12-11 DIAGNOSIS — G89.29 CHRONIC PAIN OF LEFT KNEE: ICD-10-CM

## 2024-12-11 DIAGNOSIS — Z74.09 IMPAIRED FUNCTIONAL MOBILITY, BALANCE, GAIT, AND ENDURANCE: Primary | ICD-10-CM

## 2024-12-11 PROCEDURE — 97110 THERAPEUTIC EXERCISES: CPT

## 2024-12-11 NOTE — PROGRESS NOTES
"[facility]   Physical Therapy Treatment Note      Name: Catalina Abrams  Clinic Number: [id number]    Therapy Diagnosis: Impaired functional mobility, balance, gait, and endurance  Physician: Vicky Antonio NP    Visit Date: 12/11/2024    Physician Orders: PT Eval and Treat  Medical Diagnosis: S82.042D (ICD-10-CM) - Closed displaced comminuted fracture of left patella with routine healing, subsequent encounter  Evaluation Date: 11/14/2024  Authorization period Expiration: 12/31/2024  Plan of Care Expiration: 2/14/2025  Progress Note Due: 12/27/2024  Visit #/Visits authorized: 6/12   FOTO: 1/ 3         Precautions: Standard, s/p ORIF left patella- patient is beyond post op precautions at this stage of recovery  Date of Surgery: 7/8/2024  Phase V: 3-6 months  -Return to full activities as tolerated        Time In: 7:00  Time Out: 7:55  Total Billable Time: 55 minutes  PTA Visit #: 0/5       Subjective     Patient reports:her left knee feels stiff this morning.  She states she had shooting pain on her anterior left knee with a slight touch.  She was compliant with home exercise program.  Response to previous treatment: good  Functional change: none    Pain: 5/10  Location: left knee cap and anterior knee joint      Objective      Objective Measures updated at progress report unless specified.     Treatment     Trang received the treatments listed below:      therapeutic exercises to develop strength and ROM for 55 minutes including:  Prone knee flexion with strap 10 x 30"  Quad set with increased focus on distal quad activation 5"H x 10  SLR 2.5# wt x 20 with focus on minimizing extension lag  SAQ's 4# wt x 20  Hip abd 2" wt x 20  Lunges x 20  Mini-squats x 20  Side-lying hip abduction x 10 with verbal and tactile cuing for leg alignment- consider performing against the wall next session  Standing modified Y balance- left leg is stance leg x 5 (modified to lateral instead of " "posteromedial   Static standing from 6" step- left leg is stance leg 30"H x 2, focus on level pelvis for left glute med activation    manual therapy techniques: Joint mobilizations, Myofacial release, and Soft tissue Mobilization were applied to the: left knee for 0 minutes, including:      neuromuscular re-education activities to improve: Balance, Coordination, and Posture for 0 minutes. The following activities were included:    therapeutic activities to improve functional performance for 0  minutes, including:      direct contact modalities after being cleared for contraindications:     supervised modalities after being cleared for contradictions:       Patient Education and Home Exercises       Education provided:   - quad sets    Written Home Exercises Provided: Yes. Exercises were reviewed and Trang was able to demonstrate them prior to the end of the session.  Trang demonstrated good  understanding of the education provided. See Electronic Medical Record under Patient Instructions for exercises provided during therapy sessions    Assessment     Catalina is a 48 y.o. female referred to outpatient physical therapy with a medical diagnosis of S82.042D (ICD-10-CM) - Closed displaced comminuted fracture of left patella with routine healing, subsequent encounter, and presents to PT with continued stiffness in left knee and decreased left patella mobility affecting range of motion. Patient with poor distal quad activation and may benefit from NMES to improve this. Patient with no post op precautions at this stage of recovery and would benefit from higher level and task specific strengthening to maximize function. Patient demonstrates limitations as described in the problem list. Pt will benefit from physcial therapy services in order to maximize pain free and/or functional use of left lower extremity. The following goals were discussed with the patient and patient is in agreement with them as to be addressed in the " treatment plan.     Trang Is progressing well towards her goals.   Patient prognosis is Good.     Patient will continue to benefit from skilled outpatient physical therapy to address the deficits listed in the problem list box on initial evaluation, provide pt/family education and to maximize pt's level of independence in the home and community environment.     Patient's spiritual, cultural and educational needs considered and pt agreeable to plan of care and goals.     Anticipated barriers to physical therapy: 0    Goals:    Short Term Goals: 3 weeks  Demonstrate improvement in recent symptoms to progress toward long term goals  Correct postural deviations in sitting and standing to decrease pain and promote postural awareness for injury prevention.  Restore AROM left knee to 0-130 deg or better  Demonstrate compliance with initial exercise program     Long Term Goals: 6 weeks  Perform usual household tasks with no limitation  Perform usual work duties with minimal limitation  Ambulate required community distances with no limitation  Ascend/descend flight of stairs with no limitation, reciprocal pattern, handrail for safety  Transfer from floor to stand with no limitation, no use of upper extremities to assist  Perform 1/2 kneeling on Airex pad for cushion to left knee and rise to stand without upper extremity assist  Lift 20 lbs with both arms and carry 25 feet with no limitation  Demonstrate independence with home exercise program to maintain gains made in therapy.    Plan     Certification Period: 11/14/2024 to 1/31/2025.     Outpatient Physical Therapy 2 times weekly for 12  visits  to include the following interventions: patient education, Electrical Stimulation NMES to distal quad, Gait Training, Manual Therapy, Moist Heat/ Ice, Neuromuscular Re-ed, Patient Education, Self Care, Therapeutic Activities, and Therapeutic Exercise.   Pt may be seen by PTA as part of the rehabilitation team.      I certify the  need for these services furnished under this plan of treatment and while under my care.    Lyly Carmona, PT

## 2024-12-16 ENCOUNTER — CLINICAL SUPPORT (OUTPATIENT)
Dept: REHABILITATION | Facility: HOSPITAL | Age: 49
End: 2024-12-16
Payer: COMMERCIAL

## 2024-12-16 DIAGNOSIS — G89.29 CHRONIC PAIN OF LEFT KNEE: ICD-10-CM

## 2024-12-16 DIAGNOSIS — M25.562 CHRONIC PAIN OF LEFT KNEE: ICD-10-CM

## 2024-12-16 DIAGNOSIS — Z74.09 IMPAIRED FUNCTIONAL MOBILITY, BALANCE, GAIT, AND ENDURANCE: Primary | ICD-10-CM

## 2024-12-16 PROCEDURE — 97110 THERAPEUTIC EXERCISES: CPT

## 2024-12-16 NOTE — PROGRESS NOTES
"[facility]   Physical Therapy Treatment Note      Name: Catalina Abrams  Clinic Number: [id number]    Therapy Diagnosis: Impaired functional mobility, balance, gait, and endurance  Physician: Vicky Antonio NP    Visit Date: 12/16/2024    Physician Orders: PT Eval and Treat  Medical Diagnosis: S82.042D (ICD-10-CM) - Closed displaced comminuted fracture of left patella with routine healing, subsequent encounter  Evaluation Date: 11/14/2024  Authorization period Expiration: 12/31/2024  Plan of Care Expiration: 2/14/2025  Progress Note Due: 12/27/2024  Visit #/Visits authorized: 7/12   FOTO: 1/ 3         Precautions: Standard, s/p ORIF left patella- patient is beyond post op precautions at this stage of recovery  Date of Surgery: 7/8/2024  Phase V: 3-6 months  -Return to full activities as tolerated        Time In: 7:00  Time Out: 7:55  Total Billable Time: 55 minutes  PTA Visit #: 0/5       Subjective     Patient reports:her left knee feels stiff this morning.  She states she had shooting pain on her anterior left knee with a slight touch.  She was compliant with home exercise program.  Response to previous treatment: good  Functional change: none    Pain: 4/10  Location: left knee cap and anterior knee joint      Objective      Objective Measures updated at progress report unless specified.     Treatment     Trang received the treatments listed below:      therapeutic exercises to develop strength and ROM for 55 minutes including:  Prone knee flexion with strap 10 x 30"  Quad set with increased focus on distal quad activation 5"H x 10  SLR 2.5# wt x 20 with focus on minimizing extension lag  SAQ's 4# wt x 20  Hip abd 2" wt x 20  Lunges x 20 not performed  Mini-squats x 20  Side-lying hip abduction x 10 with verbal and tactile cuing for leg alignment- consider performing against the wall next session  Static standing from 6" step- left leg is stance leg 30"H x 2, focus on level pelvis " for left glute med activation    manual therapy techniques: Joint mobilizations, Myofacial release, and Soft tissue Mobilization were applied to the: left knee for 0 minutes, including:      neuromuscular re-education activities to improve: Balance, Coordination, and Posture for 0 minutes. The following activities were included:    therapeutic activities to improve functional performance for 0  minutes, including:      direct contact modalities after being cleared for contraindications:     supervised modalities after being cleared for contradictions:       Patient Education and Home Exercises       Education provided:   - quad sets    Written Home Exercises Provided: Yes. Exercises were reviewed and Trang was able to demonstrate them prior to the end of the session.  Trang demonstrated good  understanding of the education provided. See Electronic Medical Record under Patient Instructions for exercises provided during therapy sessions    Assessment     Catalina is a 48 y.o. female referred to outpatient physical therapy with a medical diagnosis of S82.042D (ICD-10-CM) - Closed displaced comminuted fracture of left patella with routine healing, subsequent encounter, and presents to PT with continued stiffness in left knee and decreased left patella mobility affecting range of motion. Patient with poor distal quad activation and may benefit from NMES to improve this. Patient with no post op precautions at this stage of recovery and would benefit from higher level and task specific strengthening to maximize function. Patient demonstrates limitations as described in the problem list. Pt will benefit from physcial therapy services in order to maximize pain free and/or functional use of left lower extremity. The following goals were discussed with the patient and patient is in agreement with them as to be addressed in the treatment plan.     Trang Is progressing well towards her goals.   Patient prognosis is Good.      Patient will continue to benefit from skilled outpatient physical therapy to address the deficits listed in the problem list box on initial evaluation, provide pt/family education and to maximize pt's level of independence in the home and community environment.     Patient's spiritual, cultural and educational needs considered and pt agreeable to plan of care and goals.     Anticipated barriers to physical therapy: 0    Goals:    Short Term Goals: 3 weeks  Demonstrate improvement in recent symptoms to progress toward long term goals  Correct postural deviations in sitting and standing to decrease pain and promote postural awareness for injury prevention.  Restore AROM left knee to 0-130 deg or better  Demonstrate compliance with initial exercise program     Long Term Goals: 6 weeks  Perform usual household tasks with no limitation  Perform usual work duties with minimal limitation  Ambulate required community distances with no limitation  Ascend/descend flight of stairs with no limitation, reciprocal pattern, handrail for safety  Transfer from floor to stand with no limitation, no use of upper extremities to assist  Perform 1/2 kneeling on Airex pad for cushion to left knee and rise to stand without upper extremity assist  Lift 20 lbs with both arms and carry 25 feet with no limitation  Demonstrate independence with home exercise program to maintain gains made in therapy.    Plan     Certification Period: 11/14/2024 to 1/31/2025.     Outpatient Physical Therapy 2 times weekly for 12  visits  to include the following interventions: patient education, Electrical Stimulation NMES to distal quad, Gait Training, Manual Therapy, Moist Heat/ Ice, Neuromuscular Re-ed, Patient Education, Self Care, Therapeutic Activities, and Therapeutic Exercise.   Pt may be seen by PTA as part of the rehabilitation team.      I certify the need for these services furnished under this plan of treatment and while under my  care.    Lyly Carmona, PT

## (undated) DEVICE — COVER SURG LIGHT HANDLE

## (undated) DEVICE — GLOVE SURGEONS ULTRA TOUCH 6.5

## (undated) DEVICE — TOURNIQUET SB QC SP 34X4IN

## (undated) DEVICE — ELECTRODE REM PLYHSV RETURN 9

## (undated) DEVICE — SUT VICRYL PLUS 0 CT1 18IN

## (undated) DEVICE — SEE MEDLINE ITEM 157116

## (undated) DEVICE — NDL 18GA

## (undated) DEVICE — BRACE KNEE T SCOPE PREMIER

## (undated) DEVICE — PADDING WYTEX UNDRCST 6INX4YD

## (undated) DEVICE — BANDAGE ELAS SOFTWRAP ST 6X5YD

## (undated) DEVICE — GAUZE SPONGE XRAY 4X4

## (undated) DEVICE — TRAY DRY SKIN SCRUB PREP

## (undated) DEVICE — SEAL LENS SCOPE MYOSURE

## (undated) DEVICE — DRAPE THREE-QTR REINF 53X77IN

## (undated) DEVICE — PAD SANITARY OB STERILE

## (undated) DEVICE — DRAPE C-ARM ELAS CLIP 42X120IN

## (undated) DEVICE — SUT FIBERWIRE #5

## (undated) DEVICE — SYS CLSR DERMABOND PRINEO 22CM

## (undated) DEVICE — SUT VICRYL PLUS 3-0 SH 18IN

## (undated) DEVICE — DRAPE TOP 53X102IN

## (undated) DEVICE — Device

## (undated) DEVICE — DRAPE T EXTRM SURG 121X128X90

## (undated) DEVICE — BANDAGE ESMARK 6X12

## (undated) DEVICE — DRAPE U SPLIT SHEET 54X76IN

## (undated) DEVICE — DRESSING TRANS 4X4 TEGADERM

## (undated) DEVICE — DRAPE UNDER BUTTOCKS WITH POUCH

## (undated) DEVICE — TUBE AQUILEX OUTFLOW

## (undated) DEVICE — KIT CANNISTER AQUILEX

## (undated) DEVICE — SOL NACL IRR 1000ML BTL

## (undated) DEVICE — CATH 16FR URETHRL RED RUB

## (undated) DEVICE — SOL NACL IRR 3000ML

## (undated) DEVICE — DRAPE STERI U-SHAPED 47X51IN

## (undated) DEVICE — SOCKINETTE IMPERVIOUS 12X48IN

## (undated) DEVICE — SPONGE COTTON TRAY 4X4IN

## (undated) DEVICE — DRAPE C-ARMOR EQUIPMENT COVER

## (undated) DEVICE — KIT DEV NOVASURE ENDOMETRIAL.

## (undated) DEVICE — TUBE SUCTION YANKAUER

## (undated) DEVICE — TUBE AQUILEX INFLOW

## (undated) DEVICE — TOWEL OR DISP STRL BLUE 4/PK

## (undated) DEVICE — TRAY MINOR ORTHO OMC

## (undated) DEVICE — CANISTER SUCTION 3000CC

## (undated) DEVICE — SUT FIBERWIRE 2 38 IN TAPER

## (undated) DEVICE — SUT VICRYL+ 1 CT1 18IN

## (undated) DEVICE — SPONGE NOVAPLUS LAP 18X18IN

## (undated) DEVICE — TAPE SURG DURAPORE 2 X10YD

## (undated) DEVICE — DRESSING AQUACEL RIBBON 2X45CM

## (undated) DEVICE — SPONGE LAP 18X18 PREWASHED

## (undated) DEVICE — BNDG COFLEX FOAM LF2 ST 6X5YD

## (undated) DEVICE — SUT MONOCRYL 3-0 PS-2 UND

## (undated) DEVICE — APPLICATOR CHLORAPREP ORN 26ML

## (undated) DEVICE — PACK DRAPE PERI/GYN TIBURON

## (undated) DEVICE — SYR B-D DISP CONTROL 10CC100/C

## (undated) DEVICE — SUT VICRYL PLUS 2-0 CT1 18